# Patient Record
Sex: FEMALE | Race: WHITE | NOT HISPANIC OR LATINO | Employment: OTHER | URBAN - NONMETROPOLITAN AREA
[De-identification: names, ages, dates, MRNs, and addresses within clinical notes are randomized per-mention and may not be internally consistent; named-entity substitution may affect disease eponyms.]

---

## 2017-03-06 ENCOUNTER — OFFICE VISIT (OUTPATIENT)
Dept: CARDIOLOGY | Facility: CLINIC | Age: 77
End: 2017-03-06

## 2017-03-06 VITALS
HEIGHT: 66 IN | OXYGEN SATURATION: 96 % | HEART RATE: 82 BPM | WEIGHT: 154.8 LBS | SYSTOLIC BLOOD PRESSURE: 131 MMHG | BODY MASS INDEX: 24.88 KG/M2 | DIASTOLIC BLOOD PRESSURE: 83 MMHG

## 2017-03-06 DIAGNOSIS — R00.2 PALPITATIONS: Primary | ICD-10-CM

## 2017-03-06 DIAGNOSIS — R07.2 PRECORDIAL PAIN: ICD-10-CM

## 2017-03-06 DIAGNOSIS — I10 ESSENTIAL HYPERTENSION: ICD-10-CM

## 2017-03-06 PROCEDURE — 99213 OFFICE O/P EST LOW 20 MIN: CPT | Performed by: PHYSICIAN ASSISTANT

## 2017-03-06 RX ORDER — DULOXETIN HYDROCHLORIDE 30 MG/1
30 CAPSULE, DELAYED RELEASE ORAL DAILY
COMMUNITY
Start: 2017-02-28 | End: 2017-07-06 | Stop reason: DRUGHIGH

## 2017-03-06 NOTE — PROGRESS NOTES
"Problem list     Subjective   Nanci Pritchard is a 76 y.o. female     Chief Complaint   Patient presents with   • Follow-up     presents as a follow up   • Palpitations   • Chest Pain       HPI  The patient presents back today to review stress and echo findings.  She was scheduled for those studies because of chest pain.  Majority of the patient's problem started when she had been placed on Mobic.  Apparently after starting that medication she had numerous issues, some of which included chest pain.  She was stopped on that a lot of her difficulties subsided.  We did go ahead and schedule her for stress test and echo.  She did fine with stress test.  During echocardiogram however she started developing chest pain.  She reports that the pain was from the pressure of the ultrasound probe.  Her pain intensified and became significant over a period of a few days.  She eventually was seen in the ER and advised that her \"echo study had set off fibromyalgia\".  She was placed on Cymbalta eventually by her rheumatologist.  Symptoms improved.  She continues to have chest pain at the site of prior echo.  She continues to be fairly dyspneic.  She has significant fatigue.  She now has a very tachycardic and irregular heart rhythm which can occasionally worsen symptoms.  She has no dizziness or syncope.  She has no further complaints otherwise.    Current Outpatient Prescriptions   Medication Sig Dispense Refill   • acetaminophen (TYLENOL) 325 MG tablet Take 650 mg by mouth 3 (three) times a day. Take with tramadol     • amLODIPine (NORVASC) 5 MG tablet Take 1 tablet by mouth daily. 90 tablet 3   • aspirin 81 MG EC tablet Take 81 mg by mouth daily.     • cloNIDine (CATAPRES) 0.1 MG tablet Take 1 tablet by mouth 3 (three) times a day as needed for high blood pressure (To be taken for b/p >160/90 as needed). 90 tablet 3   • DULoxetine (CYMBALTA) 30 MG capsule Take 30 mg by mouth Daily.     • lisinopril (PRINIVIL,ZESTRIL) 20 MG " "tablet      • meloxicam (MOBIC) 15 MG tablet daily.     • metoprolol tartrate (LOPRESSOR) 50 MG tablet Take 1 tablet by mouth daily. 90 tablet 3   • omeprazole (PriLOSEC) 20 MG capsule Take 20 mg by mouth daily.     • ondansetron (ZOFRAN) 4 MG tablet as needed.     • traMADol (ULTRAM) 50 MG tablet 3 (three) times a day.       No current facility-administered medications for this visit.        Soma [carisoprodol]    Past Medical History   Diagnosis Date   • Fibromyalgia    • Histoplasmosis    • Hypertension    • Osteoarthritis        Social History     Social History   • Marital status:      Spouse name: N/A   • Number of children: N/A   • Years of education: N/A     Occupational History   • Not on file.     Social History Main Topics   • Smoking status: Never Smoker   • Smokeless tobacco: Never Used   • Alcohol use No   • Drug use: No   • Sexual activity: Not on file     Other Topics Concern   • Not on file     Social History Narrative       Family History   Problem Relation Age of Onset   • Heart disease Mother    • Heart disease Father        Review of Systems   Constitutional: Positive for fatigue.   HENT: Negative.    Eyes: Positive for visual disturbance.   Respiratory: Positive for chest tightness and shortness of breath.    Cardiovascular: Positive for chest pain (chest pressure; pain going into left shoulder and up into neck) and palpitations. Negative for leg swelling.   Gastrointestinal: Negative.    Endocrine: Negative.    Genitourinary: Negative.    Musculoskeletal: Positive for arthralgias and myalgias (fibromyalgia).   Skin: Negative.    Allergic/Immunologic: Negative.    Neurological: Negative.    Hematological: Negative.    Psychiatric/Behavioral: Negative.        Objective   Visit Vitals   • /83 (BP Location: Left arm, Patient Position: Sitting)   • Pulse 82   • Ht 66\" (167.6 cm)   • Wt 154 lb 12.8 oz (70.2 kg)   • SpO2 96%   • BMI 24.99 kg/m2     Lab Results (most recent)     None    "     Physical Exam   Constitutional: She is oriented to person, place, and time. She appears well-developed and well-nourished. No distress.   HENT:   Head: Normocephalic and atraumatic.   Eyes: Conjunctivae and EOM are normal. Pupils are equal, round, and reactive to light.   Neck: Normal range of motion. Neck supple. No JVD present. No tracheal deviation present.   Cardiovascular: Normal rate, regular rhythm, normal heart sounds and intact distal pulses.    Pulmonary/Chest: Effort normal and breath sounds normal.   Abdominal: Soft. Bowel sounds are normal. She exhibits no distension and no mass. There is no tenderness. There is no rebound and no guarding.   Musculoskeletal: Normal range of motion. She exhibits no edema, tenderness or deformity.   Neurological: She is alert and oriented to person, place, and time.   Skin: Skin is warm and dry. No rash noted. No erythema. No pallor.   Psychiatric: She has a normal mood and affect. Her behavior is normal. Judgment and thought content normal.   Nursing note and vitals reviewed.        Procedure   Procedures       Assessment/Plan      Diagnosis Plan   1. Palpitations  Cardiac Event Monitor   2. Precordial pain  Cardiac Event Monitor   3. Essential hypertension  Cardiac Event Monitor     The patient's recent stress test and echo were unremarkable.  She feels that a lot of her symptoms are related to fibromyalgia.  Still with symptoms of palpitations, which occasionally exacerbates symptoms otherwise as above, I will schedule for an event monitor to further evaluate for dysrhythmic substrates.  If her event monitor is unremarkable, given her recent low risk stress test and echocardiogram and atypical nature of symptoms, I feel nothing further is indicated unless clinical course should change.  I have asked her to go back and see her rheumatologist to further address fibromyalgia as the patient is very concerned that this is causing the majority of her issues.

## 2017-03-20 ENCOUNTER — OUTSIDE FACILITY SERVICE (OUTPATIENT)
Dept: CARDIOLOGY | Facility: CLINIC | Age: 77
End: 2017-03-20

## 2017-03-20 PROCEDURE — 93228 REMOTE 30 DAY ECG REV/REPORT: CPT | Performed by: INTERNAL MEDICINE

## 2017-03-22 ENCOUNTER — TELEPHONE (OUTPATIENT)
Dept: CARDIOLOGY | Facility: CLINIC | Age: 77
End: 2017-03-22

## 2017-03-22 NOTE — TELEPHONE ENCOUNTER
----- Message from Gutierrez Ya sent at 3/22/2017  1:58 PM EDT -----  Contact: LEXIS   SPOUSE  HAD A PACEMAKER PUT IN LAST WEEK AND WANTS TO KNOW WHAT PAIN MEDICINE SHE CAN TAKE    Patient advised to call Dr. Gutierrez's office re: pain meds.

## 2017-03-29 ENCOUNTER — OFFICE VISIT (OUTPATIENT)
Dept: CARDIOLOGY | Facility: CLINIC | Age: 77
End: 2017-03-29

## 2017-03-29 VITALS
DIASTOLIC BLOOD PRESSURE: 89 MMHG | OXYGEN SATURATION: 98 % | BODY MASS INDEX: 25.07 KG/M2 | HEIGHT: 66 IN | HEART RATE: 80 BPM | WEIGHT: 156 LBS | SYSTOLIC BLOOD PRESSURE: 152 MMHG

## 2017-03-29 DIAGNOSIS — R74.8 ELEVATED LIVER ENZYMES: ICD-10-CM

## 2017-03-29 DIAGNOSIS — R06.02 SHORTNESS OF BREATH: ICD-10-CM

## 2017-03-29 DIAGNOSIS — R07.2 PRECORDIAL PAIN: ICD-10-CM

## 2017-03-29 DIAGNOSIS — Z95.0 PACEMAKER: ICD-10-CM

## 2017-03-29 DIAGNOSIS — E78.2 MIXED HYPERLIPIDEMIA: ICD-10-CM

## 2017-03-29 DIAGNOSIS — R00.2 PALPITATIONS: Primary | ICD-10-CM

## 2017-03-29 PROBLEM — R07.9 CHEST PAIN: Status: ACTIVE | Noted: 2017-03-29

## 2017-03-29 PROBLEM — I10 ESSENTIAL HYPERTENSION: Status: ACTIVE | Noted: 2017-03-29

## 2017-03-29 PROCEDURE — 99214 OFFICE O/P EST MOD 30 MIN: CPT | Performed by: PHYSICIAN ASSISTANT

## 2017-03-29 RX ORDER — OMEPRAZOLE 40 MG/1
40 CAPSULE, DELAYED RELEASE ORAL DAILY
COMMUNITY
Start: 2017-03-11

## 2017-03-29 NOTE — PROGRESS NOTES
Problem list     Subjective   Nanci Pritchard is a 76 y.o. female     Chief Complaint   Patient presents with   • Follow-up     presents as a follow up from Hospital    • Medication Problem     patient has questions about Rapetha     Problem List:  1.  Sick sinus syndrome.  1.1.  Greater than 3 second pause noted per event monitor.  1.2.  The patient was subsequently admitted to [facility] with placement of dual-chamber pacemaker per Dr. Gutierrez.  1.3.  Apparent lead dislodgment with subsequent fixation of the leads the following day post pacemaker placement.  2.  Preserved systolic function  3.  Low risk stress test, 09/2016.  4.  Hypertension  5.  Fibromyalgia.    HPI    The patient presents back for follow-up post hospitalization.  She was hospitalized given symptomatic 3 second pauses or greater by event monitor.  She did have an evaluation by Dr. Gutierrez who proceeded on with pacemaker placement.  There were complications with lead placement with apparent dislodgment.  She was taken back for fixation the following day.  A follow-up interrogation prior to discharge indicated stable pacer parameters.  The patient did very well initially post discharge.  She has since started noticing increased palpitations, which was a significant symptom for her prior to pacemaker placement.  She also had a severe episode of chest pain just a couple of days after discharge.  She reports that her chest pain was severe.  By description, she had a more pleuritic type presentation of her chest pain.  She reports that this lasted for several minutes.  She reports that her chest pain was very sharp and located in the center and left parasternal segments.  She had severe dyspnea at that time.  She related no fever, chills, right ears, or hemoptysis at that time.  She remained very weak for a couple of days after that.  She had only mild chest pain in the couple of days following that.  She continued to have  mild palpitations.  Over the past day or so however the patient has felt markedly improved.  She has had no further palpitations or chest pain.  Her dyspnea even seems improved.  She wanted to have evaluation today to ensure ongoing stability, particularly in the setting of her lead complication post pacer placement.  Current Outpatient Prescriptions   Medication Sig Dispense Refill   • amLODIPine (NORVASC) 5 MG tablet Take 1 tablet by mouth daily. 90 tablet 3   • aspirin 81 MG EC tablet Take 81 mg by mouth daily.     • DULoxetine (CYMBALTA) 30 MG capsule Take 30 mg by mouth Daily.     • lisinopril (PRINIVIL,ZESTRIL) 20 MG tablet      • metoprolol tartrate (LOPRESSOR) 50 MG tablet Take 1 tablet by mouth daily. 90 tablet 3   • omeprazole (priLOSEC) 40 MG capsule Take 40 mg by mouth Daily.     • Evolocumab with Infusor 420 MG/3.5ML solution cartridge Inject 420 cartridges under the skin Every 30 (Thirty) Days. 1 cartridge 11     No current facility-administered medications for this visit.        Soma [carisoprodol]    Past Medical History:   Diagnosis Date   • Fibromyalgia    • Histoplasmosis    • Hypertension    • Osteoarthritis        Social History     Social History   • Marital status:      Spouse name: N/A   • Number of children: N/A   • Years of education: N/A     Occupational History   • Not on file.     Social History Main Topics   • Smoking status: Never Smoker   • Smokeless tobacco: Never Used   • Alcohol use No   • Drug use: No   • Sexual activity: Not on file     Other Topics Concern   • Not on file     Social History Narrative       Family History   Problem Relation Age of Onset   • Heart disease Mother    • Heart disease Father        Review of Systems   Constitutional: Positive for fatigue.   HENT: Negative.    Eyes: Positive for visual disturbance (glasses).   Respiratory: Positive for shortness of breath.    Cardiovascular: Positive for chest pain (had a bad spell on saturday. pain thru to back  "and goes up to shoulder blades).   Gastrointestinal: Positive for nausea.   Endocrine: Negative.    Genitourinary: Negative.    Musculoskeletal: Positive for back pain.   Skin: Negative.    Allergic/Immunologic: Negative.    Neurological: Negative.    Hematological: Negative.    Psychiatric/Behavioral: The patient is nervous/anxious.        Objective   /89 (BP Location: Left arm, Patient Position: Sitting)  Pulse 80  Ht 66\" (167.6 cm)  Wt 156 lb (70.8 kg)  SpO2 98%  BMI 25.18 kg/m2  Lab Results (most recent)     None        Physical Exam   Constitutional: She is oriented to person, place, and time. She appears well-developed and well-nourished. No distress.   HENT:   Head: Normocephalic and atraumatic.   Eyes: Conjunctivae and EOM are normal. Pupils are equal, round, and reactive to light.   Neck: Normal range of motion. Neck supple. No JVD present. No tracheal deviation present.   Cardiovascular: Normal rate, regular rhythm, normal heart sounds and intact distal pulses.    Pulmonary/Chest: Effort normal and breath sounds normal.   Abdominal: Soft. Bowel sounds are normal. She exhibits no distension and no mass. There is no tenderness. There is no rebound and no guarding.   Musculoskeletal: Normal range of motion. She exhibits no edema, tenderness or deformity.   Neurological: She is alert and oriented to person, place, and time.   Skin: Skin is warm and dry. No rash noted. No erythema. No pallor.   Psychiatric: She has a normal mood and affect. Her behavior is normal. Judgment and thought content normal.   Nursing note and vitals reviewed.        Procedure   Procedures       Assessment/Plan      Diagnosis Plan   1. Palpitations  XR Chest PA & Lateral    CBC & Differential    Comprehensive Metabolic Panel    XR Chest PA & Lateral    CBC & Differential    Comprehensive Metabolic Panel   2. Precordial pain  XR Chest PA & Lateral    CBC & Differential    Comprehensive Metabolic Panel    XR Chest PA & Lateral " "   CBC & Differential    Comprehensive Metabolic Panel   3. Shortness of breath  XR Chest PA & Lateral    CBC & Differential    Comprehensive Metabolic Panel    D-dimer, Quantitative    XR Chest PA & Lateral    CBC & Differential    Comprehensive Metabolic Panel    D-dimer, Quantitative   4. Elevated liver enzymes  Evolocumab with Infusor 420 MG/3.5ML solution cartridge   5. Mixed hyperlipidemia  XR Chest PA & Lateral    CBC & Differential    Comprehensive Metabolic Panel    Evolocumab with Infusor 420 MG/3.5ML solution cartridge    XR Chest PA & Lateral    CBC & Differential    Comprehensive Metabolic Panel   6. Pacemaker         I would like to schedule for a chest x-ray and labs, all as above, given her chest pain and shortness of air after pacemaker placement as above.  I would prefer to have an echocardiogram performed to ensure no effusion post pacer implant, particularly given her lead issues after placement, all as outlined above.  The patient is very adamant that she would not want an echocardiogram.  She reports that when having her last echocardiogram, the pressure from the echo probe \"set off her fibromyalgia throughout her chest wall\".  This was so severe that she actually had to have morphine to offset the pain.  She just basically refuses to have the echo performed.  Also, I would like to schedule for an interrogation of her pacemaker.  This will serve not only to ensure lead stability, but will allow to evaluate for potential dysrhythmias as the patient had fairly significant palpitations as of recent.    The patient also is interested in pursuing injectable cholesterol medications.  She has failed virtually all statins.  She has severe dyslipidemia, which has been a chronic recurrent issue for her.  She had labs recently which reflects the same.  I will attempt to restart this through her insurance company.  She reports that she was told several times throughout her recent hospitalization that she " would need consideration for the same.  Regardless, further pending all the above.

## 2017-04-13 ENCOUNTER — OFFICE VISIT (OUTPATIENT)
Dept: CARDIOLOGY | Facility: CLINIC | Age: 77
End: 2017-04-13

## 2017-04-13 VITALS
HEIGHT: 66 IN | HEART RATE: 71 BPM | DIASTOLIC BLOOD PRESSURE: 78 MMHG | SYSTOLIC BLOOD PRESSURE: 132 MMHG | OXYGEN SATURATION: 95 % | BODY MASS INDEX: 24.91 KG/M2 | WEIGHT: 155 LBS

## 2017-04-13 DIAGNOSIS — I10 ESSENTIAL HYPERTENSION: ICD-10-CM

## 2017-04-13 DIAGNOSIS — R07.9 CHEST PAIN, UNSPECIFIED TYPE: Primary | ICD-10-CM

## 2017-04-13 DIAGNOSIS — R00.2 PALPITATIONS: ICD-10-CM

## 2017-04-13 DIAGNOSIS — R00.2 HEART PALPITATIONS: ICD-10-CM

## 2017-04-13 PROCEDURE — 93000 ELECTROCARDIOGRAM COMPLETE: CPT | Performed by: PHYSICIAN ASSISTANT

## 2017-04-13 PROCEDURE — 99213 OFFICE O/P EST LOW 20 MIN: CPT | Performed by: PHYSICIAN ASSISTANT

## 2017-04-13 NOTE — PROGRESS NOTES
Problem list     Subjective   Nanci Pritchard is a 77 y.o. female     Chief Complaint   Patient presents with   • Follow-up     patient appears in office today with c/o chest pressure (x3 this week)   Problem List:  1. Sick sinus syndrome.  1.1. Greater than 3 second pause noted per event monitor.  1.2. The patient was subsequently admitted to [facility] with placement of dual-chamber pacemaker per Dr. Gutierrez.  1.3. Apparent lead dislodgment with subsequent fixation of the leads the following day post pacemaker placement.  2. Preserved systolic function  3. Low risk stress test, 09/2016.  4. Hypertension  5. Fibromyalgia.       HPI  The patient presents back today at her request.  She continues to have severe chest pain.  She reports that she has woke up the past 2 nights just after going to bed.  She had intense precordial chest discomfort which had referral through to the mid back.  There is also referral to the left upper extremity.  The pain was so severe that she reports that she would actually cry out.  Her chest pain lasted varied amounts of time but mostly no more than 5-10 minutes.  She has had this basically since pacer placement.  We did a chest x-ray which was unremarkable.  We had wanted to repeat an echocardiogram to ensure no effusion, however the patient reports that the last echocardiogram induced fibromyalgia throughout her chest wall which was so severe that she needed morphine from the ER.  She did not want to repeat that.  She did have a stress test just prior to pacer placement which indicated no evidence of ischemia.  She was out further evaluation given her chest pain.  She also does note that she is tachycardic when she walks around now.  She reports that her resting heart rate will be between 60-70 beats per minutes, but increased to 112-115 bpm with minor activity.  She had the same complaint previously which prompted us to do a pacer interrogation.  She had no  dysrhythmic symptoms by device check.    Current Outpatient Prescriptions   Medication Sig Dispense Refill   • amLODIPine (NORVASC) 5 MG tablet Take 1 tablet by mouth daily. 90 tablet 3   • aspirin 81 MG EC tablet Take 81 mg by mouth daily.     • DULoxetine (CYMBALTA) 30 MG capsule Take 30 mg by mouth Daily.     • lisinopril (PRINIVIL,ZESTRIL) 20 MG tablet      • metoprolol tartrate (LOPRESSOR) 50 MG tablet Take 1 tablet by mouth daily. 90 tablet 3   • omeprazole (priLOSEC) 40 MG capsule Take 40 mg by mouth Daily.       No current facility-administered medications for this visit.        Soma [carisoprodol]    Past Medical History:   Diagnosis Date   • Arrhythmia    • Fibromyalgia    • Histoplasmosis    • Hyperlipidemia    • Hypertension    • Osteoarthritis        Social History     Social History   • Marital status:      Spouse name: N/A   • Number of children: N/A   • Years of education: N/A     Occupational History   • Not on file.     Social History Main Topics   • Smoking status: Never Smoker   • Smokeless tobacco: Never Used   • Alcohol use No   • Drug use: No   • Sexual activity: Defer     Other Topics Concern   • Not on file     Social History Narrative       Family History   Problem Relation Age of Onset   • Heart disease Mother    • Heart disease Father        Review of Systems   Constitutional: Positive for fatigue.   HENT: Positive for hearing loss.    Eyes: Negative for visual disturbance.   Respiratory: Positive for chest tightness (when having chest pain ).    Cardiovascular: Positive for chest pain (3x this week; waking up in middle of night ), palpitations (occasional ) and leg swelling.   Gastrointestinal: Negative.    Endocrine: Negative.    Genitourinary: Negative.    Musculoskeletal: Positive for arthralgias.   Skin: Negative.    Allergic/Immunologic: Negative.  Negative for environmental allergies and food allergies.   Neurological: Negative.    Hematological: Bruises/bleeds easily  "(bleeds easily ).   Psychiatric/Behavioral: The patient is nervous/anxious (occasional ).        Objective   /78 (BP Location: Left arm, Patient Position: Sitting)  Pulse 71  Ht 66\" (167.6 cm)  Wt 155 lb (70.3 kg)  SpO2 95%  BMI 25.02 kg/m2  Lab Results (most recent)     None        Physical Exam   Constitutional: She is oriented to person, place, and time. She appears well-developed and well-nourished. No distress.   HENT:   Head: Normocephalic and atraumatic.   Eyes: Conjunctivae and EOM are normal. Pupils are equal, round, and reactive to light.   Neck: Normal range of motion. Neck supple. No JVD present. No tracheal deviation present.   Cardiovascular: Normal rate, regular rhythm, normal heart sounds and intact distal pulses.    Pulmonary/Chest: Effort normal and breath sounds normal.   Abdominal: Soft. Bowel sounds are normal. She exhibits no distension and no mass. There is no tenderness. There is no rebound and no guarding.   Musculoskeletal: Normal range of motion. She exhibits no edema, tenderness or deformity.   Neurological: She is alert and oriented to person, place, and time.   Skin: Skin is warm and dry. No rash noted. No erythema. No pallor.   Psychiatric: She has a normal mood and affect. Her behavior is normal. Judgment and thought content normal.   Nursing note and vitals reviewed.        Procedure     ECG 12 Lead  Date/Time: 4/13/2017 2:06 PM  Performed by: SHELLI HENDRICKSON  Authorized by: SHELLI HENDRICKSON   Comments: CP  SOB  PALPITATIONS      Sinus rhythm without acute changes noted by EKG today.               Assessment/Plan      Diagnosis Plan   1. Chest pain, unspecified type  ECG 12 Lead    CT Chest With Contrast   2. Essential hypertension  ECG 12 Lead    CT Chest With Contrast   3. Heart palpitations  ECG 12 Lead    CT Chest With Contrast     I will schedule for CT the chest.  The patient continues to have severe chest pain.  Her prior ischemia assessment was unremarkable.  " Chest x-ray after last appointment was unremarkable as well.  I have attempted to schedule for an echocardiogram to ensure no effusion post pacer implant (doubtful), but she cannot do the echo cavagram all as outlined above.  She has rather significant dyspnea as well.  She has ongoing tachycardia but had that prior to last pacer interrogation.  She had no dysrhythmic symptoms.  I do not feel further evaluation is warranted for that.  If CT of the chest is unremarkable, I feel nothing further is indicated at this time.  We'll see her back pending.  For further issues, she will call to us.  Also note, we are attempting to get injectable cholesterol medications covered for her as she has failed virtually all statins therapies.

## 2017-07-06 ENCOUNTER — OFFICE VISIT (OUTPATIENT)
Dept: CARDIOLOGY | Facility: CLINIC | Age: 77
End: 2017-07-06

## 2017-07-06 VITALS
BODY MASS INDEX: 25.75 KG/M2 | OXYGEN SATURATION: 96 % | WEIGHT: 160.2 LBS | DIASTOLIC BLOOD PRESSURE: 74 MMHG | HEART RATE: 78 BPM | SYSTOLIC BLOOD PRESSURE: 136 MMHG | HEIGHT: 66 IN

## 2017-07-06 DIAGNOSIS — Z95.0 PACEMAKER: ICD-10-CM

## 2017-07-06 DIAGNOSIS — R06.02 SHORTNESS OF BREATH: Primary | ICD-10-CM

## 2017-07-06 DIAGNOSIS — R53.83 OTHER FATIGUE: ICD-10-CM

## 2017-07-06 PROCEDURE — 99213 OFFICE O/P EST LOW 20 MIN: CPT | Performed by: PHYSICIAN ASSISTANT

## 2017-07-06 RX ORDER — DULOXETIN HYDROCHLORIDE 60 MG/1
60 CAPSULE, DELAYED RELEASE ORAL DAILY
COMMUNITY
Start: 2017-06-23

## 2017-07-06 NOTE — PROGRESS NOTES
"Problem list     Subjective   Nanci Pritchard is a 77 y.o. female     Chief Complaint   Patient presents with   • Follow-up     CT   Problem List:  1. Sick sinus syndrome.  1.1. Greater than 3 second pause noted per event monitor.  1.2. The patient was subsequently admitted to [facility] with placement of dual-chamber pacemaker per Dr. Gutierrez.  1.3. Apparent lead dislodgment with subsequent fixation of the leads the following day post pacemaker placement.  2. Preserved systolic function  3. Low risk stress test, 09/2016.  4. Hypertension  5. Fibromyalgia.       HPI  The patient presents back today for follow-up.  We'd seen her because of ongoing chest pain and shortness of air last visit.  We did schedule for CT of the chest.  That indicated largely stable findings but did note a nodule was felt chronic.  We have sent that to his primary care provider following of the same.  The patient reports that her chest pain is persistent.  Her dyspnea is moderate.  She has fatigue which is significant at times.  She feels that all the above is related to \"fibro\".  Her  believes that as well.  Cardiac testing at this point is been largely unremarkable otherwise.  Her CT did suggest calcification of coronary arteries.  Her stress test from September however indicated no evidence of ischemia.  The patient feels she is doing well enough at this time.  She has improved post pacemaker implant.  The patient has no further complaints otherwise.    Current Outpatient Prescriptions   Medication Sig Dispense Refill   • amLODIPine (NORVASC) 5 MG tablet Take 1 tablet by mouth daily. 90 tablet 3   • aspirin 81 MG EC tablet Take 81 mg by mouth daily.     • DULoxetine (CYMBALTA) 60 MG capsule Daily.     • Evolocumab with Infusor (REPATHA PUSHTRONEX SYSTEM) 420 MG/3.5ML solution cartridge Inject  under the skin Every 30 (Thirty) Days.     • lisinopril (PRINIVIL,ZESTRIL) 20 MG tablet Take 20 mg by mouth Daily.   " "  • metFORMIN (GLUCOPHAGE) 500 MG tablet 1,500 mg Daily.     • metoprolol tartrate (LOPRESSOR) 50 MG tablet Take 1 tablet by mouth daily. 90 tablet 3   • omeprazole (priLOSEC) 40 MG capsule Take 40 mg by mouth Daily.       No current facility-administered medications for this visit.        Soma [carisoprodol]    Past Medical History:   Diagnosis Date   • Arrhythmia    • Fibromyalgia    • Histoplasmosis    • Hyperlipidemia    • Hypertension    • Osteoarthritis        Social History     Social History   • Marital status:      Spouse name: N/A   • Number of children: N/A   • Years of education: N/A     Occupational History   • Not on file.     Social History Main Topics   • Smoking status: Never Smoker   • Smokeless tobacco: Never Used   • Alcohol use No   • Drug use: No   • Sexual activity: Defer     Other Topics Concern   • Not on file     Social History Narrative       Family History   Problem Relation Age of Onset   • Heart disease Mother    • Heart disease Father        Review of Systems   Constitutional: Positive for fatigue.   HENT: Positive for nosebleeds, sore throat and trouble swallowing.    Eyes: Positive for visual disturbance (wears glasses).   Respiratory: Negative.    Cardiovascular: Positive for palpitations. Negative for chest pain and leg swelling.   Gastrointestinal: Negative.    Endocrine: Negative.    Genitourinary: Negative.    Musculoskeletal: Positive for arthralgias and myalgias.   Skin: Negative.    Allergic/Immunologic: Negative.    Neurological: Negative.    Hematological: Negative.    Psychiatric/Behavioral: Negative.        Objective   /74 (BP Location: Left arm, Patient Position: Sitting)  Pulse 78  Ht 66\" (167.6 cm)  Wt 160 lb 3.2 oz (72.7 kg)  SpO2 96%  BMI 25.86 kg/m2  Lab Results (most recent)     None        Physical Exam   Constitutional: She is oriented to person, place, and time. She appears well-developed and well-nourished. No distress.   HENT:   Head: " Normocephalic and atraumatic.   Eyes: Conjunctivae and EOM are normal. Pupils are equal, round, and reactive to light.   Neck: Normal range of motion. Neck supple. No JVD present. No tracheal deviation present.   Cardiovascular: Normal rate, regular rhythm, normal heart sounds and intact distal pulses.    Pulmonary/Chest: Effort normal and breath sounds normal.   Abdominal: Soft. Bowel sounds are normal. She exhibits no distension and no mass. There is no tenderness. There is no rebound and no guarding.   Musculoskeletal: Normal range of motion. She exhibits no edema, tenderness or deformity.   Neurological: She is alert and oriented to person, place, and time.   Skin: Skin is warm and dry. No rash noted. No erythema. No pallor.   Psychiatric: She has a normal mood and affect. Her behavior is normal. Judgment and thought content normal.   Nursing note and vitals reviewed.        Procedure   Procedures       Assessment/Plan      Diagnosis Plan   1. Shortness of breath     2. Other fatigue     3. Pacemaker       The patient seems stable.  Recent workup is been unremarkable.  She had calcification of the coronaries by CT, however has no evidence of ischemia by stress test findings.  Symptoms otherwise seems stable for her.  I feel nothing further is indicated.  We'll see her back in 6 month, sooner if indicated by course.  We will continue routine pacemaker interrogations every 6 months as well.  For issues, she will call to us immediately.

## 2017-09-14 ENCOUNTER — TELEPHONE (OUTPATIENT)
Dept: CARDIOLOGY | Facility: CLINIC | Age: 77
End: 2017-09-14

## 2017-09-14 NOTE — TELEPHONE ENCOUNTER
Cardiac clearance req was received in office from [facility] .     Records were reviewed by Edilsno Souza PA-C and cardiac clearance was faxed to [facility] @ 3485 PM. -KAT; MACARIO

## 2017-11-13 ENCOUNTER — OFFICE VISIT (OUTPATIENT)
Dept: CARDIOLOGY | Facility: CLINIC | Age: 77
End: 2017-11-13

## 2017-11-13 ENCOUNTER — TELEPHONE (OUTPATIENT)
Dept: CARDIOLOGY | Facility: CLINIC | Age: 77
End: 2017-11-13

## 2017-11-13 DIAGNOSIS — I49.5 SICK SINUS SYNDROME (HCC): Primary | ICD-10-CM

## 2017-11-13 PROCEDURE — 93288 INTERROG EVL PM/LDLS PM IP: CPT | Performed by: INTERNAL MEDICINE

## 2017-11-13 NOTE — TELEPHONE ENCOUNTER
PATIENT WAS SCHEDULED TO HAVE HER DEVICE CHECKED ON Friday, BUT REQUESTED IT BE DONE SOONER. STATED DR. REYES'S OFFICE REQUESTING DEVICE TO BE CHECKED PRIOR TO KNEE REPLACEMMENT SURGERY ON Monday. DEVICE CHECKED TODAY PER [facility], RESULTS REVIEWED BY SHELLI HENDRICKOSN, NILESH,AND RESULTS FAXED TO ATTNCrow LERMA AT [facility] PRE-ADMISSION TESTING FAX # [phone]. RADHA,BUZZN

## 2017-11-14 ENCOUNTER — TELEPHONE (OUTPATIENT)
Dept: CARDIOLOGY | Facility: CLINIC | Age: 77
End: 2017-11-14

## 2017-11-14 NOTE — TELEPHONE ENCOUNTER
patient presents today to have [employer] forms filled out to help with co-pay assistance with her Repatha injection, form was filled out, faxed to the [facility] and given back to patient with a copy put into scan pile to be scanned into patients chart. patient is to call our office with questions or concerns.

## 2017-11-16 ENCOUNTER — TELEPHONE (OUTPATIENT)
Dept: CARDIOLOGY | Facility: CLINIC | Age: 77
End: 2017-11-16

## 2017-11-16 NOTE — TELEPHONE ENCOUNTER
Cardiac clearance req was received in office from Dr. Pedro TEJEDA. This was reviewed by Edilson Souza PA-C and sent back on 11/13/2017. -;MACARIO

## 2018-02-28 ENCOUNTER — DOCUMENTATION (OUTPATIENT)
Dept: CARDIOLOGY | Facility: CLINIC | Age: 78
End: 2018-02-28

## 2018-02-28 NOTE — PROGRESS NOTES
Cardiac clearance req was received from  office, this was reviewed by Edilson Souza PA-C and faxed back . -;[facility]

## 2018-04-19 ENCOUNTER — OFFICE VISIT (OUTPATIENT)
Dept: CARDIOLOGY | Facility: CLINIC | Age: 78
End: 2018-04-19

## 2018-04-19 VITALS
SYSTOLIC BLOOD PRESSURE: 140 MMHG | OXYGEN SATURATION: 96 % | HEART RATE: 78 BPM | DIASTOLIC BLOOD PRESSURE: 79 MMHG | HEIGHT: 66 IN | BODY MASS INDEX: 24.59 KG/M2 | WEIGHT: 153 LBS

## 2018-04-19 DIAGNOSIS — R06.02 SOB (SHORTNESS OF BREATH): Primary | ICD-10-CM

## 2018-04-19 DIAGNOSIS — Z95.0 PACEMAKER: ICD-10-CM

## 2018-04-19 DIAGNOSIS — I10 ESSENTIAL HYPERTENSION: ICD-10-CM

## 2018-04-19 PROCEDURE — 93000 ELECTROCARDIOGRAM COMPLETE: CPT | Performed by: PHYSICIAN ASSISTANT

## 2018-04-19 PROCEDURE — 99213 OFFICE O/P EST LOW 20 MIN: CPT | Performed by: PHYSICIAN ASSISTANT

## 2018-04-19 RX ORDER — GLIMEPIRIDE 4 MG/1
4 TABLET ORAL DAILY
COMMUNITY
Start: 2018-03-05 | End: 2022-02-08

## 2018-04-19 NOTE — PATIENT INSTRUCTIONS

## 2018-04-19 NOTE — PROGRESS NOTES
Problem list     Subjective   Nanci Pritchard is a 78 y.o. female     Chief Complaint   Patient presents with   • Follow-up     PATIENT APPEARS IN OFFICE TODAY FOR FOLLOW UP    • Shortness of Breath   Problem List:  1. Sick sinus syndrome.  1.1. Greater than 3 second pause noted per event monitor.  1.2. The patient was subsequently admitted to [facility] with placement of dual-chamber pacemaker per Dr. Gutierrez.  1.3. Apparent lead dislodgment with subsequent fixation of the leads the following day post pacemaker placement.  2. Preserved systolic function  3. Low risk stress test, 09/2016.  4. Hypertension  5. Fibromyalgia.       HPI  The patient presents back in for routine evaluation follow-up.  Interrogations to the clinic if indicated stable pacemaker function.  The patient tells me that she has had mild chest discomfort since her last evaluation, but she feels that this is related more to fibromyalgia.  She still has palpitations at times.  She reports brief tachycardic episodes.  She has no sustained dysrhythmic activity.  She has stable dyspnea.  She denies dizziness or syncope.  Blood pressures are largely controlled when checked at home.  She is tolerating current medications without complications.  Labs are followed through her primary care provider and felt largely normal by patient.    Current Outpatient Prescriptions   Medication Sig Dispense Refill   • amLODIPine (NORVASC) 5 MG tablet Take 1 tablet by mouth daily. 90 tablet 3   • aspirin 81 MG EC tablet Take 81 mg by mouth daily.     • DULoxetine (CYMBALTA) 60 MG capsule Take 60 mg by mouth Daily.     • Evolocumab with Infusor (REPATHA PUSHTRONEX SYSTEM) 420 MG/3.5ML solution cartridge Inject  under the skin Every 30 (Thirty) Days.     • glimepiride (AMARYL) 2 MG tablet Take 2 mg by mouth Daily.     • lisinopril (PRINIVIL,ZESTRIL) 20 MG tablet Take 20 mg by mouth Daily.     • metFORMIN (GLUCOPHAGE) 500 MG tablet Take 1,000 mg by  mouth 2 (Two) Times a Day With Meals.     • metoprolol tartrate (LOPRESSOR) 50 MG tablet Take 1 tablet by mouth daily. 90 tablet 3   • omeprazole (priLOSEC) 40 MG capsule Take 40 mg by mouth Daily.       No current facility-administered medications for this visit.        Soma [carisoprodol]    Past Medical History:   Diagnosis Date   • Arrhythmia    • Fibromyalgia    • Histoplasmosis    • Hyperlipidemia    • Hypertension    • Osteoarthritis        Social History     Social History   • Marital status:      Spouse name: N/A   • Number of children: N/A   • Years of education: N/A     Occupational History   • Not on file.     Social History Main Topics   • Smoking status: Never Smoker   • Smokeless tobacco: Never Used   • Alcohol use No   • Drug use: No   • Sexual activity: Defer     Other Topics Concern   • Not on file     Social History Narrative   • No narrative on file       Family History   Problem Relation Age of Onset   • Heart disease Mother    • Heart disease Father        Review of Systems   Constitutional: Negative.  Negative for fatigue.   HENT: Negative.  Negative for congestion, rhinorrhea, sneezing and sore throat.    Eyes: Positive for visual disturbance (WEARS GLASSES DAILY).   Respiratory: Positive for shortness of breath (WITH EXERTION ). Negative for apnea, cough, chest tightness and wheezing.    Cardiovascular: Positive for palpitations (OCCASIONAL PALPITATIONS ). Negative for chest pain (DENIES CP) and leg swelling.   Gastrointestinal: Negative.  Negative for abdominal distention, abdominal pain, nausea and vomiting.   Endocrine: Negative.  Negative for cold intolerance, heat intolerance, polyphagia and polyuria.   Genitourinary: Negative.  Negative for difficulty urinating, frequency and urgency.   Musculoskeletal: Positive for arthralgias (JOINTS). Negative for back pain, myalgias, neck pain and neck stiffness.   Skin: Negative.  Negative for rash and wound.   Allergic/Immunologic:  "Negative.  Negative for environmental allergies and food allergies.   Neurological: Negative.  Negative for dizziness, weakness, light-headedness and headaches.   Hematological: Negative.  Does not bruise/bleed easily.   Psychiatric/Behavioral: Negative for agitation, confusion and sleep disturbance (DENIES WAKING UP SMOTHERING/SOA). The patient is not nervous/anxious.        Objective   Vitals:    04/19/18 1309   BP: 140/79   BP Location: Left arm   Patient Position: Sitting   Pulse: 78   SpO2: 96%   Weight: 69.4 kg (153 lb)   Height: 167.6 cm (66\")      /79 (BP Location: Left arm, Patient Position: Sitting)   Pulse 78   Ht 167.6 cm (66\")   Wt 69.4 kg (153 lb)   SpO2 96%   BMI 24.69 kg/m²    Lab Results (most recent)     None        Physical Exam   Constitutional: She is oriented to person, place, and time. She appears well-developed and well-nourished. No distress.   HENT:   Head: Normocephalic and atraumatic.   Eyes: Conjunctivae and EOM are normal. Pupils are equal, round, and reactive to light.   Neck: Normal range of motion. Neck supple. No JVD present. No tracheal deviation present.   Cardiovascular: Normal rate, regular rhythm, normal heart sounds and intact distal pulses.    Pulmonary/Chest: Effort normal and breath sounds normal.   Abdominal: Soft. Bowel sounds are normal. She exhibits no distension and no mass. There is no tenderness. There is no rebound and no guarding.   Musculoskeletal: Normal range of motion. She exhibits no edema, tenderness or deformity.   Neurological: She is alert and oriented to person, place, and time.   Skin: Skin is warm and dry. No rash noted. No erythema. No pallor.   Psychiatric: She has a normal mood and affect. Her behavior is normal. Judgment and thought content normal.   Nursing note and vitals reviewed.        Procedure     ECG 12 Lead  Date/Time: 4/19/2018 1:12 PM  Performed by: SHELLI HENDRICKSON  Authorized by: SHELLI HENDRICKSON   Comments: SOB    Sinus " rhythm at 71, minor nonspecific ST and T-wave changes, no acute changes noted.               Assessment/Plan      Diagnosis Plan   1. SOB (shortness of breath)  ECG 12 Lead   2. Pacemaker     3. Essential hypertension         The patient is stable at this time.  We will continue routine pacer interrogations through the clinic.  I will make no changes in medications.  For recurrent symptoms as above, the patient will call.  She was no cardiac workup at this time as she feels she is doing too well.         Patient's Body mass index is 24.69 kg/m². BMI is above normal parameters. Follow-up plan includes:  educational material and referral to primary care.             Electronically signed by:

## 2018-05-21 ENCOUNTER — TELEPHONE (OUTPATIENT)
Dept: CARDIOLOGY | Facility: CLINIC | Age: 78
End: 2018-05-21

## 2018-05-21 NOTE — TELEPHONE ENCOUNTER
CALLED  ON INR RESULTS AND WIFE WENT ON TO SAY SHE HAD A LOT OF COMPANY OVER ON Saturday AND DID A LOT OF COOKING AND LATER THAT EVENING SHE BECAME CLAMMY, NAUSEATED, DIZZY, WEAK AND H/R WA FLUCTUATING BETWEEN 60'-70'S AND ONCE DOWN TO 59. STATED IT REALLY SCARED HER AND SHE DOESN'T EVER WANT TO FELL THAT AGAIN. SHELLI HENDRICKSON, PAC AWARE AND PROBABLY DUE TO BEING OVERLY TIRED AND DOING TO MUCH. PATIENT THOUGHT THIS MAY HAVE BEEN THE CASE ALSO. TO MONITOR AND CALL OFFICE BACK WITH ANY PROBLEMS. PH,LPN

## 2018-05-29 ENCOUNTER — TELEPHONE (OUTPATIENT)
Dept: CARDIOLOGY | Facility: CLINIC | Age: 78
End: 2018-05-29

## 2018-05-29 NOTE — TELEPHONE ENCOUNTER
"CALLED PATIENT WITH INR INSTRUCTIONS AND WIFE REQUESTED TO TALK WITH ME. STATES SHE IS TILL HAVING EPISODES OF NAUSEA AND WHAT SOUNDS LIKE PALPS. STATES \"I JUST DOEN'T KNOW WHAT ITS DOING IN THERE\".; APPT. SCHEDULED FOR JODI. PATIENT AWARE. RADHA,LPN  "

## 2018-05-30 ENCOUNTER — OFFICE VISIT (OUTPATIENT)
Dept: CARDIOLOGY | Facility: CLINIC | Age: 78
End: 2018-05-30

## 2018-05-30 VITALS
BODY MASS INDEX: 24.88 KG/M2 | HEIGHT: 66 IN | SYSTOLIC BLOOD PRESSURE: 138 MMHG | DIASTOLIC BLOOD PRESSURE: 77 MMHG | WEIGHT: 154.8 LBS | HEART RATE: 79 BPM | OXYGEN SATURATION: 96 %

## 2018-05-30 DIAGNOSIS — Z95.0 PACEMAKER: ICD-10-CM

## 2018-05-30 DIAGNOSIS — R07.2 PRECORDIAL PAIN: ICD-10-CM

## 2018-05-30 DIAGNOSIS — R00.2 PALPITATIONS: Primary | ICD-10-CM

## 2018-05-30 DIAGNOSIS — R06.02 SHORTNESS OF BREATH: ICD-10-CM

## 2018-05-30 PROCEDURE — 93000 ELECTROCARDIOGRAM COMPLETE: CPT | Performed by: PHYSICIAN ASSISTANT

## 2018-05-30 PROCEDURE — 99213 OFFICE O/P EST LOW 20 MIN: CPT | Performed by: PHYSICIAN ASSISTANT

## 2018-05-30 NOTE — PATIENT INSTRUCTIONS

## 2018-05-30 NOTE — PROGRESS NOTES
Problem list     Subjective   Nanci Pritchard is a 78 y.o. female     Chief Complaint   Patient presents with   • Palpitations     PATIENT APPEARS IN OFFICE TODAY STATING SHE HAS NOTICED INCREASED PALPITATIONS/FLUTTERS AT RANDOM OVER PAST 3 WEEKS   Problem List:  1. Sick sinus syndrome.  1.1. Greater than 3 second pause noted per event monitor.  1.2. The patient was subsequently admitted to [facility] with placement of dual-chamber pacemaker per Dr. Gutierrez.  1.3. Apparent lead dislodgment with subsequent fixation of the leads the following day post pacemaker placement.  2. Preserved systolic function  3. Low risk stress test, 09/2016.  4. Hypertension  5. Fibromyalgia.    HPI  The patient presents in today at her request.  She presents because of palpitations.  She describes irregularities and a tachycardic sensation.  This can last several minutes at a time.  Once experiencing palpitations, the patient will then noted significant shortness of air and chest discomfort.  She really has no shortness of air chest discomfort without the palpitations.  She describes episodes of dizziness with that as well.  She relates to no syncope.  She reports that her exercise capacity is adequate, as long was not expressing palpitations.  She denies PND or orthopnea.  Last interrogation was approximately November which suggested stable function but a short episode of SVT.  She has no further complaints otherwise.    Current Outpatient Prescriptions   Medication Sig Dispense Refill   • amLODIPine (NORVASC) 5 MG tablet Take 1 tablet by mouth daily. 90 tablet 3   • aspirin 81 MG EC tablet Take 81 mg by mouth daily.     • DULoxetine (CYMBALTA) 60 MG capsule Take 60 mg by mouth Daily.     • Evolocumab with Infusor (REPATHA PUSHTRONEX SYSTEM) 420 MG/3.5ML solution cartridge Inject  under the skin Every 30 (Thirty) Days.     • glimepiride (AMARYL) 2 MG tablet Take 4 mg by mouth Daily.     • lisinopril  (PRINIVIL,ZESTRIL) 20 MG tablet Take 20 mg by mouth Daily.     • metFORMIN (GLUCOPHAGE) 500 MG tablet Take 1,000 mg by mouth 2 (Two) Times a Day With Meals.     • metoprolol tartrate (LOPRESSOR) 50 MG tablet Take 1 tablet by mouth daily. 90 tablet 3   • omeprazole (priLOSEC) 40 MG capsule Take 40 mg by mouth Daily.       No current facility-administered medications for this visit.        Soma [carisoprodol]    Past Medical History:   Diagnosis Date   • Arrhythmia    • Fibromyalgia    • Histoplasmosis    • Hyperlipidemia    • Hypertension    • Osteoarthritis        Social History     Social History   • Marital status:      Spouse name: N/A   • Number of children: N/A   • Years of education: N/A     Occupational History   • Not on file.     Social History Main Topics   • Smoking status: Never Smoker   • Smokeless tobacco: Never Used   • Alcohol use No   • Drug use: No   • Sexual activity: Defer     Other Topics Concern   • Not on file     Social History Narrative   • No narrative on file       Family History   Problem Relation Age of Onset   • Heart disease Mother    • Heart disease Father        Review of Systems   Constitutional: Negative.  Negative for fatigue.   HENT: Negative.  Negative for congestion, rhinorrhea, sneezing and sore throat.    Eyes: Positive for visual disturbance (WEARS GLASSES DAILY).   Respiratory: Positive for chest tightness (CHEST PRESSURE/TIGHTNESS WITH EPISODES) and shortness of breath (EASILY SOA ; WORSE ON EXERTION ). Negative for apnea, cough and wheezing.    Cardiovascular: Positive for palpitations (INCREASE IN PALPITATIONS/FLUTTERS OVER LAST 3 WEEKS). Negative for chest pain (DENIES CP) and leg swelling.   Gastrointestinal: Positive for nausea (FREQUENT NAUSEA). Negative for abdominal distention, abdominal pain and vomiting.   Endocrine: Negative.  Negative for cold intolerance, heat intolerance, polyphagia and polyuria.   Genitourinary: Negative.  Negative for difficulty  "urinating and frequency.   Musculoskeletal: Positive for arthralgias (JOINTS). Negative for back pain, myalgias, neck pain and neck stiffness.   Skin: Negative.  Negative for rash and wound.   Allergic/Immunologic: Negative.  Negative for environmental allergies and food allergies.   Neurological: Positive for dizziness (DIZZINESS WITH CHANGE OF POSITION; STATES SHE HAS HAD THIS FOR YEARS). Negative for weakness, light-headedness and headaches.   Hematological: Negative.  Does not bruise/bleed easily.   Psychiatric/Behavioral: Negative.  Negative for agitation, confusion and sleep disturbance (DENIES WAKING UP SMOTHERING/SOA). The patient is not nervous/anxious.        Objective   Vitals:    05/30/18 1125   BP: 138/77   BP Location: Left arm   Patient Position: Sitting   Pulse: 79   SpO2: 96%   Weight: 70.2 kg (154 lb 12.8 oz)   Height: 167.6 cm (66\")      /77 (BP Location: Left arm, Patient Position: Sitting)   Pulse 79   Ht 167.6 cm (66\")   Wt 70.2 kg (154 lb 12.8 oz)   SpO2 96%   BMI 24.99 kg/m²    Lab Results (most recent)     None        Physical Exam   Constitutional: She is oriented to person, place, and time. She appears well-developed and well-nourished. No distress.   HENT:   Head: Normocephalic and atraumatic.   Eyes: Conjunctivae and EOM are normal. Pupils are equal, round, and reactive to light.   Neck: Normal range of motion. Neck supple. No JVD present. No tracheal deviation present.   Cardiovascular: Normal rate, regular rhythm, normal heart sounds and intact distal pulses.    Pulmonary/Chest: Effort normal and breath sounds normal.   Abdominal: Soft. Bowel sounds are normal. She exhibits no distension and no mass. There is no tenderness. There is no rebound and no guarding.   Musculoskeletal: Normal range of motion. She exhibits no edema, tenderness or deformity.   Neurological: She is alert and oriented to person, place, and time.   Skin: Skin is warm and dry. No rash noted. No erythema. " No pallor.   Psychiatric: She has a normal mood and affect. Her behavior is normal. Judgment and thought content normal.   Nursing note and vitals reviewed.        Procedure     ECG 12 Lead  Date/Time: 5/30/2018 11:26 AM  Performed by: SHELLI HENDRICKSON  Authorized by: SHELLI HENDRICKSON   Comments: PALPITATIONS      Sinus rhythm 75, normal axis, no acute changes noted.               Assessment/Plan      Diagnosis Plan   1. Palpitations  ECG 12 Lead   2. Precordial pain     3. Shortness of breath     4. Pacemaker       The patient reports significant palpitations with associated shortness of air and chest discomfort, all as above.  I will schedule her for an interrogation of her device in 2 days, which will be on Friday.  We can evaluate for dysrhythmic activity at that time.  We can adjust medications accordingly, if needed and if able, pending results of the same.  Last interrogation suggested episodes of SVT.  We will evaluate for dysrhythmic activity, nonetheless.  Further pending interrogation.  Previous workup otherwise has been unremarkable.  For any complications, the patient will call.  Further pending interrogation findings.             Patient's Body mass index is 24.99 kg/m². BMI is above normal parameters. Recommendations include: educational material and referral to primary care.             Electronically signed by:

## 2018-06-01 ENCOUNTER — OFFICE VISIT (OUTPATIENT)
Dept: CARDIOLOGY | Facility: CLINIC | Age: 78
End: 2018-06-01

## 2018-06-01 DIAGNOSIS — I49.5 SICK SINUS SYNDROME (HCC): Primary | ICD-10-CM

## 2018-06-01 PROCEDURE — 93288 INTERROG EVL PM/LDLS PM IP: CPT | Performed by: INTERNAL MEDICINE

## 2018-07-24 ENCOUNTER — TELEPHONE (OUTPATIENT)
Dept: CARDIOLOGY | Facility: CLINIC | Age: 78
End: 2018-07-24

## 2018-07-24 NOTE — TELEPHONE ENCOUNTER
Patient called stating that on July 15th she was sent her repatha injection but administered it wrong. She contacted the company and they were going to resend her another injection. Patient states she has yet to get the injection and is worried about being way past her due date. I informed her that she could stop by our office tomorrow to get the sample we have and go ahead and give her injection, patient verbalized understanding.

## 2018-07-25 ENCOUNTER — TELEPHONE (OUTPATIENT)
Dept: CARDIOLOGY | Facility: CLINIC | Age: 78
End: 2018-07-25

## 2018-07-25 NOTE — TELEPHONE ENCOUNTER
Patient presented today to  repatha pushtronex sample as she attempted to giver herself her repatha injection about 1 week ago and did not do it right as normally her sister does the injection for her. patient called the company and never sent another . patient asked if I could of sit down and walk he through  the steps of how to inject the repatha pushtronex. I thoroughly  went over instruction and instructed patient step by step on how to perform. Patient proceeded with injection and verbalized understanding,. Patient waited in our office 15 minutes post injection to make sure no reaction occured. Patient stated she felt fine and no welt, redness or rash noticeable. Patient is to call our office with questions or concerns. informed Ms. Pritchard that if she ever has questions or concerns to stop by our office and we could help assist with injection.      Lot# of sample of repatha 420/3.5 mg [id number] EXP: 04/2019

## 2018-08-09 ENCOUNTER — OFFICE VISIT (OUTPATIENT)
Dept: CARDIOLOGY | Facility: CLINIC | Age: 78
End: 2018-08-09

## 2018-08-09 VITALS
OXYGEN SATURATION: 97 % | SYSTOLIC BLOOD PRESSURE: 137 MMHG | HEIGHT: 66 IN | HEART RATE: 83 BPM | BODY MASS INDEX: 24.98 KG/M2 | WEIGHT: 155.4 LBS | DIASTOLIC BLOOD PRESSURE: 77 MMHG

## 2018-08-09 DIAGNOSIS — I10 ESSENTIAL HYPERTENSION: ICD-10-CM

## 2018-08-09 DIAGNOSIS — R06.02 SHORTNESS OF BREATH: Primary | ICD-10-CM

## 2018-08-09 DIAGNOSIS — R07.2 PRECORDIAL PAIN: ICD-10-CM

## 2018-08-09 PROCEDURE — 99213 OFFICE O/P EST LOW 20 MIN: CPT | Performed by: PHYSICIAN ASSISTANT

## 2018-08-09 NOTE — PROGRESS NOTES
Problem list     Subjective   Nanci Pritchard is a 78 y.o. female     Chief Complaint   Patient presents with   • Follow-up     patient appears in office today for 2 month follow up    • Palpitations   • Shortness of Breath   Problem List:  1. Sick sinus syndrome.  1.1. Greater than 3 second pause noted per event monitor.  1.2. The patient was subsequently admitted to [facility] with placement of dual-chamber pacemaker per Dr. Gutierrez.  1.3. Apparent lead dislodgment with subsequent fixation of the leads the following day post pacemaker placement.  2. Preserved systolic function  3. Low risk stress test, 09/2016.  4. Hypertension  5. Fibromyalgia.    HPI  The patient presents in today for routine evaluation follow-up.  Since last appointment, she has remained stable.  She still has episodes of tachycardia which are now very short-lived and nonproblematic.  Previous interrogations suggest its short episodes of SVT.  She feels that symptoms of palpitations are largely stable on metoprolol for the most part.  Her device interrogation suggested normal function otherwise.  The patient relates to chest discomfort which she feels could be related more to fibromyalgia.  She has stable dyspnea.  She has no PND or orthopnea.  Blood pressures of been very well-controlled.  The patient has no further complaints otherwise.    Current Outpatient Prescriptions   Medication Sig Dispense Refill   • amLODIPine (NORVASC) 5 MG tablet Take 1 tablet by mouth daily. 90 tablet 3   • aspirin 81 MG EC tablet Take 81 mg by mouth daily.     • DULoxetine (CYMBALTA) 60 MG capsule Take 60 mg by mouth Daily.     • Evolocumab with Infusor (REPATHA PUSHTRONEX SYSTEM) 420 MG/3.5ML solution cartridge Inject 1 Device under the skin into the appropriate area as directed Every 30 (Thirty) Days. 1 cartridge. 0   • glimepiride (AMARYL) 2 MG tablet Take 4 mg by mouth Daily.     • lisinopril (PRINIVIL,ZESTRIL) 20 MG tablet Take 20 mg  by mouth Daily.     • metFORMIN (GLUCOPHAGE) 500 MG tablet Take 1,000 mg by mouth 2 (Two) Times a Day With Meals.     • metoprolol tartrate (LOPRESSOR) 50 MG tablet Take 1 tablet by mouth daily. 90 tablet 3   • omeprazole (priLOSEC) 40 MG capsule Take 40 mg by mouth Daily.       No current facility-administered medications for this visit.        Soma [carisoprodol]    Past Medical History:   Diagnosis Date   • Arrhythmia    • Fibromyalgia    • Histoplasmosis    • Hyperlipidemia    • Hypertension    • Osteoarthritis        Social History     Social History   • Marital status:      Spouse name: N/A   • Number of children: N/A   • Years of education: N/A     Occupational History   • Not on file.     Social History Main Topics   • Smoking status: Never Smoker   • Smokeless tobacco: Never Used   • Alcohol use No   • Drug use: No   • Sexual activity: Defer     Other Topics Concern   • Not on file     Social History Narrative   • No narrative on file       Family History   Problem Relation Age of Onset   • Heart disease Mother    • Heart disease Father        Review of Systems   Constitutional: Negative.  Negative for fatigue.   HENT: Negative.  Negative for congestion, rhinorrhea, sneezing and sore throat.    Eyes: Positive for visual disturbance (wears glasses daily).   Respiratory: Positive for chest tightness (occaisonal chest tightness) and shortness of breath (easily SOA ; worse on exertion ). Negative for apnea, cough and wheezing.    Cardiovascular: Positive for palpitations (occasional palpitations). Negative for chest pain (denies CP) and leg swelling.   Gastrointestinal: Negative.  Negative for abdominal distention, abdominal pain, nausea and vomiting.   Endocrine: Negative.  Negative for cold intolerance, heat intolerance, polyphagia and polyuria.   Genitourinary: Negative.  Negative for difficulty urinating, frequency and urgency.   Musculoskeletal: Positive for arthralgias (joints). Negative for back  "pain, myalgias, neck pain and neck stiffness.   Skin: Negative.  Negative for rash and wound.   Allergic/Immunologic: Negative.  Negative for environmental allergies and food allergies.   Neurological: Negative.  Negative for dizziness, syncope, weakness, light-headedness and headaches.   Hematological: Negative.  Does not bruise/bleed easily.   Psychiatric/Behavioral: Negative.  Negative for agitation, confusion and sleep disturbance (denies waking up smothering/SOA). The patient is not nervous/anxious.        Objective   Vitals:    08/09/18 0925   BP: 137/77   BP Location: Left arm   Patient Position: Sitting   Pulse: 83   SpO2: 97%   Weight: 70.5 kg (155 lb 6.4 oz)   Height: 167.6 cm (66\")      /77 (BP Location: Left arm, Patient Position: Sitting)   Pulse 83   Ht 167.6 cm (66\")   Wt 70.5 kg (155 lb 6.4 oz)   SpO2 97%   BMI 25.08 kg/m²    Lab Results (most recent)     None        Physical Exam   Constitutional: She is oriented to person, place, and time. She appears well-developed and well-nourished. No distress.   HENT:   Head: Normocephalic and atraumatic.   Eyes: Pupils are equal, round, and reactive to light. Conjunctivae and EOM are normal.   Neck: Normal range of motion. Neck supple. No JVD present. No tracheal deviation present.   Cardiovascular: Normal rate, regular rhythm, normal heart sounds and intact distal pulses.    Pulmonary/Chest: Effort normal and breath sounds normal.   Abdominal: Soft. Bowel sounds are normal. She exhibits no distension and no mass. There is no tenderness. There is no rebound and no guarding.   Musculoskeletal: Normal range of motion. She exhibits no edema, tenderness or deformity.   Neurological: She is alert and oriented to person, place, and time.   Skin: Skin is warm and dry. No rash noted. No erythema. No pallor.   Psychiatric: She has a normal mood and affect. Her behavior is normal. Judgment and thought content normal.   Nursing note and vitals " reviewed.        Procedure   Procedures       Assessment/Plan      Diagnosis Plan   1. Shortness of breath     2. Precordial pain     3. Essential hypertension       The patient describes atypical symptoms.  Symptoms of palpitations are minimal at this time.  Pacemaker interrogation suggests table function.  I will make no changes.  We can see the patient back in 4 to 6 months, sooner if indicated by course.              Patient's Body mass index is 25.08 kg/m². BMI is within normal parameters. No follow-up required.             Electronically signed by:

## 2018-10-29 ENCOUNTER — DOCUMENTATION (OUTPATIENT)
Dept: CARDIOLOGY | Facility: CLINIC | Age: 78
End: 2018-10-29

## 2018-10-29 NOTE — PROGRESS NOTES
Patient states she was lifting a heavy garbage bag out of a tall garbage can last Thursday when it was raining. Next day and still day she is having discomfort to left chest area around PPM site and in leg arm, thinks its just a pulled muscle but  wanted us to know. Patient denied feeling anything at PPM site at the time and she verbalized she thinks it is fine. She denied any change to pacemaker site etc. Edilson Souza, PAC aware just watch at this time. PH,LPN

## 2018-11-12 ENCOUNTER — TELEPHONE (OUTPATIENT)
Dept: CARDIOLOGY | Facility: CLINIC | Age: 78
End: 2018-11-12

## 2018-11-12 NOTE — TELEPHONE ENCOUNTER
CALLED PATIENT BACK TO NOTIFY OF INR RESULTS AND WIFE ANSWERED PHONE. SHE STATES SHE RECEIVED A LETTER FROM SYLVAIN SNOWDENG SHE NEEDS TO FILL OUT AM APPLICATION TO RENEW HER REPATHA, THAT OUR OFFICE NEEDED TO PRESCRIBE IT, AND THAT SHE WOULD HAVE TO SEND IN PROOF OF INSURANCE WITH IT. SHE STATES WHOMEVER SHE TALKED TO AT [facility] ABOUT IT, TOLD HER TO START THE PROCESS NOW. STATES THE [facility] HELPED HER DO IT THE FIRST TIME, BUT THEY AREN'T ALLOWED TO ANYMORE. SHE DOESN'T KNOW WHERE TO GET THE APPLICATION AT. SHE USES [email or web address] NOW. TOLD HER I WOULD HAVE CJ SAUCEDA LOOM INTO THIS AND SHE WILL CALL HER BACK IN A DAY OR SO. PH,LPN

## 2018-11-13 NOTE — TELEPHONE ENCOUNTER
Contacted [facility] [employer] to download form. Patient notified forms were completed and ready for her signature. Patient to stop by office tomorrow to sign forms before being faxed. Estefania Pierson MA

## 2018-12-07 ENCOUNTER — OFFICE VISIT (OUTPATIENT)
Dept: CARDIOLOGY | Facility: CLINIC | Age: 78
End: 2018-12-07

## 2018-12-07 VITALS
HEART RATE: 85 BPM | OXYGEN SATURATION: 97 % | DIASTOLIC BLOOD PRESSURE: 78 MMHG | SYSTOLIC BLOOD PRESSURE: 137 MMHG | WEIGHT: 154 LBS | HEIGHT: 66 IN | BODY MASS INDEX: 24.75 KG/M2

## 2018-12-07 DIAGNOSIS — R07.2 PRECORDIAL PAIN: ICD-10-CM

## 2018-12-07 DIAGNOSIS — I48.0 PAROXYSMAL ATRIAL FIBRILLATION (HCC): ICD-10-CM

## 2018-12-07 DIAGNOSIS — R00.2 PALPITATIONS: Primary | ICD-10-CM

## 2018-12-07 DIAGNOSIS — R06.02 SHORTNESS OF BREATH: ICD-10-CM

## 2018-12-07 DIAGNOSIS — I49.5 SICK SINUS SYNDROME (HCC): Primary | ICD-10-CM

## 2018-12-07 PROCEDURE — 93280 PM DEVICE PROGR EVAL DUAL: CPT | Performed by: INTERNAL MEDICINE

## 2018-12-07 PROCEDURE — 99213 OFFICE O/P EST LOW 20 MIN: CPT | Performed by: PHYSICIAN ASSISTANT

## 2018-12-17 ENCOUNTER — PRIOR AUTHORIZATION (OUTPATIENT)
Dept: CARDIOLOGY | Facility: CLINIC | Age: 78
End: 2018-12-17

## 2018-12-17 NOTE — TELEPHONE ENCOUNTER
Patient approved to receive Repatha through the [facility] from 12/15/18 until 12/31/19. Estefania Pierson MA

## 2019-01-08 ENCOUNTER — TELEPHONE (OUTPATIENT)
Dept: CARDIOLOGY | Facility: CLINIC | Age: 79
End: 2019-01-08

## 2019-01-08 NOTE — TELEPHONE ENCOUNTER
"Dejah at [facility] stated Eliquis is $425.38 a month for patient. She stated using [facility] does not help much with copay. Medication does not require a PA. She is unable to tell what the patient's deductible is. Patient assistance form completed.    Notified patient I have assistance form completed and ready for her household information and signature. Patient stated she will stop by office tomorrow to complete her section.    Komal Pierson MA        ----- Message from Charleen Jorgensen LPN sent at 1/7/2019  5:53 PM EST -----  Contact: [phone]  STATES ELIQUIS COST'S 300.00 AND SHE CANT' AFFORD IT. KOMAL PLEASE CHECK WITH PHARMACY AND SEE IF  APA ISSUES OR WHAT?.   GLORIA, PATIENT STATES SAT. AFTER RETURNING FROM GETTING GROCERIES SHE LIFTED SOME HEAVY WATER AND FOR ABOUT AN HOUR SHE HAD \"BUTTERFLIES\" IN HER CHEST AND SEVERELY NAUSEATED. LASTED APPROX. AN HOUR. INITIALLY H/R  THEN DOWN , AND JUMPED AROUND TO 95,  ETC. STATES IT SCARES HER AND SHE NEVER KNOWS WHEN EPISODE IS GOING TO OCCUR. SHE IS WILLING TO TAKE ANT-IDYSRHY. MED IF CAN AFFORD IT. PLEASE DISCUSS WITH SHELLI. PLEASE LET HER KNOW JODI. THANKS JESSI MARTINEZ    "

## 2019-01-09 ENCOUNTER — TELEPHONE (OUTPATIENT)
Dept: CARDIOLOGY | Facility: CLINIC | Age: 79
End: 2019-01-09

## 2019-01-09 NOTE — TELEPHONE ENCOUNTER
"----- Message from Charleen Jorgensen LPN sent at 1/7/2019  5:53 PM EST -----  Contact: [phone]  STATES LONG COST'S 300.00 AND SHE CANT' AFFORD IT. KOMAL PLEASE CHECK WITH PHARMACY AND SEE IF  APA ISSUES OR WHAT?.   GLORIA, PATIENT STATES SAT. AFTER RETURNING FROM GETTING GROCERIES SHE LIFTED SOME HEAVY WATER AND FOR ABOUT AN HOUR SHE HAD \"BUTTERFLIES\" IN HER CHEST AND SEVERELY NAUSEATED. LASTED APPROX. AN HOUR. INITIALLY H/R  THEN DOWN , AND JUMPED AROUND TO 95,  ETC. STATES IT SCARES HER AND SHE NEVER KNOWS WHEN EPISODE IS GOING TO OCCUR. SHE IS WILLING TO TAKE ANT-IDYSRHY. MED IF CAN AFFORD IT. PLEASE DISCUSS WITH SHELLI. PLEASE LET HER KNOW JODI. THANKS JESSI MARTINEZ  "

## 2019-01-09 NOTE — TELEPHONE ENCOUNTER
Provider Edilson Souza PA-C reviewed patients message. Verbal orders given for patient to come into office for interrogation only. Patient was notified @ 0630 PM , notified to be here tomorrow @ 0330 PM for interrogation. RADHA Deshpande notified rep as well. -[facility]

## 2019-01-10 ENCOUNTER — CLINICAL SUPPORT (OUTPATIENT)
Dept: CARDIOLOGY | Facility: CLINIC | Age: 79
End: 2019-01-10

## 2019-01-10 DIAGNOSIS — I49.5 SICK SINUS SYNDROME (HCC): Primary | ICD-10-CM

## 2019-01-10 PROCEDURE — 93288 INTERROG EVL PM/LDLS PM IP: CPT | Performed by: INTERNAL MEDICINE

## 2019-01-16 ENCOUNTER — TELEPHONE (OUTPATIENT)
Dept: CARDIOLOGY | Facility: CLINIC | Age: 79
End: 2019-01-16

## 2019-01-16 RX ORDER — FUROSEMIDE 20 MG/1
TABLET ORAL
Qty: 30 TABLET | Refills: 0 | Status: SHIPPED | OUTPATIENT
Start: 2019-01-16 | End: 2019-10-03 | Stop reason: SDUPTHER

## 2019-01-16 RX ORDER — POTASSIUM CHLORIDE 750 MG/1
TABLET, FILM COATED, EXTENDED RELEASE ORAL
Qty: 30 TABLET | Refills: 0 | Status: SHIPPED | OUTPATIENT
Start: 2019-01-16 | End: 2020-06-17

## 2019-01-16 NOTE — TELEPHONE ENCOUNTER
Comments     01/14/2019: will discuss with provider Edilson Souza PA-C on 01/15/2019. -;[facility]    0115/2019: Lasix 20 mg for 3 days then PRN. Potassium 10 meq. -;[facility]    01/15/2019: called and left message at 610 PM. -; [facility]    01/16/2019: patient called back at 1203 PM. [facility]              Patient was notified @ 0511 PM of new verbal orders. -;[facility]

## 2019-01-16 NOTE — TELEPHONE ENCOUNTER
----- Message from Charleen Jorgensen LPN sent at 1/14/2019  6:36 PM EST -----  Contact: [phone]  3 DAYS AGO LEGS/FEET/ANKLES STARTED SWELLING REAL BAD AND IN THE EVENING THEY GET RED AND ANKLES ITCH HORRIBLE. ? ALLERGY TO ELIQUIS SHE ASKED.

## 2019-01-24 ENCOUNTER — LAB (OUTPATIENT)
Dept: LAB | Facility: HOSPITAL | Age: 79
End: 2019-01-24

## 2019-01-24 ENCOUNTER — TELEPHONE (OUTPATIENT)
Dept: CARDIOLOGY | Facility: CLINIC | Age: 79
End: 2019-01-24

## 2019-01-24 DIAGNOSIS — I48.0 PAROXYSMAL ATRIAL FIBRILLATION (HCC): Primary | ICD-10-CM

## 2019-01-24 DIAGNOSIS — I48.0 PAROXYSMAL ATRIAL FIBRILLATION (HCC): ICD-10-CM

## 2019-01-24 PROCEDURE — 36415 COLL VENOUS BLD VENIPUNCTURE: CPT

## 2019-01-24 PROCEDURE — 80048 BASIC METABOLIC PNL TOTAL CA: CPT | Performed by: PHYSICIAN ASSISTANT

## 2019-01-24 NOTE — TELEPHONE ENCOUNTER
Comments     01/23/2019: patient came by office , stated she was denied for patient assistance and medicine was going to cost $299.00 per month. Patient was notified that I would discuss it with Edilson Souza PA-C and let her know what he says. - ; [facility]    01/24/2019: spoke with provider Edilson Souza PA-C. Patient may be changed to Xarelto. Patient will need to have labs prior to starting to evalu renal function. Patient was notified , stated she would come by today for labs. Once labs are received she will be notified with further direction. -[facility]

## 2019-01-24 NOTE — TELEPHONE ENCOUNTER
----- Message from Estefania Pierson MA sent at 1/22/2019  6:27 PM EST -----  Contact: [phone]  I spoke to pharmacy documented in 1/8/19 note. Medication does not require a PA. Her copay is very high. She may be able to call [phone] Eliquis to see if she qualifies for any additional help however I faxed the patient assistance forms as documented in the note. Apparently she was denied assistance, nothing has been sent to our office. Edilson may want to change her medication or keep her in samples.     ----- Message -----  From: Yanira Mcwilliams MA  Sent: 1/22/2019   6:10 PM  To: Estefania Pierson MA    Spoke with provider today, what options do we have left? -[facility]  ----- Message -----  From: Charleen Jorgensen LPN  Sent: 1/21/2019   4:17 PM  To: Yanira Mcwilliams MA    Patient states received a letter from insurance stating Eliquis not approved. Did not see anything in the chart. Discuss with Estefania and see what she tells you to.

## 2019-01-24 NOTE — TELEPHONE ENCOUNTER
----- Message from Estefania Pierson MA sent at 1/22/2019  6:27 PM EST -----  Contact: [phone]  I spoke to pharmacy documented in 1/8/19 note. Medication does not require a PA. Her copay is very high. She may be able to call [phone] Eliquis to see if she qualifies for any additional help however I faxed the patient assistance forms as documented in the note. Apparently she was denied assistance, nothing has been sent to our office. Edilson may want to change her medication or keep her in samples.     ----- Message -----  From: Yanira Mcwilliams MA  Sent: 1/22/2019   6:10 PM  To: Estefania Pierson MA    Spoke with provider today, what options do we have left? -[facility]  ----- Message -----  From: Charleen Jorgensen LPN  Sent: 1/21/2019   4:17 PM  To: Yanira Mcwilliams MA    Patient states received a letter from insurance stating Eliquis not approved. Did not see anything in the chart. Discuss with Estefania and see what she tells you to.

## 2019-01-25 LAB
ANION GAP SERPL CALCULATED.3IONS-SCNC: 13.5 MMOL/L (ref 3.6–11.2)
BUN BLD-MCNC: 13 MG/DL (ref 7–21)
BUN/CREAT SERPL: 17.8 (ref 7–25)
CALCIUM SPEC-SCNC: 10.1 MG/DL (ref 7.7–10)
CHLORIDE SERPL-SCNC: 103 MMOL/L (ref 99–112)
CO2 SERPL-SCNC: 24.5 MMOL/L (ref 24.3–31.9)
CREAT BLD-MCNC: 0.73 MG/DL (ref 0.43–1.29)
GFR SERPL CREATININE-BSD FRML MDRD: 78 ML/MIN/1.73
GLUCOSE BLD-MCNC: 113 MG/DL (ref 70–110)
OSMOLALITY SERPL CALC.SUM OF ELEC: 282.2 MOSM/KG (ref 273–305)
POTASSIUM BLD-SCNC: 4.2 MMOL/L (ref 3.5–5.3)
SODIUM BLD-SCNC: 141 MMOL/L (ref 135–153)

## 2019-02-01 ENCOUNTER — TELEPHONE (OUTPATIENT)
Dept: CARDIOLOGY | Facility: CLINIC | Age: 79
End: 2019-02-01

## 2019-02-01 NOTE — TELEPHONE ENCOUNTER
Called patient and notified her , pt given 30 day trial card as well for Xarelto. Patient was instructed to take 20 mg once daily. Patient verbalized understanding and had no further questions at this time. Provider gave verbal for pt to wait 2 days once stopping Eliquis and starting Xarelto. Pt notified and verbalized understanding. -;[facility]    ---- Message from RAYA Louie sent at 1/31/2019 12:00 PM EST -----  May start Xarelto 20 mg daily based on creatinine clearance.  ----- Message -----  From: Yanira Mcwilliams MA  Sent: 1/29/2019  12:54 PM  To: RAYA Louie    Please review recent labs and let me know what dosing of Xarelto we need to put her on please. -;[facility]

## 2019-02-08 ENCOUNTER — OFFICE VISIT (OUTPATIENT)
Dept: CARDIOLOGY | Facility: CLINIC | Age: 79
End: 2019-02-08

## 2019-02-08 VITALS
DIASTOLIC BLOOD PRESSURE: 88 MMHG | WEIGHT: 156.6 LBS | OXYGEN SATURATION: 96 % | HEART RATE: 76 BPM | HEIGHT: 66 IN | SYSTOLIC BLOOD PRESSURE: 154 MMHG | BODY MASS INDEX: 25.17 KG/M2

## 2019-02-08 DIAGNOSIS — I10 ESSENTIAL HYPERTENSION: ICD-10-CM

## 2019-02-08 DIAGNOSIS — R00.2 PALPITATIONS: ICD-10-CM

## 2019-02-08 DIAGNOSIS — R06.02 SHORTNESS OF BREATH: ICD-10-CM

## 2019-02-08 DIAGNOSIS — I48.91 ATRIAL FIBRILLATION, UNSPECIFIED TYPE (HCC): Primary | ICD-10-CM

## 2019-02-08 PROCEDURE — 99213 OFFICE O/P EST LOW 20 MIN: CPT | Performed by: NURSE PRACTITIONER

## 2019-02-08 NOTE — PROGRESS NOTES
Subjective   Nanci Pritchard is a 78 y.o. female     Chief Complaint   Patient presents with   • Hypertension     Here for pacer f/u   • Hyperlipidemia       HPI    Problem List:  1. Sick sinus syndrome.  1.1. Greater than 3 second pause noted per event monitor.  1.2. The patient was subsequently admitted to [facility] with placement of dual-chamber pacemaker per Dr. Gutierrez.  1.3. Apparent lead dislodgment with subsequent fixation of the leads the following day post pacemaker placement.  2. Preserved systolic function  3. Low risk stress test, 09/2016.  4. Hypertension  5. Atrial Fibrillation  6. Fibromyalgia.      The patient presents today accompanied by her sister for follow up.  She was last seen by RAYA Harris on 12/7/2019.  She was noted to have new onset of atrial fibrillation during a pacemaker interrogation at that time.  The patient had also c/o chest pain, shortness of air.  A stress test and echocardiogram were recommended, however the patient declined stating that she felt like the pain was related to her fibromyalgia.  The patient was started on Eliquis.      Since her visit the patient was changed to Xarelto.  She denies any bleeding or recent falls.  She states that she is doing well.  She denies any recent chest pain.  She states that she still feels her heart fluttering at times, but it seems to get better with rest.  She continues to experience shortness of breath with mild or moderate activity.  The patient feels like she is doing well and would like to continue her current regimen and does not wish to proceed with non-invasive testing at this time.     Current Outpatient Medications   Medication Sig Dispense Refill   • amLODIPine (NORVASC) 5 MG tablet Take 1 tablet by mouth daily. 90 tablet 3   • aspirin 81 MG EC tablet Take 81 mg by mouth daily.     • DULoxetine (CYMBALTA) 60 MG capsule Take 60 mg by mouth Daily.     • Evolocumab with Infusor (REPATHA PUSHTRONEX  SYSTEM) 420 MG/3.5ML solution cartridge Inject 1 Device under the skin into the appropriate area as directed Every 30 (Thirty) Days. 1 cartridge. 0   • furosemide (LASIX) 20 MG tablet Take 1 tablet daily for 3 pound weight gain (Patient taking differently: Daily As Needed. Take 1 tablet daily for 3 pound weight gain) 30 tablet 0   • glimepiride (AMARYL) 2 MG tablet Take 4 mg by mouth Daily.     • lisinopril (PRINIVIL,ZESTRIL) 20 MG tablet Take 20 mg by mouth Daily.     • metFORMIN (GLUCOPHAGE) 500 MG tablet Take 1,000 mg by mouth 2 (Two) Times a Day With Meals.     • metoprolol tartrate (LOPRESSOR) 50 MG tablet Take 1 tablet by mouth daily. 90 tablet 3   • omeprazole (priLOSEC) 40 MG capsule Take 40 mg by mouth Daily.     • potassium chloride (K-DUR) 10 MEQ CR tablet Take with Lasix 30 tablet 0   • rivaroxaban (XARELTO) 20 MG tablet Take 1 tablet by mouth Daily. 30 tablet 3     No current facility-administered medications for this visit.        ALLERGIES    Soma [carisoprodol]    Past Medical History:   Diagnosis Date   • Arrhythmia    • Fibromyalgia    • Histoplasmosis    • Hyperlipidemia    • Hypertension    • Osteoarthritis    • SSS (sick sinus syndrome) (CMS/[facility])        Social History     Socioeconomic History   • Marital status:      Spouse name: Not on file   • Number of children: Not on file   • Years of education: Not on file   • Highest education level: Not on file   Social Needs   • Financial resource strain: Not on file   • Food insecurity - worry: Not on file   • Food insecurity - inability: Not on file   • Transportation needs - medical: Not on file   • Transportation needs - non-medical: Not on file   Occupational History   • Not on file   Tobacco Use   • Smoking status: Never Smoker   • Smokeless tobacco: Never Used   Substance and Sexual Activity   • Alcohol use: No   • Drug use: No   • Sexual activity: Defer   Other Topics Concern   • Not on file   Social History Narrative   • Not on file  "      Family History   Problem Relation Age of Onset   • Heart disease Mother    • Heart disease Father        Review of Systems   Constitutional: Positive for fatigue (improved).   HENT: Positive for hearing loss.    Eyes: Positive for visual disturbance (glasses prn).   Respiratory: Positive for shortness of breath (occas.).    Cardiovascular: Positive for chest pain (with over exertion), palpitations and leg swelling.   Gastrointestinal: Negative.    Endocrine: Negative.    Genitourinary: Negative.    Musculoskeletal: Positive for arthralgias and myalgias.   Skin: Negative.    Allergic/Immunologic: Negative.    Neurological: Negative.    Hematological: Bruises/bleeds easily.   Psychiatric/Behavioral: Negative.        Objective   /88 (BP Location: Left arm, Patient Position: Sitting)   Pulse 76   Ht 167.6 cm (65.98\")   Wt 71 kg (156 lb 9.6 oz)   SpO2 96%   BMI 25.29 kg/m²   Vitals:    02/08/19 0929   BP: 154/88   BP Location: Left arm   Patient Position: Sitting   Pulse: 76   SpO2: 96%   Weight: 71 kg (156 lb 9.6 oz)   Height: 167.6 cm (65.98\")      Lab Results (most recent)     None        Physical Exam  Physical Exam   Constitutional: She is oriented to person, place, and time. She appears well-developed and well-nourished. No distress.   HENT:   Head: Normocephalic and atraumatic.   Eyes: Conjunctivae and EOM are normal. Pupils are equal, round, and reactive to light.   Neck: Normal range of motion. Neck supple. No JVD present. No tracheal deviation present.   Cardiovascular: Normal rate, regular rhythm, normal heart sounds and intact distal pulses.   Pulmonary/Chest: Effort normal and breath sounds normal.   Abdominal: Soft. Bowel sounds are normal. She exhibits no distension and no mass. There is no tenderness. There is no rebound and no guarding.   Musculoskeletal: Normal range of motion. She exhibits no edema, tenderness or deformity.   Neurological: She is alert and oriented to person, place, " and time.   Skin: Skin is warm and dry. No rash noted. No erythema. No pallor.   Psychiatric: She has a normal mood and affect. Her behavior is normal. Judgment and thought content normal.   Nursing note and vitals reviewed.    Procedure   Procedures         Assessment/Plan           Diagnosis Plan   1. Atrial fibrillation, unspecified type (CMS/HCC)     2. Essential hypertension     3. Palpitations     4. Shortness of breath       Nanci seem to be doing well. She will continue taking Xarelto for anticaogulation.  She will report any signs or abnormal bleeding.  The patient will continue her current medication regimen and return in 3 months for her regular scheduled appointment with Edilson Souza for follow-up, she will also have her pacemaker interrogation performed at that time.   Return in about 3 months (around 4/25/2019) for Next scheduled follow up.               Patient's Body mass index is 25.29 kg/m². BMI is within normal parameters. No follow-up required..      Electronically signed by:

## 2019-02-21 NOTE — PATIENT INSTRUCTIONS
Rivaroxaban oral tablets  What is this medicine?  RIVAROXABAN (ri va IAN a ban) is an anticoagulant (blood thinner). It is used to treat blood clots in the lungs or in the veins. It is also used after knee or hip surgeries to prevent blood clots. It is also used to lower the chance of stroke in people with a medical condition called atrial fibrillation.  This medicine may be used for other purposes; ask your health care provider or pharmacist if you have questions.  COMMON BRAND NAME(S): Xarelto, Xarelto Starter Pack  What should I tell my health care provider before I take this medicine?  They need to know if you have any of these conditions:  -bleeding disorders  -bleeding in the brain  -blood in your stools (black or tarry stools) or if you have blood in your vomit  -history of stomach bleeding  -kidney disease  -liver disease  -low blood counts, like low white cell, platelet, or red cell counts  -recent or planned spinal or epidural procedure  -take medicines that treat or prevent blood clots  -an unusual or allergic reaction to rivaroxaban, other medicines, foods, dyes, or preservatives  -pregnant or trying to get pregnant  -breast-feeding  How should I use this medicine?  Take this medicine by mouth with a glass of water. Follow the directions on the prescription label. Take your medicine at regular intervals. Do not take it more often than directed. Do not stop taking except on your doctor's advice. Stopping this medicine may increase your risk of a blood clot. Be sure to refill your prescription before you run out of medicine.  If you are taking this medicine after hip or knee replacement surgery, take it with or without food. If you are taking this medicine for atrial fibrillation, take it with your evening meal. If you are taking this medicine to treat blood clots, take it with food at the same time each day. If you are unable to swallow your tablet, you may crush the tablet and mix it in applesauce. Then,  immediately eat the applesauce. You should eat more food right after you eat the applesauce containing the crushed tablet.  Talk to your pediatrician regarding the use of this medicine in children. Special care may be needed.  Overdosage: If you think you have taken too much of this medicine contact a poison control center or emergency room at once.  NOTE: This medicine is only for you. Do not share this medicine with others.  What if I miss a dose?  If you take your medicine once a day and miss a dose, take the missed dose as soon as you remember. If you take your medicine twice a day and miss a dose, take the missed dose immediately. In this instance, 2 tablets may be taken at the same time. The next day you should take 1 tablet twice a day as directed.  What may interact with this medicine?  Do not take this medicine with any of the following medications:  -defibrotide  This medicine may also interact with the following medications:  -aspirin and aspirin-like medicines  -certain antibiotics like erythromycin, azithromycin, and clarithromycin  -certain medicines for fungal infections like ketoconazole and itraconazole  -certain medicines for irregular heart beat like amiodarone, quinidine, dronedarone  -certain medicines for seizures like carbamazepine, phenytoin  -certain medicines that treat or prevent blood clots like warfarin, enoxaparin, and dalteparin  -conivaptan  -diltiazem  -felodipine  -indinavir  -lopinavir; ritonavir  -NSAIDS, medicines for pain and inflammation, like ibuprofen or naproxen  -ranolazine  -rifampin  -ritonavir  -SNRIs, medicines for depression, like desvenlafaxine, duloxetine, levomilnacipran, venlafaxine  -SSRIs, medicines for depression, like citalopram, escitalopram, fluoxetine, fluvoxamine, paroxetine, sertraline  -Weaver's wort  -verapamil  This list may not describe all possible interactions. Give your health care provider a list of all the medicines, herbs, non-prescription  drugs, or dietary supplements you use. Also tell them if you smoke, drink alcohol, or use illegal drugs. Some items may interact with your medicine.  What should I watch for while using this medicine?  Visit your doctor or health care professional for regular checks on your progress.  Notify your doctor or health care professional and seek emergency treatment if you develop breathing problems; changes in vision; chest pain; severe, sudden headache; pain, swelling, warmth in the leg; trouble speaking; sudden numbness or weakness of the face, arm or leg. These can be signs that your condition has gotten worse.  If you are going to have surgery or other procedure, tell your doctor that you are taking this medicine.  What side effects may I notice from receiving this medicine?  Side effects that you should report to your doctor or health care professional as soon as possible:  -allergic reactions like skin rash, itching or hives, swelling of the face, lips, or tongue  -back pain  -redness, blistering, peeling or loosening of the skin, including inside the mouth  -signs and symptoms of bleeding such as bloody or black, tarry stools; red or dark-brown urine; spitting up blood or brown material that looks like coffee grounds; red spots on the skin; unusual bruising or bleeding from the eye, gums, or nose  Side effects that usually do not require medical attention (report to your doctor or health care professional if they continue or are bothersome):  -dizziness  -muscle pain  This list may not describe all possible side effects. Call your doctor for medical advice about side effects. You may report side effects to FDA at 6-698-FDA-0830.  Where should I keep my medicine?  Keep out of the reach of children.  Store at room temperature between 15 and 30 degrees C (59 and 86 degrees F). Throw away any unused medicine after the expiration date.  NOTE: This sheet is a summary. It may not cover all possible information. If you  have questions about this medicine, talk to your doctor, pharmacist, or health care provider.  © 2018 [employer] Standard (2017-09-06 16:29:33)

## 2019-03-19 ENCOUNTER — TELEPHONE (OUTPATIENT)
Dept: CARDIOLOGY | Facility: CLINIC | Age: 79
End: 2019-03-19

## 2019-03-19 NOTE — TELEPHONE ENCOUNTER
----- Message from RAYA Louie sent at 3/19/2019  1:44 PM EDT -----  Would patient consider alternate antidysrhythmic therapy?  ----- Message -----  From: Pebbles Manuel  Sent: 3/19/2019  12:16 PM  To: RAYA Louie, Yanira Mcwilliams MA    Pt presented in clinic w/ sister-in-law stating that pt needs samples of Xarelto, but that she's also had consistent frontal lobe HA since starting Xarelto. States that the headaches are debilitating, denies any hx of HA.   I told them that I wasn't going to check for samples, if rx needs to be changed. I informed them that we would contact them this afternoon, as provider was at lunch.   Pt would like a call back as soon as possible, sister-in-law can be reached at [phone], okay to leave a detailed message.

## 2019-03-19 NOTE — TELEPHONE ENCOUNTER
Pt presented in clinic when I was busy w/ a different patient. Leona spoke w/ pt, routing to her for documentation.

## 2019-03-26 NOTE — TELEPHONE ENCOUNTER
Pt presented to office, post stopping xarelto with H/A beginning to subside.  Pt states she d/c xarelto due to frontal migraine type h/a , pt states she has had since starting xarelto.  Edilson Souza PA-C reviewed, per verbal order begin Eliquis 5 mg BID, pt applied for patient assistance.  Faxed application 3-22-19.  LUIS,CMA

## 2019-04-16 ENCOUNTER — TELEPHONE (OUTPATIENT)
Dept: CARDIOLOGY | Facility: CLINIC | Age: 79
End: 2019-04-16

## 2019-04-16 NOTE — TELEPHONE ENCOUNTER
"CALLED  TO DISCUSS INR RESULTS AND WIFE STATES HAD A BAD FALL DOWN SOME STEPS ABOUT 2 DAYS AGO, FELL ON HER HIP AND LEFT SIDE, BUT DENIES HITTING ANYWHERE CLOSE TO PACEMAKER SITE. STATES FALL \"STACEY\" HER B BETTIE. STATES HAS BRUISES TO LEFT SIDE, RIBS, BACK, UNDERNEATH LEFT ARM, CHEST SORENESS. THEN WENT ON TO SAY SHE THINKS SHE IS FINE. THEN WENT ON TO SAY THAT SHE HAD A FIB EPISODE RECENTLY WITH RATE UP  THAT LASTED FOR ABOUT AN HOUR. TOLD HER I WOULD MENTION IT TO SHELLI AND IF HE WANTED ANYTHING DONE I WOULD CALL HER BACK. STATED OK. SHELLI HENDRICKSON, PAC AWARE OF ABOVE, NO NEW ORDERS. PH,LPN  "

## 2019-04-25 ENCOUNTER — OFFICE VISIT (OUTPATIENT)
Dept: CARDIOLOGY | Facility: CLINIC | Age: 79
End: 2019-04-25

## 2019-04-25 VITALS
HEIGHT: 66 IN | HEART RATE: 79 BPM | DIASTOLIC BLOOD PRESSURE: 70 MMHG | SYSTOLIC BLOOD PRESSURE: 118 MMHG | BODY MASS INDEX: 24.59 KG/M2 | WEIGHT: 153 LBS | OXYGEN SATURATION: 98 %

## 2019-04-25 DIAGNOSIS — R00.2 PALPITATIONS: ICD-10-CM

## 2019-04-25 DIAGNOSIS — R51.9 NONINTRACTABLE HEADACHE, UNSPECIFIED CHRONICITY PATTERN, UNSPECIFIED HEADACHE TYPE: ICD-10-CM

## 2019-04-25 DIAGNOSIS — I10 ESSENTIAL HYPERTENSION: ICD-10-CM

## 2019-04-25 DIAGNOSIS — R06.02 SHORTNESS OF BREATH: ICD-10-CM

## 2019-04-25 DIAGNOSIS — Z95.0 PACEMAKER: ICD-10-CM

## 2019-04-25 DIAGNOSIS — I48.91 ATRIAL FIBRILLATION, UNSPECIFIED TYPE (HCC): ICD-10-CM

## 2019-04-25 DIAGNOSIS — R06.02 SOB (SHORTNESS OF BREATH): Primary | ICD-10-CM

## 2019-04-25 PROCEDURE — 93000 ELECTROCARDIOGRAM COMPLETE: CPT | Performed by: PHYSICIAN ASSISTANT

## 2019-04-25 PROCEDURE — 99214 OFFICE O/P EST MOD 30 MIN: CPT | Performed by: PHYSICIAN ASSISTANT

## 2019-04-25 NOTE — PATIENT INSTRUCTIONS

## 2019-04-25 NOTE — PROGRESS NOTES
Problem list     Subjective   Nanci Pritchard is a 79 y.o. female     Chief Complaint   Patient presents with   • Hyperlipidemia   • Hypertension   • Palpitations   Problem List:  1. Sick sinus syndrome.  1.1. Greater than 3 second pause noted per event monitor.  1.2. The patient was subsequently admitted to [facility] with placement of dual-chamber pacemaker per Dr. Gutierrez.  1.3. Apparent lead dislodgment with subsequent fixation of the leads the following day post pacemaker placement.  2. Preserved systolic function  3. Low risk stress test, 09/2016.  4. Hypertension  5. Atrial Fibrillation  6. Fibromyalgia.    HPI  The patient presents in today for routine follow-up.  She has not done well as of recent.  She says started having more episodes of palpitations and tachycardia.  She is also started noticing chest tightness over the last several days.  She describes precordial tightness which occurs when stressed.  She has significant stress caring for her chronically ill .  The patient has stable dyspnea.  She has no PND orthopnea.  Her main concern today however seems to be with recurrent episodes of headaches.  She would like to see neurology.  Blood pressures are normal for the most part.  Labs are followed historically through her primary care provider.  She has not had that however in over 6 months by report.  She has no further complaints.    Current Outpatient Medications   Medication Sig Dispense Refill   • amLODIPine (NORVASC) 5 MG tablet Take 1 tablet by mouth daily. 90 tablet 3   • apixaban (ELIQUIS) 5 MG tablet tablet Take 1 tablet by mouth 2 (Two) Times a Day. 90 tablet 3   • aspirin 81 MG EC tablet Take 81 mg by mouth daily.     • DULoxetine (CYMBALTA) 60 MG capsule Take 60 mg by mouth Daily.     • Evolocumab with Infusor (REPATHA PUSHTRONEX SYSTEM) 420 MG/3.5ML solution cartridge Inject 1 Device under the skin into the appropriate area as directed Every 30 (Thirty) Days.  1 cartridge. 0   • furosemide (LASIX) 20 MG tablet Take 1 tablet daily for 3 pound weight gain (Patient taking differently: Daily As Needed. Take 1 tablet daily for 3 pound weight gain) 30 tablet 0   • glimepiride (AMARYL) 2 MG tablet Take 4 mg by mouth Daily.     • lisinopril (PRINIVIL,ZESTRIL) 20 MG tablet Take 20 mg by mouth Daily.     • metFORMIN (GLUCOPHAGE) 500 MG tablet Take 1,000 mg by mouth 2 (Two) Times a Day With Meals.     • metoprolol tartrate (LOPRESSOR) 50 MG tablet Take 1 tablet by mouth daily. 90 tablet 3   • omeprazole (priLOSEC) 40 MG capsule Take 40 mg by mouth Daily.     • potassium chloride (K-DUR) 10 MEQ CR tablet Take with Lasix 30 tablet 0     No current facility-administered medications for this visit.        Soma [carisoprodol]    Past Medical History:   Diagnosis Date   • Arrhythmia    • Fibromyalgia    • Histoplasmosis    • Hyperlipidemia    • Hypertension    • Osteoarthritis    • SSS (sick sinus syndrome) (CMS/[facility])        Social History     Socioeconomic History   • Marital status:      Spouse name: Not on file   • Number of children: Not on file   • Years of education: Not on file   • Highest education level: Not on file   Tobacco Use   • Smoking status: Never Smoker   • Smokeless tobacco: Never Used   Substance and Sexual Activity   • Alcohol use: No   • Drug use: No   • Sexual activity: Defer       Family History   Problem Relation Age of Onset   • Heart disease Mother    • Heart disease Father        Review of Systems   Constitutional: Positive for fatigue (fatigue every day).   HENT: Negative for congestion, sneezing and sore throat.    Eyes: Positive for visual disturbance (wears glasses daily).   Respiratory: Positive for shortness of breath (SOA when in a-fib). Negative for apnea, chest tightness and wheezing.    Cardiovascular: Positive for chest pain (CP recently at hs) and palpitations (palpitations daily).   Gastrointestinal: Negative.  Negative for abdominal pain,  "diarrhea, nausea and vomiting.   Endocrine: Negative.  Negative for cold intolerance and heat intolerance.   Genitourinary: Negative.  Negative for difficulty urinating, frequency and urgency.   Musculoskeletal: Negative.  Negative for arthralgias, back pain and neck pain.   Skin: Negative.  Negative for rash and wound.   Allergic/Immunologic: Negative.  Negative for environmental allergies and food allergies.   Neurological: Positive for dizziness (several episodes in 3 months) and headaches (daily h/s). Negative for syncope, weakness and light-headedness.   Hematological: Bruises/bleeds easily (bruises/blds easily).   Psychiatric/Behavioral: Negative for agitation, confusion and sleep disturbance (denies waking up smothering/soa). The patient is nervous/anxious.        Objective   Vitals:    04/25/19 1331   BP: 118/70   Pulse: 79   SpO2: 98%   Weight: 69.4 kg (153 lb)   Height: 167.6 cm (65.98\")      /70   Pulse 79   Ht 167.6 cm (65.98\")   Wt 69.4 kg (153 lb)   SpO2 98%   BMI 24.71 kg/m²    Lab Results (most recent)     None        Physical Exam   Constitutional: She is oriented to person, place, and time. She appears well-developed and well-nourished. No distress.   HENT:   Head: Normocephalic and atraumatic.   Eyes: Conjunctivae and EOM are normal. Pupils are equal, round, and reactive to light.   Neck: Normal range of motion. Neck supple. No JVD present. No tracheal deviation present.   Cardiovascular: Normal rate, regular rhythm, normal heart sounds and intact distal pulses.   Pulmonary/Chest: Effort normal and breath sounds normal.   Abdominal: Soft. Bowel sounds are normal. She exhibits no distension and no mass. There is no tenderness. There is no rebound and no guarding.   Musculoskeletal: Normal range of motion. She exhibits no edema, tenderness or deformity.   Neurological: She is alert and oriented to person, place, and time.   Skin: Skin is warm and dry. No rash noted. No erythema. No " "pallor.   Psychiatric: She has a normal mood and affect. Her behavior is normal. Judgment and thought content normal.   Nursing note and vitals reviewed.        Procedure     ECG 12 Lead  Date/Time: 4/25/2019 1:55 PM  Performed by: Edilson Souza PA  Authorized by: Edilson Souza PA   Comments: Sinus rhythm 72, normal axis, no acute changes noted.               Assessment/Plan      Diagnosis Plan   1. SOB (shortness of breath)  ECG 12 Lead    Stress Test With Myocardial Perfusion One Day    Basic Metabolic Panel    CBC & Differential    Lipid Panel    TSH   2. Nonintractable headache, unspecified chronicity pattern, unspecified headache type  Ambulatory Referral to Neurology    Stress Test With Myocardial Perfusion One Day    Basic Metabolic Panel    CBC & Differential    Lipid Panel    TSH   3. Atrial fibrillation, unspecified type (CMS/HCC)     4. Essential hypertension  Stress Test With Myocardial Perfusion One Day    Basic Metabolic Panel    CBC & Differential    Lipid Panel    TSH   5. Shortness of breath  Stress Test With Myocardial Perfusion One Day    Basic Metabolic Panel    CBC & Differential    Lipid Panel    TSH   6. Palpitations  Stress Test With Myocardial Perfusion One Day    Basic Metabolic Panel    CBC & Differential    Lipid Panel    TSH   7. Pacemaker  Stress Test With Myocardial Perfusion One Day    Basic Metabolic Panel    CBC & Differential    Lipid Panel    TSH       The patient has had increasing chest discomfort and shortness of air as of recent.  I would like to schedule for nuclear stress test for ischemia assessment.  She has refused consideration for an echo as with that study previously, with application of the probe to the chest wall, she reports that the procedure \"set off her fibro-\" through her chest    Also with increasing palpitations and history of A. fib, will schedule for pacemaker interrogation.  We can evaluate for any rhythm disturbance issues by reviewing histograms " at that time.  For now, I will continue medications for a fib with no changes indicated.    Also she needs repeat evaluation via laboratories.  I will schedule for routine lab evaluation.    For now, she will continue medications with no changes indicated.  She is very concerned that Eliquis has caused her recurrent headaches and issues otherwise.  She wants referral to neurology for evaluation of that.  We will refer to neurology if okay with primary care provider.  For now, she will continue Eliquis and medications otherwise.    We will see her back after all the above and recommend further at that time.  She will call for issues prior to follow-up                  Patient's Body mass index is 24.71 kg/m². BMI is within normal parameters. No follow-up required..             Electronically signed by:

## 2019-05-03 ENCOUNTER — OFFICE VISIT (OUTPATIENT)
Dept: CARDIOLOGY | Facility: CLINIC | Age: 79
End: 2019-05-03

## 2019-05-03 DIAGNOSIS — I49.5 SICK SINUS SYNDROME (HCC): Primary | ICD-10-CM

## 2019-05-03 PROCEDURE — 93288 INTERROG EVL PM/LDLS PM IP: CPT | Performed by: INTERNAL MEDICINE

## 2019-05-15 ENCOUNTER — OFFICE VISIT (OUTPATIENT)
Dept: CARDIOLOGY | Facility: CLINIC | Age: 79
End: 2019-05-15

## 2019-05-15 VITALS
HEIGHT: 66 IN | OXYGEN SATURATION: 97 % | WEIGHT: 154 LBS | HEART RATE: 74 BPM | BODY MASS INDEX: 24.75 KG/M2 | DIASTOLIC BLOOD PRESSURE: 79 MMHG | RESPIRATION RATE: 16 BRPM | SYSTOLIC BLOOD PRESSURE: 148 MMHG

## 2019-05-15 DIAGNOSIS — R06.02 SHORTNESS OF BREATH: ICD-10-CM

## 2019-05-15 DIAGNOSIS — Z95.0 PACEMAKER: ICD-10-CM

## 2019-05-15 DIAGNOSIS — I48.91 ATRIAL FIBRILLATION, UNSPECIFIED TYPE (HCC): Primary | ICD-10-CM

## 2019-05-15 PROCEDURE — 99213 OFFICE O/P EST LOW 20 MIN: CPT | Performed by: PHYSICIAN ASSISTANT

## 2019-05-15 RX ORDER — WARFARIN SODIUM 5 MG/1
5 TABLET ORAL
Qty: 60 TABLET | Refills: 11 | Status: SHIPPED | OUTPATIENT
Start: 2019-05-15 | End: 2019-09-16 | Stop reason: SDUPTHER

## 2019-05-15 NOTE — PATIENT INSTRUCTIONS
BMI for Adults  Body mass index (BMI) is a number that is calculated from a person's weight and height. In most adults, the number is used to find how much of an adult's weight is made up of fat. BMI is not as accurate as a direct measure of body fat.  How is BMI calculated?  BMI is calculated by dividing weight in kilograms by height in meters squared. It can also be calculated by dividing weight in pounds by height in inches squared, then multiplying the resulting number by 703. Charts are available to help you find your BMI quickly and easily without doing this calculation.  How is BMI interpreted?  Health care professionals use BMI charts to identify whether an adult is underweight, at a normal weight, or overweight based on the following guidelines:  · Underweight: BMI less than 18.5.  · Normal weight: BMI between 18.5 and 24.9.  · Overweight: BMI between 25 and 29.9.  · Obese: BMI of 30 and above.    BMI is usually interpreted the same for males and females.  Weight includes both fat and muscle, so someone with a muscular build, such as an athlete, may have a BMI that is higher than 24.9. In cases like these, BMI may not accurately depict body fat. To determine if excess body fat is the cause of a BMI of 25 or higher, further assessments may need to be done by a health care provider.  Why is BMI a useful tool?  BMI is used to identify a possible weight problem that may be related to a medical problem or may increase the risk for medical problems. BMI can also be used to promote changes to reach a healthy weight.  This information is not intended to replace advice given to you by your health care provider. Make sure you discuss any questions you have with your health care provider.  Document Released: 08/29/2005 Document Revised: 04/27/2017 Document Reviewed: 05/15/2015  [employer] Patient Education © 2018 [employer]  Fat and Cholesterol Restricted Eating Plan  Getting too much fat and cholesterol  "in your diet may cause health problems. Choosing the right foods helps keep your fat and cholesterol at normal levels. This can keep you from getting certain diseases.  Your doctor may recommend an eating plan that includes:  · Total fat: ______% or less of total calories a day.  · Saturated fat: ______% or less of total calories a day.  · Cholesterol: less than _________mg a day.  · Fiber: ______g a day.    What are tips for following this plan?  General tips  · Work with your doctor to lose weight if you need to.  · Avoid:  ? Foods with added sugar.  ? Fried foods.  ? Foods with partially hydrogenated oils.  · Limit alcohol intake to no more than 1 drink a day for nonpregnant women and 2 drinks a day for men. One drink equals 12 oz of beer, 5 oz of wine, or 1½ oz of hard liquor.  Reading food labels  · Check food labels for:  ? Trans fats.  ? Partially hydrogenated oils.  ? Saturated fat (g) in each serving.  ? Cholesterol (mg) in each serving.  ? Fiber (g) in each serving.  · Choose foods with healthy fats, such as:  ? Monounsaturated fats.  ? Polyunsaturated fats.  ? Omega-3 fats.  · Choose grain products that have whole grains. Look for the word \"whole\" as the first word in the ingredient list.  Cooking  · Cook foods using low-fat methods. These include baking, boiling, grilling, and broiling.  · Eat more home-cooked foods. Eat at restaurants and buffets less often.  · Avoid cooking using saturated fats, such as butter, cream, palm oil, palm kernel oil, and coconut oil.  Meal planning  · At meals, divide your plate into four equal parts:  ? Fill one-half of your plate with vegetables and green salads.  ? Fill one-fourth of your plate with whole grains.  ? Fill one-fourth of your plate with low-fat (lean) protein foods.  · Eat fish that is high in omega-3 fats at least two times a week. This includes mackerel, tuna, sardines, and salmon.  · Eat foods that are high in fiber, such as whole grains, beans, apples, " broccoli, carrots, peas, and barley.  Recommended foods  Grains  · Whole grains, such as whole wheat or whole grain breads, crackers, cereals, and pasta. Unsweetened oatmeal, bulgur, barley, quinoa, or brown rice. Corn or whole wheat flour tortillas.  Vegetables  · Fresh or frozen vegetables (raw, steamed, roasted, or grilled). Green salads.  Fruits  · All fresh, canned (in natural juice), or frozen fruits.  Meats and other protein foods  · Ground beef (85% or leaner), grass-fed beef, or beef trimmed of fat. Skinless chicken or turkey. Ground chicken or turkey. Pork trimmed of fat. All fish and seafood. Egg whites. Dried beans, peas, or lentils. Unsalted nuts or seeds. Unsalted canned beans. Nut butters without added sugar or oil.  Dairy  · Low-fat or nonfat dairy products, such as skim or 1% milk, 2% or reduced-fat cheeses, low-fat and fat-free ricotta or cottage cheese, or plain low-fat and nonfat yogurt.  Fats and oils  · Tub margarine without trans fats. Light or reduced-fat mayonnaise and salad dressings. Avocado. Olive, canola, sesame, or safflower oils.  The items listed above may not be a complete list of recommended foods or beverages. Contact your dietitian for more options.  Foods to avoid  Grains  · White bread. White pasta. White rice. Cornbread. Bagels, pastries, and croissants. Crackers and snack foods that contain trans fat and hydrogenated oils.  Vegetables  · Vegetables cooked in cheese, cream, or butter sauce. Fried vegetables.  Fruits  · Canned fruit in heavy syrup. Fruit in cream or butter sauce. Fried fruit.  Meats and other protein foods  · Fatty cuts of meat. Ribs, chicken wings, vasquez, sausage, bologna, salami, chitterlings, fatback, hot dogs, bratwurst, and packaged lunch meats. Liver and organ meats. Whole eggs and egg yolks. Chicken and turkey with skin. Fried meat.  Dairy  · Whole or 2% milk, cream, half-and-half, and cream cheese. Whole milk cheeses. Whole-fat or sweetened yogurt.  Full-fat cheeses. Nondairy creamers and whipped toppings. Processed cheese, cheese spreads, and cheese curds.  Beverages  · Alcohol. Sugar-sweetened drinks such as sodas, lemonade, and fruit drinks.  Fats and oils  · Butter, stick margarine, lard, shortening, ghee, or vasquez fat. Coconut, palm kernel, and palm oils.  Sweets and desserts  · Corn syrup, sugars, honey, and molasses. Candy. Jam and jelly. Syrup. Sweetened cereals. Cookies, pies, cakes, donuts, muffins, and ice cream.  The items listed above may not be a complete list of foods and beverages to avoid. Contact your dietitian for more information.  Summary  · Choosing the right foods helps keep your fat and cholesterol at normal levels. This can keep you from getting certain diseases.  · At meals, fill one-half of your plate with vegetables and green salads.  · Eat high-fiber foods, like whole grains, beans, apples, carrots, peas, and barley.  · Limit added sugar, saturated fats, alcohol, and fried foods.  This information is not intended to replace advice given to you by your health care provider. Make sure you discuss any questions you have with your health care provider.  Document Released: 06/18/2013 Document Revised: 09/04/2018 Document Reviewed: 09/04/2018  [employer] Interactive Patient Education © 2019 [employer]    Warfarin Coagulopathy  Warfarin coagulopathy refers to bleeding that may occur as a complication of the medicine warfarin. Warfarin is a blood thinner (anticoagulant). Anticoagulants prevent dangerous blood clots. Bleeding is the most common and most serious complication of warfarin.  While taking warfarin, you will need to have blood tests (prothrombin tests, or PT tests) regularly to measure your blood clotting time. The PT test results will be reported as the International Normalized Ratio (INR). The INR tells your health care provider whether your dosage of warfarin needs to be changed. The longer it takes your blood to clot, the  higher the INR. Your risk of warfarin coagulopathy increases as your INR increases.  What are the causes?  This condition may be caused by:  · Taking too much warfarin (overdose).  · Underlying medical conditions.  · Changes to your diet.  · Interactions with medicines, supplements, or alcohol.    What are the signs or symptoms?  Warfarin coagulopathy may cause bleeding from any tissue or organ. Symptoms may include:  · Bleeding from the gums.  · A nosebleed that is not easily stopped.  · Blood in stool. This may look like bright red, dark, or black, tarry stools.  · Blood in urine. This may look like pink, red, or brown urine.  · Unusual bruising or bruising easily.  · A cut that does not stop bleeding within 10 minutes.  · Coughing up blood.  · Vomiting blood.  · Feeling nauseous for longer than 1 day.  · Broken blood vessels in the eye (subconjunctival hemorrhage). This may look like a bright red or dark red patch on the white part of the eye.  · Abdominal or back pain with or without bruising.  · Sudden, severe headache.  · Sudden weakness or numbness of the face, arm, or leg, especially on one side of the body.  · Sudden confusion.  · Difficulty speaking (aphasia) or understanding speech.  · Sudden trouble seeing out of one or both eyes.  · Unexpected difficulty walking.  · Dizziness.  · Loss of balance or coordination.  · Unusual vaginal bleeding.  · Swelling or pain at an injection site.  · Skin scarring due to tissue death (necrosis) of fatty tissue. This may cause pain in the waist, thighs, or buttocks. This is more common among women.    How is this diagnosed?  This condition is diagnosed after your health care provider places you on warfarin and then finds out how it affects your blood's ability to clot. Prothrombin time (PT) clotting tests are used to monitor your clotting factor. These tests also help your health care provider to find the warfarin dose that is best for you.  How is this treated?  This  condition is treated with vitamin K. Vitamin K helps the blood to clot. You may receive vitamin K every 12 hours, or as needed. You may also receive donated plasma (transfusions of fresh frozen plasma). Plasma is the liquid part of blood, and contains substances that help the blood clot.  Follow these instructions at home:  Medicines  · Take warfarin exactly as told by your health care provider. This ensures that you avoid bleeding or clots that could result in serious injury, pain, or disability.  · Take your medicine at the same time every day. If you forget to take your dose of warfarin, take it as soon as you remember on that day. If you do not remember to take it on that day, do not take an extra dose the next day.  · Contact your health care provider if you miss a dose or take an extra dose. Do not change your dosage on your own to make up for missed or extra doses.  · Talk with your health care provider or your pharmacist before starting or stopping any new medicines. Many prescription and over-the-counter medicines can interfere with warfarin. This includes over-the-counter vitamins, dietary supplements, herbal medicines, and pain medicines. Your warfarin dosage may need to be adjusted.  Eating and drinking  · It is important to maintain a normal, balanced diet while taking warfarin. Avoid major changes in your diet. If you are planning to change your diet, talk with your health care provider before making changes.  · Your health care provider may recommend that you work with a diet and nutrition specialist (dietitian).  · Vitamin K makes warfarin less effective. It is found in many foods. Eat a consistent amount of foods that contain vitamin K. For example, you may decide to eat 2 vitamin K-containing foods each day. Your warfarin dose is set according to the amount of vitamin K in your blood.  · Eat a consistent amount of foods that contain vitamin K. Vitamin K makes warfarin less effective, so eating the  same amount each day enables your health care provider to set the correct dose of warfarin. You may decide to eat 2 vitamin K-containing foods each day.  Tests  · Make sure to have PT tests at least once every 4-6 weeks for the entire time you are taking warfarin.  · Ask your health care provider what your target INR range is. Make sure you always know your target range. If your INR is not in your target range, your health care provider may adjust your dosage.  Preventing bleeding and injury  · Some common over-the-counter medicines and supplements may increase the risk of bleeding while taking warfarin. They include:  ? Acetaminophen.  ? Aspirin.  ? NSAIDs, such as ibuprofen or naproxen.  ? Vitamin E.  · Avoid situations that cause bleeding. You may bleed more easily while taking warfarin. To limit bleeding, take the following actions:  ? Use a softer toothbrush.  ? Floss with waxed floss, not unwaxed floss.  ? Shave with an electric razor, not with a blade.  ? Limit your use of sharp objects.  ? Avoid potentially harmful activities, such as contact sports.  General instructions  · Wear or carry identification that says that you are taking warfarin.  · Make sure that all health care providers, including your dentist, know that you are taking warfarin.  · If you need surgery, tell your health care provider that you are taking warfarin. You may have to stop taking warfarin before your surgery.  · If you plan to breastfeed or become pregnant while taking warfarin, talk with your health care provider.  · Avoid alcohol, tobacco, and drugs.  ? If your health care provider approves, limit alcohol intake to no more than 1 drink a day for non-pregnant women and 2 drinks a day for men. One drink equals 12 oz. of beer, 5 oz. of wine, or 1½ oz. of hard liquor.  ? If you change the amount of nicotine, tobacco, or alcohol you use, tell your health care provider.  · Keep all follow-up visits and lab visits as told by your health  care provider. This is very important because warfarin is a medicine that needs to be closely monitored.  Contact a health care provider if:  · You miss a dose.  · You take an extra dose.  · You plan to have any kind of surgery or procedure.  · You are unable to take your medicine due to nausea, vomiting, or diarrhea.  · You have any major changes in your diet, or you plan to make major changes in your diet.  · You start or stop any over-the-counter medicine, prescription medicine, or dietary supplement.  · You become pregnant, plan to become pregnant, or think you may be pregnant.  · You have menstrual periods that are heavier than usual, or unusual vaginal bleeding.  · You have unusual bruising.  · You lose your appetite.  · You have a fever.  · You have diarrhea that lasts for more than 24 hours.  Get help right away if:  · You develop symptoms of an allergic reaction, such as:  ? Swelling of the lips, face, tongue, mouth, or throat.  ? Rash.  ? Itching.  ? Itchy, red, swollen areas of skin (hives).  ? Trouble breathing.  ? Chest tightness.  · You have any symptoms of stroke. BEFAST is an easy way to remember the main warning signs of stroke:  ? B - Balance. Signs are dizziness, sudden trouble walking, or loss of balance.  ? E - Eye. Signs are trouble seeing or a sudden change in vision.  ? F - Face. Signs are sudden weakness or numbness of the face, or the face or eyelid drooping on one side.  ? A - Arm. Signs are weakness or numbness in an arm. This happens suddenly and usually on one side of the body.  ? S - Speech. Signs are trouble speaking, slurred speech, or trouble understanding speech.  ? T - Time. Time to call emergency services. Write down the time your symptoms started.  · You have other signs of stroke, such as:  ? A sudden, severe headache with no known cause.  ? Nausea or vomiting.  ? Seizure.  · You have signs or symptoms of a blood clot, such as:  ? Pain or swelling in your leg or arm.  ? Skin  that is red or warm to the touch on your arm or leg.  ? Shortness of breath or difficulty breathing.  ? Chest pain.  ? Unexplained fever.  · You have:  ? A fall or have an accident, especially if you hit your head.  ? Blood in your urine. Your urine may look reddish, pinkish, or tea-colored.  ? Blood in your stool. Your stool may be black or bright red.  ? Bleeding that does not stop after applying pressure to the area for 30 minutes.  ? Severe pain in your joints or back.  ? Purple or blue toes.  ? Skin ulcers that do not go away.  · You vomit blood or cough up blood. The blood may be bright red, or it may look like coffee grounds.  These symptoms may represent a serious problem that is an emergency. Do not wait to see if the symptoms will go away. Get medical help right away. Call your local emergency services (911 in the [place] Do not drive yourself to the hospital.  Summary  · Warfarin needs to be closely monitored with blood tests. It is very important to keep all lab visits and follow-up visits with your health care provider. Make sure you know your target INR range and your warfarin dosage.  · Monitor how much vitamin K you eat every day. Try to eat the same amount every day.  · Wear or carry identification that says that you are taking warfarin.  · Take warfarin at the same time every day. Call your health care provider if you miss a dose or if you take an extra dose. Do not change the dosage of warfarin on your own.  · Know the signs and symptoms of blood clots, bleeding, and stroke. Know when to get emergency medical help.  This information is not intended to replace advice given to you by your health care provider. Make sure you discuss any questions you have with your health care provider.  Document Released: 11/26/2007 Document Revised: 03/07/2018 Document Reviewed: 03/07/2018  [employer] Interactive Patient Education © 2019 [employer]  Prothrombin Time, International Normalized Ratio Test  Why am I  having this test?  A prothrombin time (Pro-Time, PT) test measures how many seconds it takes your blood to clot. The international normalized ratio (INR) is a calculation of blood clotting time based on your PT result. Most labs report both PT and INR values when reporting blood clotting times. Your health care provider may want you to have this test done if:  · You have certain medical conditions that cause abnormal bleeding or blood clotting. These can include:  ? Liver disease.  ? Systemic infection (sepsis).  ? Inherited (genetic) bleeding disorders.  · You are taking a medicine to prevent excessive blood clotting (anticoagulant), such as warfarin.  ? If you are taking warfarin, you will likely be asked to have this test done at regular intervals. The results of this test will help your health care provider determine what dose of warfarin you need based on how quickly or slowly your blood clots. It is very important to have this test done as often as your health care provider recommends.    What kind of sample is taken?  A blood sample is required for this test. It is usually collected by inserting a needle into a vein or by sticking a finger with a small needle.  How do I prepare for this test?  There is no preparation required for this test.  What are the reference intervals?  Reference intervals are considered healthy intervals established after testing a large group of healthy people. Reference intervals may vary among different people, labs, and hospitals. It is your responsibility to obtain your test results. Ask the lab or department performing the test when and how you will get your results.  Reference intervals for this test are as follows:  · Without anticoagulant treatment (control value): 11.0-12.5 seconds; %.  · With full anticoagulant treatment: greater than 1.5-2 times the control value; 20-30%.  · INR: 0.8-1.1.    What do the results mean?  There are several factors that can alter your PT  and INR test results. It is important for you to know that:  · PT and INR results can be affected by certain foods you eat, especially foods that contain moderate or high amounts of vitamin K. It is important to eat a consistent amount of vitamin K-rich food. Let your health care provider know if you have recently changed your diet.  · PT and INR results can be affected by some medicines. Do not stop, add, or change any medicines without letting your health care provider know.    Talk with your health care provider to discuss your results, treatment options, and if necessary, the need for more tests. Talk with your health care provider if you have any questions about your results.  Talk with your health care provider to discuss your results, treatment options, and if necessary, the need for more tests. Talk with your health care provider if you have any questions about your results.  This information is not intended to replace advice given to you by your health care provider. Make sure you discuss any questions you have with your health care provider.  Document Released: 01/20/2006 Document Revised: 08/23/2017 Document Reviewed: 05/13/2015  [employer] Interactive Patient Education © 2019 [employer]

## 2019-05-15 NOTE — PROGRESS NOTES
Problem list     Subjective   Nanci Pritchard is a 79 y.o. female     Chief Complaint   Patient presents with   • Migraine     relates to Eliquis, states she quit taking Eliquis and headaches resolved   • Hyperlipidemia   • Hypertension   Problem List:  1. Sick sinus syndrome.  1.1. Greater than 3 second pause noted per event monitor.  1.2. The patient was subsequently admitted to [facility] with placement of dual-chamber pacemaker per Dr. Gutierrez.  1.3. Apparent lead dislodgment with subsequent fixation of the leads the following day post pacemaker placement.  2. Preserved systolic function  3. Low risk stress test, 09/2016.  4. Hypertension  5. Atrial Fibrillation  6. Fibromyalgia.    HPI  The patient presents in today for evaluation follow-up.  She cannot take Eliquis at this time.  She has had numerous side effects because of that.  She reports severe headaches when taking that therapy.  She has numerous other issues.  She wants to stop that and consider alternate therapy.  She did well on Xarelto but cannot afford that.  Pradaxa cannot be afforded either apparently per pharmacy.  She wants to discuss Coumadin therapy.  Otherwise, the patient has no symptoms of A. fib noted.  She has no chest pain.  She has stable dyspnea.  She has no PND orthopnea.  She has no further complaints otherwise.    Current Outpatient Medications   Medication Sig Dispense Refill   • amLODIPine (NORVASC) 5 MG tablet Take 1 tablet by mouth daily. 90 tablet 3   • aspirin 81 MG EC tablet Take 81 mg by mouth daily.     • Calcium Carbonate (CALTRATE 600) 1500 (600 Ca) MG tablet Take 600 mg by mouth Daily.     • DULoxetine (CYMBALTA) 60 MG capsule Take 60 mg by mouth Daily.     • Evolocumab with Infusor (REPATHA PUSHTRONEX SYSTEM) 420 MG/3.5ML solution cartridge Inject 1 Device under the skin into the appropriate area as directed Every 30 (Thirty) Days. 1 cartridge. 0   • furosemide (LASIX) 20 MG tablet Take 1 tablet  daily for 3 pound weight gain (Patient taking differently: Daily As Needed. Take 1 tablet daily for 3 pound weight gain) 30 tablet 0   • glimepiride (AMARYL) 2 MG tablet Take 4 mg by mouth Daily.     • lisinopril (PRINIVIL,ZESTRIL) 20 MG tablet Take 20 mg by mouth Daily.     • metFORMIN (GLUCOPHAGE) 500 MG tablet Take 1,000 mg by mouth 2 (Two) Times a Day With Meals.     • metoprolol tartrate (LOPRESSOR) 50 MG tablet Take 1 tablet by mouth daily. 90 tablet 3   • Multiple Vitamins-Minerals (EYE VITAMINS) capsule Take  by mouth.     • omeprazole (priLOSEC) 40 MG capsule Take 40 mg by mouth Daily.     • potassium chloride (K-DUR) 10 MEQ CR tablet Take with Lasix 30 tablet 0   • warfarin (COUMADIN) 5 MG tablet Take 1 tablet by mouth Daily. 60 tablet 11     No current facility-administered medications for this visit.        Eliquis [apixaban] and Soma [carisoprodol]    Past Medical History:   Diagnosis Date   • Arrhythmia    • Fibromyalgia    • Histoplasmosis    • Hyperlipidemia    • Hypertension    • Osteoarthritis    • SSS (sick sinus syndrome) (CMS/[facility])        Social History     Socioeconomic History   • Marital status:      Spouse name: Not on file   • Number of children: Not on file   • Years of education: Not on file   • Highest education level: Not on file   Tobacco Use   • Smoking status: Never Smoker   • Smokeless tobacco: Never Used   Substance and Sexual Activity   • Alcohol use: No   • Drug use: No   • Sexual activity: Defer       Family History   Problem Relation Age of Onset   • Heart disease Mother    • Heart disease Father        Review of Systems   Constitutional: Negative for activity change, appetite change and fatigue.   HENT: Positive for tinnitus. Negative for facial swelling and trouble swallowing.    Eyes: Negative for visual disturbance.   Respiratory: Negative for chest tightness and shortness of breath.    Cardiovascular: Positive for palpitations. Negative for chest pain.  "  Gastrointestinal: Negative for abdominal pain, blood in stool, constipation and diarrhea.   Endocrine: Negative for cold intolerance, heat intolerance and polyuria.   Genitourinary: Negative for dyspareunia, frequency and hematuria.   Musculoskeletal: Positive for arthralgias, joint swelling and myalgias.   Skin: Negative for color change and rash.   Allergic/Immunologic: Negative for environmental allergies and food allergies.   Neurological: Positive for light-headedness and headaches. Negative for dizziness and syncope.   Psychiatric/Behavioral: Negative for agitation, confusion, sleep disturbance and suicidal ideas. The patient is not nervous/anxious.        Objective   Vitals:    05/15/19 1601   BP: 148/79   BP Location: Right arm   Patient Position: Sitting   Pulse: 74   Resp: 16   SpO2: 97%   Weight: 69.9 kg (154 lb)   Height: 167.6 cm (65.98\")      /79 (BP Location: Right arm, Patient Position: Sitting)   Pulse 74   Resp 16   Ht 167.6 cm (65.98\")   Wt 69.9 kg (154 lb)   SpO2 97%   BMI 24.87 kg/m²    Lab Results (most recent)     None        Physical Exam   Constitutional: She is oriented to person, place, and time. She appears well-developed and well-nourished. No distress.   HENT:   Head: Normocephalic and atraumatic.   Eyes: Conjunctivae and EOM are normal. Pupils are equal, round, and reactive to light.   Neck: Normal range of motion. Neck supple. No JVD present. No tracheal deviation present.   Cardiovascular: Normal rate, regular rhythm, normal heart sounds and intact distal pulses.   Pulmonary/Chest: Effort normal and breath sounds normal.   Abdominal: Soft. Bowel sounds are normal. She exhibits no distension and no mass. There is no tenderness. There is no rebound and no guarding.   Musculoskeletal: Normal range of motion. She exhibits no edema, tenderness or deformity.   Neurological: She is alert and oriented to person, place, and time.   Skin: Skin is warm and dry. No rash noted. No " erythema. No pallor.   Psychiatric: She has a normal mood and affect. Her behavior is normal. Judgment and thought content normal.   Nursing note and vitals reviewed.        Procedure   Procedures       Assessment/Plan      Diagnosis Plan   1. Atrial fibrillation, unspecified type (CMS/HCC)     2. Shortness of breath  Protime-INR   3. Pacemaker         1.  The patient stable with regards to A. fib.  She reports no dysrhythmic symptoms.  Her issue at this time she cannot tolerate Eliquis because of numerous side effects.  She has discontinued that on her own.  I reminded her of slight stroke rebound risk.  She acknowledges that.  She would like to try Coumadin as she could not afford Xarelto or Pradaxa previously.  She will start Coumadin at 5 mg 1 tab daily.    2.  As she is starting Coumadin, we have given her information regarding Coumadin diet, Coumadin restrictions, etc.  She will need a standing order for PT/INRs.  We will follow that closely.  She will start her first PT/INR in 3 to 5 days after institution of Coumadin, which will start tonight.    3.  Otherwise, she appears stable.  She will continue routine interrogations as scheduled.  No further work-up otherwise.  We will continue her medical regimen outside of changes as above.  She will call to us immediately for any issues.  She would like Charleen in nursing to work closely with her with her PT/INRs.  I will try to arrange that.         Patient's Body mass index is 24.87 kg/m². BMI is within normal parameters. No follow-up required..             Electronically signed by:

## 2019-05-23 ENCOUNTER — APPOINTMENT (OUTPATIENT)
Dept: CARDIOLOGY | Facility: HOSPITAL | Age: 79
End: 2019-05-23

## 2019-05-23 ENCOUNTER — LAB (OUTPATIENT)
Dept: LAB | Facility: HOSPITAL | Age: 79
End: 2019-05-23

## 2019-05-23 ENCOUNTER — TELEPHONE (OUTPATIENT)
Dept: CARDIOLOGY | Facility: CLINIC | Age: 79
End: 2019-05-23

## 2019-05-23 ENCOUNTER — ANTICOAGULATION VISIT (OUTPATIENT)
Dept: CARDIOLOGY | Facility: CLINIC | Age: 79
End: 2019-05-23

## 2019-05-23 DIAGNOSIS — R06.02 SHORTNESS OF BREATH: ICD-10-CM

## 2019-05-23 LAB
INR PPP: 2.07 (ref 0.9–1.1)
INR PPP: 2.07 (ref 2–3)
PROTHROMBIN TIME: 24.3 SECONDS (ref 11–15.4)

## 2019-05-23 PROCEDURE — 85610 PROTHROMBIN TIME: CPT | Performed by: PHYSICIAN ASSISTANT

## 2019-05-23 PROCEDURE — 36415 COLL VENOUS BLD VENIPUNCTURE: CPT

## 2019-05-23 NOTE — TELEPHONE ENCOUNTER
PATIENT CALLED TO LET OFFICE KNOW SHE HAD HER INR TESTED THIS MORNING, SHE REQUESTED INFORMATION ABOUT FOODS TO AVOID AND FOODS SHE COULD EAT.  I MADE PATIENT AWARE OF COUMADIN DIET.  SHE WOULD LIKE A CALL FROM [facility] TO SEE IF SHE IS ON TRACK.  MILADYS SMITH

## 2019-05-23 NOTE — TELEPHONE ENCOUNTER
INR 2.07. NO DOSAGE CHANGE AND RECHECK LEVEL IN 1 WEEK PER SHELLI HENDRICKSON, PAC. VERBAL ORDERS READ BACK AND VERIFIED BY SHELLI HENDRICKSON PAC. PATIENT AWARE VERBALIZED OK. PH,LPN

## 2019-05-28 ENCOUNTER — HOSPITAL ENCOUNTER (OUTPATIENT)
Dept: CARDIOLOGY | Facility: HOSPITAL | Age: 79
Discharge: HOME | End: 2019-05-28

## 2019-05-28 DIAGNOSIS — Z95.0 PACEMAKER: ICD-10-CM

## 2019-05-28 DIAGNOSIS — R51.9 NONINTRACTABLE HEADACHE, UNSPECIFIED CHRONICITY PATTERN, UNSPECIFIED HEADACHE TYPE: ICD-10-CM

## 2019-05-28 DIAGNOSIS — R06.02 SOB (SHORTNESS OF BREATH): ICD-10-CM

## 2019-05-28 DIAGNOSIS — I10 ESSENTIAL HYPERTENSION: ICD-10-CM

## 2019-05-28 DIAGNOSIS — R00.2 PALPITATIONS: ICD-10-CM

## 2019-05-28 DIAGNOSIS — R06.02 SHORTNESS OF BREATH: ICD-10-CM

## 2019-05-28 LAB
BH CV STRESS COMMENTS STAGE 1: NORMAL
BH CV STRESS DOSE REGADENOSON STAGE 1: 0.4
BH CV STRESS DURATION MIN STAGE 1: 0
BH CV STRESS DURATION SEC STAGE 1: 10
BH CV STRESS PROTOCOL 1: NORMAL
BH CV STRESS RECOVERY BP: NORMAL MMHG
BH CV STRESS RECOVERY HR: 76 BPM
BH CV STRESS STAGE 1: 1
MAXIMAL PREDICTED HEART RATE: 143 BPM
PERCENT MAX PREDICTED HR: 53.85 %
STRESS BASELINE BP: NORMAL MMHG
STRESS BASELINE HR: 65 BPM
STRESS PERCENT HR: 63 %
STRESS POST PEAK BP: NORMAL MMHG
STRESS POST PEAK HR: 77 BPM
STRESS TARGET HR: 122 BPM

## 2019-05-28 PROCEDURE — 93017 CV STRESS TEST TRACING ONLY: CPT

## 2019-05-28 PROCEDURE — 25010000002 REGADENOSON 0.4 MG/5ML SOLUTION: Performed by: INTERNAL MEDICINE

## 2019-05-28 PROCEDURE — 0 TECHNETIUM SESTAMIBI: Performed by: INTERNAL MEDICINE

## 2019-05-28 PROCEDURE — A9500 TC99M SESTAMIBI: HCPCS | Performed by: INTERNAL MEDICINE

## 2019-05-28 PROCEDURE — 78452 HT MUSCLE IMAGE SPECT MULT: CPT

## 2019-05-28 PROCEDURE — 78452 HT MUSCLE IMAGE SPECT MULT: CPT | Performed by: INTERNAL MEDICINE

## 2019-05-28 PROCEDURE — 93018 CV STRESS TEST I&R ONLY: CPT | Performed by: INTERNAL MEDICINE

## 2019-05-28 RX ADMIN — REGADENOSON 0.4 MG: 0.08 INJECTION, SOLUTION INTRAVENOUS at 09:30

## 2019-05-28 RX ADMIN — TECHNETIUM TC 99M SESTAMIBI 1 DOSE: 1 INJECTION INTRAVENOUS at 09:30

## 2019-05-28 RX ADMIN — TECHNETIUM TC 99M SESTAMIBI 1 DOSE: 1 INJECTION INTRAVENOUS at 08:35

## 2019-05-30 ENCOUNTER — TELEPHONE (OUTPATIENT)
Dept: CARDIOLOGY | Facility: CLINIC | Age: 79
End: 2019-05-30

## 2019-05-30 ENCOUNTER — ANTICOAGULATION VISIT (OUTPATIENT)
Dept: CARDIOLOGY | Facility: CLINIC | Age: 79
End: 2019-05-30

## 2019-05-30 ENCOUNTER — LAB (OUTPATIENT)
Dept: LAB | Facility: HOSPITAL | Age: 79
End: 2019-05-30

## 2019-05-30 DIAGNOSIS — R06.02 SHORTNESS OF BREATH: ICD-10-CM

## 2019-05-30 LAB
INR PPP: 2.62 (ref 0.9–1.1)
INR PPP: 2.62 (ref 2–3)
PROTHROMBIN TIME: 29.2 SECONDS (ref 11–15.4)

## 2019-05-30 PROCEDURE — 36415 COLL VENOUS BLD VENIPUNCTURE: CPT

## 2019-05-30 PROCEDURE — 85610 PROTHROMBIN TIME: CPT | Performed by: PHYSICIAN ASSISTANT

## 2019-05-30 NOTE — PROGRESS NOTES
NO DOSAGE CHANGE AND RECHECK LEVEL IN 1 WEEK PER SHELLI HENDRICKSON, PAC. VERBAL ORDERS READ BACK AND VERIFIED BY SHELLI HENDRICKSON, PAC. PATIENT AWARE VERBALIZED OK. PH,LPN

## 2019-06-06 ENCOUNTER — LAB (OUTPATIENT)
Dept: LAB | Facility: HOSPITAL | Age: 79
End: 2019-06-06

## 2019-06-06 DIAGNOSIS — R06.02 SHORTNESS OF BREATH: ICD-10-CM

## 2019-06-06 LAB
INR PPP: 2.65 (ref 0.9–1.1)
PROTHROMBIN TIME: 29.5 SECONDS (ref 11–15.4)

## 2019-06-06 PROCEDURE — 36415 COLL VENOUS BLD VENIPUNCTURE: CPT

## 2019-06-06 PROCEDURE — 85610 PROTHROMBIN TIME: CPT | Performed by: PHYSICIAN ASSISTANT

## 2019-06-07 ENCOUNTER — TELEPHONE (OUTPATIENT)
Dept: CARDIOLOGY | Facility: CLINIC | Age: 79
End: 2019-06-07

## 2019-06-07 ENCOUNTER — ANTICOAGULATION VISIT (OUTPATIENT)
Dept: CARDIOLOGY | Facility: CLINIC | Age: 79
End: 2019-06-07

## 2019-06-07 LAB — INR PPP: 2.65 (ref 2–3)

## 2019-06-07 NOTE — TELEPHONE ENCOUNTER
NO DOSAGE CHANGE AND RECHECK LEVEL IN 2 WEEKS PER SHELLI HENDRICKSON, PAC. VERBAL ORDERS READ BACK AND VERIFIED BY SHELLI HENDRICKSON, PAC. PATIENT AWARE VERBALIZED OK. PH,LPN

## 2019-06-24 ENCOUNTER — LAB (OUTPATIENT)
Dept: LAB | Facility: HOSPITAL | Age: 79
End: 2019-06-24

## 2019-06-24 DIAGNOSIS — R06.02 SHORTNESS OF BREATH: ICD-10-CM

## 2019-06-24 LAB
INR PPP: 1.85 (ref 0.9–1.1)
PROTHROMBIN TIME: 22.2 SECONDS (ref 11–15.4)

## 2019-06-24 PROCEDURE — 36415 COLL VENOUS BLD VENIPUNCTURE: CPT

## 2019-06-24 PROCEDURE — 85610 PROTHROMBIN TIME: CPT | Performed by: PHYSICIAN ASSISTANT

## 2019-06-25 ENCOUNTER — TELEPHONE (OUTPATIENT)
Dept: CARDIOLOGY | Facility: CLINIC | Age: 79
End: 2019-06-25

## 2019-06-25 ENCOUNTER — ANTICOAGULATION VISIT (OUTPATIENT)
Dept: CARDIOLOGY | Facility: CLINIC | Age: 79
End: 2019-06-25

## 2019-06-25 LAB — INR PPP: 1.85 (ref 2–3)

## 2019-07-02 ENCOUNTER — LAB (OUTPATIENT)
Dept: LAB | Facility: HOSPITAL | Age: 79
End: 2019-07-02

## 2019-07-02 DIAGNOSIS — R06.02 SHORTNESS OF BREATH: ICD-10-CM

## 2019-07-02 LAB
INR PPP: 2.1 (ref 0.9–1.1)
PROTHROMBIN TIME: 24.6 SECONDS (ref 11–15.4)

## 2019-07-02 PROCEDURE — 85610 PROTHROMBIN TIME: CPT | Performed by: PHYSICIAN ASSISTANT

## 2019-07-02 PROCEDURE — 36415 COLL VENOUS BLD VENIPUNCTURE: CPT

## 2019-07-03 ENCOUNTER — TELEPHONE (OUTPATIENT)
Dept: CARDIOLOGY | Facility: CLINIC | Age: 79
End: 2019-07-03

## 2019-07-03 ENCOUNTER — ANTICOAGULATION VISIT (OUTPATIENT)
Dept: CARDIOLOGY | Facility: CLINIC | Age: 79
End: 2019-07-03

## 2019-07-03 LAB — INR PPP: 2.1 (ref 2–3)

## 2019-07-03 NOTE — TELEPHONE ENCOUNTER
NO DOSAGE CHANGE AND RECHECK LEVEL IN 2-3 WEEKS PER SHELLI HENDRICKSON, PAC. VERBAL ORDERS READ BACK AND VERIFIED BY SHELLI HENDRICKSON PAC. PATIENT AWARE VERBALIZED OK. PH,LPN

## 2019-07-22 ENCOUNTER — LAB (OUTPATIENT)
Dept: LAB | Facility: HOSPITAL | Age: 79
End: 2019-07-22

## 2019-07-22 DIAGNOSIS — R06.02 SHORTNESS OF BREATH: ICD-10-CM

## 2019-07-22 LAB
INR PPP: 2.44 (ref 0.9–1.1)
PROTHROMBIN TIME: 27.7 SECONDS (ref 11–15.4)

## 2019-07-22 PROCEDURE — 36415 COLL VENOUS BLD VENIPUNCTURE: CPT

## 2019-07-22 PROCEDURE — 85610 PROTHROMBIN TIME: CPT | Performed by: PHYSICIAN ASSISTANT

## 2019-07-23 ENCOUNTER — ANTICOAGULATION VISIT (OUTPATIENT)
Dept: CARDIOLOGY | Facility: CLINIC | Age: 79
End: 2019-07-23

## 2019-07-23 ENCOUNTER — TELEPHONE (OUTPATIENT)
Dept: CARDIOLOGY | Facility: CLINIC | Age: 79
End: 2019-07-23

## 2019-07-23 LAB — INR PPP: 2.44 (ref 2–3)

## 2019-07-23 NOTE — TELEPHONE ENCOUNTER
NO DOSAGE CHANGE AND RECHECK LEVEL IN 1 MO. PER SHELLI HENDRICKSON, PAC. VERBAL ORDERS READ BACK AND VERIFIED BY SHELLI HENDRICKSON, PAC. PATIENT AWARE VERBALIZED OK. PH,LPN

## 2019-07-25 ENCOUNTER — OFFICE VISIT (OUTPATIENT)
Dept: CARDIOLOGY | Facility: CLINIC | Age: 79
End: 2019-07-25

## 2019-07-25 VITALS
HEART RATE: 71 BPM | WEIGHT: 151.2 LBS | DIASTOLIC BLOOD PRESSURE: 74 MMHG | BODY MASS INDEX: 24.3 KG/M2 | SYSTOLIC BLOOD PRESSURE: 134 MMHG | HEIGHT: 66 IN | OXYGEN SATURATION: 95 %

## 2019-07-25 DIAGNOSIS — R94.39 ABNORMAL NUCLEAR STRESS TEST: ICD-10-CM

## 2019-07-25 DIAGNOSIS — R06.02 SHORTNESS OF BREATH: ICD-10-CM

## 2019-07-25 DIAGNOSIS — R07.2 PRECORDIAL PAIN: Primary | ICD-10-CM

## 2019-07-25 DIAGNOSIS — I10 ESSENTIAL HYPERTENSION: ICD-10-CM

## 2019-07-25 DIAGNOSIS — I48.0 PAROXYSMAL ATRIAL FIBRILLATION (HCC): ICD-10-CM

## 2019-07-25 PROCEDURE — 99214 OFFICE O/P EST MOD 30 MIN: CPT | Performed by: PHYSICIAN ASSISTANT

## 2019-07-25 NOTE — PROGRESS NOTES
Problem list     Subjective   Nanci Pritchard is a 79 y.o. female     Chief Complaint   Patient presents with   • Atrial Fibrillation   • Shortness of Breath   Problem List:  1. Sick sinus syndrome.  1.1. Greater than 3 second pause noted per event monitor.  1.2. The patient was subsequently admitted to [facility] with placement of dual-chamber pacemaker per Dr. Gutierrez.  1.3. Apparent lead dislodgment with subsequent fixation of the leads the following day post pacemaker placement.  2. Preserved systolic function  3. Low risk stress test, 09/2016.  4. Hypertension  5. Atrial Fibrillation  6. Fibromyalgia.    HPI  The patient presents in today for evaluation follow-up.  We wanted to bring her back to discuss stress test results.  She was scheduled for that because of significant shortness of air, chest tightness, and marked fatigue.  Stress test question anterolateral wall ischemia.  Post stress EF was preserved.  Currently, the patient is doing very poorly.  She still has severe fatigue.  She has limiting dyspnea.  She developed chest tightness with activity as well.  She tells me that with activities as minimal as just walking through the house, she must rest because of her limitation with fatigue, shortness of air, and chest discomfort.  She has no neck, arm, or jaw discomfort at this time outside of typical complaints with fibromyalgia.  She has no failure dysrhythmic symptoms of any significance.  She has no further complaints otherwise.    Current Outpatient Medications   Medication Sig Dispense Refill   • amLODIPine (NORVASC) 5 MG tablet Take 1 tablet by mouth daily. 90 tablet 3   • aspirin 81 MG EC tablet Take 81 mg by mouth daily.     • Calcium Carbonate (CALTRATE 600) 1500 (600 Ca) MG tablet Take 600 mg by mouth Daily.     • DULoxetine (CYMBALTA) 60 MG capsule Take 60 mg by mouth Daily.     • Evolocumab with Infusor (REPATHA PUSHTRONEX SYSTEM) 420 MG/3.5ML solution cartridge Inject  1 Device under the skin into the appropriate area as directed Every 30 (Thirty) Days. 1 cartridge. 0   • furosemide (LASIX) 20 MG tablet Take 1 tablet daily for 3 pound weight gain (Patient taking differently: Daily As Needed. Take 1 tablet daily for 3 pound weight gain) 30 tablet 0   • glimepiride (AMARYL) 2 MG tablet Take 4 mg by mouth Daily.     • lisinopril (PRINIVIL,ZESTRIL) 20 MG tablet Take 20 mg by mouth Daily.     • metFORMIN (GLUCOPHAGE) 500 MG tablet Take 1,000 mg by mouth 2 (Two) Times a Day With Meals.     • metoprolol tartrate (LOPRESSOR) 50 MG tablet Take 1 tablet by mouth daily. 90 tablet 3   • Multiple Vitamins-Minerals (EYE VITAMINS) capsule Take  by mouth.     • omeprazole (priLOSEC) 40 MG capsule Take 40 mg by mouth Daily.     • potassium chloride (K-DUR) 10 MEQ CR tablet Take with Lasix 30 tablet 0   • warfarin (COUMADIN) 5 MG tablet Take 1 tablet by mouth Daily. 60 tablet 11     No current facility-administered medications for this visit.        Eliquis [apixaban] and Soma [carisoprodol]    Past Medical History:   Diagnosis Date   • Arrhythmia    • Atrial fibrillation (CMS/HCC)    • Fibromyalgia    • Histoplasmosis    • Hyperlipidemia    • Hypertension    • Osteoarthritis    • SSS (sick sinus syndrome) (CMS/HCC)        Social History     Socioeconomic History   • Marital status:      Spouse name: Not on file   • Number of children: Not on file   • Years of education: Not on file   • Highest education level: Not on file   Tobacco Use   • Smoking status: Never Smoker   • Smokeless tobacco: Never Used   Substance and Sexual Activity   • Alcohol use: No   • Drug use: No   • Sexual activity: Defer       Family History   Problem Relation Age of Onset   • Heart disease Mother    • Heart disease Father        Review of Systems   Constitutional: Positive for fatigue (easily fatigued).   HENT: Negative.  Negative for congestion, rhinorrhea and sneezing.    Eyes: Positive for visual disturbance  "(wears glasses PRN).   Respiratory: Positive for shortness of breath. Negative for cough, chest tightness and wheezing.    Cardiovascular: Positive for palpitations (occasional palpitations). Negative for chest pain and leg swelling.   Gastrointestinal: Negative.  Negative for abdominal pain, nausea and vomiting.   Endocrine: Negative.  Negative for cold intolerance and heat intolerance.   Genitourinary: Negative.  Negative for difficulty urinating, frequency and urgency.   Musculoskeletal: Positive for arthralgias (joints). Negative for back pain, neck pain and neck stiffness.   Skin: Negative.  Negative for rash and wound.   Allergic/Immunologic: Negative.  Negative for environmental allergies and food allergies.   Neurological: Negative.  Negative for dizziness, syncope, weakness, light-headedness and headaches.   Hematological: Bruises/bleeds easily (bruises and bleeds easily).   Psychiatric/Behavioral: Negative for agitation, confusion and sleep disturbance (denies waking up smothering/SOA). The patient is nervous/anxious (easily nervous/axnious).        Objective   Vitals:    07/25/19 1123   BP: 134/74   BP Location: Left arm   Patient Position: Sitting   Pulse: 71   SpO2: 95%   Weight: 68.6 kg (151 lb 3.2 oz)   Height: 167.6 cm (66\")      /74 (BP Location: Left arm, Patient Position: Sitting)   Pulse 71   Ht 167.6 cm (66\")   Wt 68.6 kg (151 lb 3.2 oz)   SpO2 95%   BMI 24.40 kg/m²    Lab Results (most recent)     None        Physical Exam   Constitutional: She is oriented to person, place, and time. She appears well-developed and well-nourished. No distress.   HENT:   Head: Normocephalic and atraumatic.   Eyes: Conjunctivae and EOM are normal. Pupils are equal, round, and reactive to light.   Neck: Normal range of motion. Neck supple. No JVD present. No tracheal deviation present.   Cardiovascular: Normal rate, regular rhythm, normal heart sounds and intact distal pulses.   Pulmonary/Chest: Effort " normal and breath sounds normal.   Abdominal: Soft. Bowel sounds are normal. She exhibits no distension and no mass. There is no tenderness. There is no rebound and no guarding.   Musculoskeletal: Normal range of motion. She exhibits no edema, tenderness or deformity.   Neurological: She is alert and oriented to person, place, and time.   Skin: Skin is warm and dry. No rash noted. No erythema. No pallor.   Psychiatric: She has a normal mood and affect. Her behavior is normal. Judgment and thought content normal.   Nursing note and vitals reviewed.        Procedure   Procedures       Assessment/Plan      Diagnosis Plan   1. Precordial pain  [facility]    Basic Metabolic Panel    CBC & Differential    TSH    Comprehensive Metabolic Panel   2. Shortness of breath  [facility]    Basic Metabolic Panel    CBC & Differential    TSH    Comprehensive Metabolic Panel   3. Essential hypertension  [facility]    Basic Metabolic Panel    CBC & Differential    TSH    Comprehensive Metabolic Panel   4. Paroxysmal atrial fibrillation (CMS/HCC)  [facility]    Basic Metabolic Panel    CBC & Differential    TSH    Comprehensive Metabolic Panel   5. Abnormal nuclear stress test  [facility]    Basic Metabolic Panel    CBC & Differential    TSH    Comprehensive Metabolic Panel     1.  The patient has an abnormal stress test with a small segment of anterolateral wall ischemia.  Symptoms of chest discomfort, shortness of air, and fatigue have become significant for her.  I am concerned that these represent anginal equivalent symptoms.    2.  In that setting, we will schedule her for catheterization to further define coronary anatomy.    3.  I would continue medications for now.  We will continue aspirin therapy.  She will hold warfarin for 4 to 5 days before the procedure but again we will continue the aspirin therapy.  She will continue her injectable cholesterol medications without  change.    4.  I would like to schedule for routine labs in the precath setting.    5.  Once we know results of catheterization we can recommend her further at that time.  She will call to us for any issues prior to follow-up her prior to catheterization otherwise.            Patient's Body mass index is 24.4 kg/m². BMI is within normal parameters. No follow-up required..             Electronically signed by:

## 2019-07-25 NOTE — PATIENT INSTRUCTIONS
Coronary Angiogram With Stent  Coronary angiogram with stent placement is a procedure to widen or open a narrow blood vessel of the heart (coronary artery). Arteries may become blocked by cholesterol buildup (plaques) in the lining of the wall. When a coronary artery becomes partially blocked, blood flow to that area decreases. This may lead to chest pain or a heart attack (myocardial infarction).  A stent is a small piece of metal that looks like mesh or a spring. Stent placement may be done as treatment for a heart attack or right after a coronary angiogram in which a blocked artery is found.  Let your health care provider know about:  · Any allergies you have.  · All medicines you are taking, including vitamins, herbs, eye drops, creams, and over-the-counter medicines.  · Any problems you or family members have had with anesthetic medicines.  · Any blood disorders you have.  · Any surgeries you have had.  · Any medical conditions you have.  · Whether you are pregnant or may be pregnant.  What are the risks?  Generally, this is a safe procedure. However, problems may occur, including:  · Damage to the heart or its blood vessels.  · A return of blockage.  · Bleeding, infection, or bruising at the insertion site.  · A collection of blood under the skin (hematoma) at the insertion site.  · A blood clot in another part of the body.  · Kidney injury.  · Allergic reaction to the dye or contrast that is used.  · Bleeding into the abdomen (retroperitoneal bleeding).    What happens before the procedure?  Staying hydrated  Follow instructions from your health care provider about hydration, which may include:  · Up to 2 hours before the procedure - you may continue to drink clear liquids, such as water, clear fruit juice, black coffee, and plain tea.    Eating and drinking restrictions  Follow instructions from your health care provider about eating and drinking, which may include:  · 8 hours before the procedure - stop  eating heavy meals or foods such as meat, fried foods, or fatty foods.  · 6 hours before the procedure - stop eating light meals or foods, such as toast or cereal.  · 2 hours before the procedure - stop drinking clear liquids.    Ask your health care provider about:  · Changing or stopping your regular medicines. This is especially important if you are taking diabetes medicines or blood thinners.  · Taking medicines such as ibuprofen. These medicines can thin your blood. Do not take these medicines before your procedure if your health care provider instructs you not to. Generally, aspirin is recommended before a procedure of passing a small, thin tube (catheter) through a blood vessel and into the heart (cardiac catheterization).    What happens during the procedure?  · An IV tube will be inserted into one of your veins.  · You will be given one or more of the following:  ? A medicine to help you relax (sedative).  ? A medicine to numb the area where the catheter will be inserted into an artery (local anesthetic).  · To reduce your risk of infection:  ? Your health care team will wash or sanitize their hands.  ? Your skin will be washed with soap.  ? Hair may be removed from the area where the catheter will be inserted.  · Using a guide wire, the catheter will be inserted into an artery. The location may be in your groin, in your wrist, or in the fold of your arm (near your elbow).  · A type of X-ray (fluoroscopy) will be used to help guide the catheter to the opening of the arteries in the heart.  · A dye will be injected into the catheter, and X-rays will be taken. The dye will help to show where any narrowing or blockages are located in the arteries.  · A tiny wire will be guided to the blocked spot, and a balloon will be inflated to make the artery wider.  · The stent will be expanded and will crush the plaques into the wall of the vessel. The stent will hold the area open and improve the blood flow. Most stents  have a drug coating to reduce the risk of the stent narrowing over time.  · The artery may be made wider using a drill, laser, or other tools to remove plaques.  · When the blood flow is better, the catheter will be removed. The lining of the artery will grow over the stent, which stays where it was placed.  This procedure may vary among health care providers and hospitals.  What happens after the procedure?  · If the procedure is done through the leg, you will be kept in bed lying flat for about 6 hours. You will be instructed to not bend and not cross your legs.  · The insertion site will be checked frequently.  · The pulse in your foot or wrist will be checked frequently.  · You may have additional blood tests, X-rays, and a test that records the electrical activity of your heart (electrocardiogram, or ECG).  This information is not intended to replace advice given to you by your health care provider. Make sure you discuss any questions you have with your health care provider.  Document Released: 06/23/2004 Document Revised: 08/17/2017 Document Reviewed: 07/23/2017  [employer] Interactive Patient Education © 2019 [employer]

## 2019-07-30 ENCOUNTER — TELEPHONE (OUTPATIENT)
Dept: CARDIOLOGY | Facility: CLINIC | Age: 79
End: 2019-07-30

## 2019-07-30 NOTE — TELEPHONE ENCOUNTER
Called patient and notified her of new [facility] date and time for 08/28/2019 @ 1300 PM. Patient notified to be there at 1100 AM, same directions as before. Patient verbalized understanding and had no further questions at this time. -KAT;MACARIO

## 2019-08-05 ENCOUNTER — TELEPHONE (OUTPATIENT)
Dept: CARDIOLOGY | Facility: CLINIC | Age: 79
End: 2019-08-05

## 2019-08-05 NOTE — TELEPHONE ENCOUNTER
"CALLED  REGARDING INR AND WIFE STATES SINCE Friday WHEN SHE IS UP AND WALKING  HER BODY \"WILL LAUNCH TO THE LEFT\". STATES LOSING HER BALANCE, ABOUT TO FALL. QUESTIONED HER SPECIFICALLY ABOUT ANY STROKE LIKE SX'S AND SHE DENIED HAVING ANY AT ALL. SHE STATES HEART RATE ALL OVER THE PLACE, ONE TIME UP  THEN INTO 60'S, BUT B/P RUNNING GOOD.  TOLD HER I WOULD DISCUSS WITH NILESH PAINTING AND CALL HER BACK IF HE RECOMMENDED ANYTHING OR ANY NEW ORDERS. VERBALIZED OK. DISCUSSED ABOVE WITH SHELLI AND NO FURTHER EVAL. NEEDED. PH,LPN  "

## 2019-08-12 ENCOUNTER — TELEPHONE (OUTPATIENT)
Dept: CARDIOLOGY | Facility: CLINIC | Age: 79
End: 2019-08-12

## 2019-08-12 NOTE — TELEPHONE ENCOUNTER
Returned patients call , patient got her instructions that were given to her while in office. Verbally went over instructions with patient again in phone. Patient stated she new she was told Metformin and Coumadin to HOLD , however she was needing to know about Plavix. Patient was notified to not worry about Plavix as she was not on this medication and that she may take ASA day of heart cath. Patient verbalized understanding and had no further questions at this time. Patient was notified to call our office if she has any questions. -;[facility]    ----- Message from Charleen Jorgensen LPN sent at 8/12/2019  3:17 PM EDT -----  Contact: [phone]  Nanci states on her pre-cath instructions that her coumadin is not addressed. States is does not say anything about holding it. Please advise.

## 2019-08-13 ENCOUNTER — LAB (OUTPATIENT)
Dept: LAB | Facility: HOSPITAL | Age: 79
End: 2019-08-13

## 2019-08-13 DIAGNOSIS — I10 ESSENTIAL HYPERTENSION: ICD-10-CM

## 2019-08-13 DIAGNOSIS — R07.2 PRECORDIAL PAIN: ICD-10-CM

## 2019-08-13 DIAGNOSIS — R06.02 SHORTNESS OF BREATH: ICD-10-CM

## 2019-08-13 DIAGNOSIS — R94.39 ABNORMAL NUCLEAR STRESS TEST: ICD-10-CM

## 2019-08-13 DIAGNOSIS — I48.0 PAROXYSMAL ATRIAL FIBRILLATION (HCC): ICD-10-CM

## 2019-08-13 LAB
ALBUMIN SERPL-MCNC: 4.02 G/DL (ref 3.5–5.2)
ALBUMIN/GLOB SERPL: 1.4 G/DL
ALP SERPL-CCNC: 103 U/L (ref 39–117)
ALT SERPL W P-5'-P-CCNC: 55 U/L (ref 1–33)
ANION GAP SERPL CALCULATED.3IONS-SCNC: 18.6 MMOL/L (ref 5–15)
AST SERPL-CCNC: 41 U/L (ref 1–32)
BASOPHILS # BLD AUTO: 0.03 10*3/MM3 (ref 0–0.2)
BASOPHILS NFR BLD AUTO: 0.6 % (ref 0–1.5)
BILIRUB SERPL-MCNC: <0.2 MG/DL (ref 0.2–1.2)
BUN BLD-MCNC: 11 MG/DL (ref 8–23)
BUN/CREAT SERPL: 17.7 (ref 7–25)
CALCIUM SPEC-SCNC: 9.2 MG/DL (ref 8.6–10.5)
CHLORIDE SERPL-SCNC: 102 MMOL/L (ref 98–107)
CO2 SERPL-SCNC: 21.4 MMOL/L (ref 22–29)
CREAT BLD-MCNC: 0.62 MG/DL (ref 0.57–1)
DEPRECATED RDW RBC AUTO: 52.8 FL (ref 37–54)
EOSINOPHIL # BLD AUTO: 0.18 10*3/MM3 (ref 0–0.4)
EOSINOPHIL NFR BLD AUTO: 3.9 % (ref 0.3–6.2)
ERYTHROCYTE [DISTWIDTH] IN BLOOD BY AUTOMATED COUNT: 15.4 % (ref 12.3–15.4)
GFR SERPL CREATININE-BSD FRML MDRD: 93 ML/MIN/1.73
GLOBULIN UR ELPH-MCNC: 2.8 GM/DL
GLUCOSE BLD-MCNC: 149 MG/DL (ref 65–99)
HCT VFR BLD AUTO: 38.1 % (ref 34–46.6)
HGB BLD-MCNC: 11.8 G/DL (ref 12–15.9)
IMM GRANULOCYTES # BLD AUTO: 0.03 10*3/MM3 (ref 0–0.05)
IMM GRANULOCYTES NFR BLD AUTO: 0.6 % (ref 0–0.5)
LYMPHOCYTES # BLD AUTO: 1.94 10*3/MM3 (ref 0.7–3.1)
LYMPHOCYTES NFR BLD AUTO: 41.7 % (ref 19.6–45.3)
MCH RBC QN AUTO: 30.6 PG (ref 26.6–33)
MCHC RBC AUTO-ENTMCNC: 31 G/DL (ref 31.5–35.7)
MCV RBC AUTO: 98.7 FL (ref 79–97)
MONOCYTES # BLD AUTO: 0.61 10*3/MM3 (ref 0.1–0.9)
MONOCYTES NFR BLD AUTO: 13.1 % (ref 5–12)
NEUTROPHILS # BLD AUTO: 1.86 10*3/MM3 (ref 1.7–7)
NEUTROPHILS NFR BLD AUTO: 40.1 % (ref 42.7–76)
PLATELET # BLD AUTO: 284 10*3/MM3 (ref 140–450)
PMV BLD AUTO: 10.7 FL (ref 6–12)
POTASSIUM BLD-SCNC: 3.9 MMOL/L (ref 3.5–5.2)
PROT SERPL-MCNC: 6.8 G/DL (ref 6–8.5)
RBC # BLD AUTO: 3.86 10*6/MM3 (ref 3.77–5.28)
SODIUM BLD-SCNC: 142 MMOL/L (ref 136–145)
TSH SERPL DL<=0.05 MIU/L-ACNC: 3.42 MIU/ML (ref 0.27–4.2)
WBC NRBC COR # BLD: 4.65 10*3/MM3 (ref 3.4–10.8)

## 2019-08-13 PROCEDURE — 84443 ASSAY THYROID STIM HORMONE: CPT | Performed by: PHYSICIAN ASSISTANT

## 2019-08-13 PROCEDURE — 80053 COMPREHEN METABOLIC PANEL: CPT | Performed by: PHYSICIAN ASSISTANT

## 2019-08-13 PROCEDURE — 36415 COLL VENOUS BLD VENIPUNCTURE: CPT

## 2019-08-13 PROCEDURE — 85025 COMPLETE CBC W/AUTO DIFF WBC: CPT | Performed by: PHYSICIAN ASSISTANT

## 2019-08-21 ENCOUNTER — TELEPHONE (OUTPATIENT)
Dept: CARDIOLOGY | Facility: CLINIC | Age: 79
End: 2019-08-21

## 2019-08-22 ENCOUNTER — LAB (OUTPATIENT)
Dept: LAB | Facility: HOSPITAL | Age: 79
End: 2019-08-22

## 2019-08-22 DIAGNOSIS — R06.02 SHORTNESS OF BREATH: ICD-10-CM

## 2019-08-22 LAB
INR PPP: 2.56 (ref 0.9–1.1)
PROTHROMBIN TIME: 28.8 SECONDS (ref 11–15.4)

## 2019-08-22 PROCEDURE — 36415 COLL VENOUS BLD VENIPUNCTURE: CPT

## 2019-08-22 PROCEDURE — 85610 PROTHROMBIN TIME: CPT | Performed by: PHYSICIAN ASSISTANT

## 2019-08-23 ENCOUNTER — TELEPHONE (OUTPATIENT)
Dept: CARDIOLOGY | Facility: CLINIC | Age: 79
End: 2019-08-23

## 2019-08-23 ENCOUNTER — ANTICOAGULATION VISIT (OUTPATIENT)
Dept: CARDIOLOGY | Facility: CLINIC | Age: 79
End: 2019-08-23

## 2019-08-23 LAB — INR PPP: 2.56 (ref 2–3)

## 2019-08-28 ENCOUNTER — TELEPHONE (OUTPATIENT)
Dept: CARDIOLOGY | Facility: CLINIC | Age: 79
End: 2019-08-28

## 2019-08-28 ENCOUNTER — OUTSIDE FACILITY SERVICE (OUTPATIENT)
Dept: CARDIOLOGY | Facility: CLINIC | Age: 79
End: 2019-08-28

## 2019-08-28 PROCEDURE — 93458 L HRT ARTERY/VENTRICLE ANGIO: CPT | Performed by: INTERNAL MEDICINE

## 2019-08-28 PROCEDURE — 92979 ENDOLUMINL IVUS OCT C EA: CPT | Performed by: INTERNAL MEDICINE

## 2019-08-28 PROCEDURE — 92928 PRQ TCAT PLMT NTRAC ST 1 LES: CPT | Performed by: INTERNAL MEDICINE

## 2019-08-28 PROCEDURE — 92978 ENDOLUMINL IVUS OCT C 1ST: CPT | Performed by: INTERNAL MEDICINE

## 2019-08-28 RX ORDER — CLOPIDOGREL BISULFATE 75 MG/1
75 TABLET ORAL DAILY
Qty: 30 TABLET | Refills: 11 | Status: SHIPPED | OUTPATIENT
Start: 2019-08-28 | End: 2019-09-16 | Stop reason: SDUPTHER

## 2019-08-28 NOTE — PROGRESS NOTES
Verbal order per Dr Lopez s/p [facility] with stents for Plavix 75 mg PO daily. RX sent to [facility] in [place] with 11 refills as requested. Estefania Pierson MA

## 2019-08-28 NOTE — TELEPHONE ENCOUNTER
Patient scheduled for [facility] today and held metformin 24 hours prior she is concerned her glucose is 213 this morning.  Per Edilson Souza PA-C made patient aware if she needs insulin during pre-op at [facility] they will administer before cath.  MILADYS SMITH

## 2019-09-16 RX ORDER — WARFARIN SODIUM 5 MG/1
5 TABLET ORAL
Qty: 90 TABLET | Refills: 0 | Status: SHIPPED | OUTPATIENT
Start: 2019-09-16 | End: 2019-12-11 | Stop reason: SDUPTHER

## 2019-09-16 RX ORDER — CLOPIDOGREL BISULFATE 75 MG/1
75 TABLET ORAL DAILY
Qty: 90 TABLET | Refills: 3 | Status: SHIPPED | OUTPATIENT
Start: 2019-09-16 | End: 2020-06-30 | Stop reason: SDUPTHER

## 2019-09-16 NOTE — TELEPHONE ENCOUNTER
Fax from [facility] requesting 90 day supply of Plavix and Coumadin.  Will do Plavix with refills, but just #90 on Coumadin to ensure patient is compliant with INR's.  , KOBIA

## 2019-09-20 ENCOUNTER — OFFICE VISIT (OUTPATIENT)
Dept: CARDIOLOGY | Facility: CLINIC | Age: 79
End: 2019-09-20

## 2019-09-20 VITALS
BODY MASS INDEX: 25.81 KG/M2 | WEIGHT: 160.6 LBS | HEIGHT: 66 IN | DIASTOLIC BLOOD PRESSURE: 80 MMHG | SYSTOLIC BLOOD PRESSURE: 136 MMHG | HEART RATE: 72 BPM | OXYGEN SATURATION: 96 %

## 2019-09-20 DIAGNOSIS — I25.84 CORONARY ARTERY DISEASE DUE TO CALCIFIED CORONARY LESION: ICD-10-CM

## 2019-09-20 DIAGNOSIS — Z98.890 STATUS POST LEFT HEART CATHETERIZATION: Primary | ICD-10-CM

## 2019-09-20 DIAGNOSIS — I25.10 CORONARY ARTERY DISEASE DUE TO CALCIFIED CORONARY LESION: ICD-10-CM

## 2019-09-20 DIAGNOSIS — E78.2 MIXED HYPERLIPIDEMIA: ICD-10-CM

## 2019-09-20 DIAGNOSIS — I48.91 ATRIAL FIBRILLATION, UNSPECIFIED TYPE (HCC): ICD-10-CM
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
## 2019-09-20 DIAGNOSIS — E78.2 MIXED HYPERLIPIDEMIA: Primary | ICD-10-CM

## 2019-09-20 PROCEDURE — 99213 OFFICE O/P EST LOW 20 MIN: CPT | Performed by: NURSE PRACTITIONER

## 2019-09-20 RX ORDER — DIAPER,BRIEF,INFANT-TODD,DISP
EACH MISCELLANEOUS EVERY 6 HOURS SCHEDULED
Status: SHIPPED | OUTPATIENT
Start: 2019-09-20 | End: 2019-09-22

## 2019-09-20 NOTE — PROGRESS NOTES
Subjective   Nanci Pritchard is a 79 y.o. female     Chief Complaint   Patient presents with   • Hyperlipidemia     Here for [facility] f/u   • Palpitations   • Coronary Artery Disease   • Edema       HPI  Problem List:  1. Sick sinus syndrome.  1.1. Greater than 3 second pause noted per event monitor.  1.2. The patient was subsequently admitted to [facility] with placement of dual-chamber pacemaker per Dr. Gutierrez.  1.3. Apparent lead dislodgment with subsequent fixation of the leads the following day post pacemaker placement.  2. Preserved systolic function  3. Chest Discomfort  3.1 Low risk stress test, 09/2016.  3.2 Stress 7/2019 possible anterolateral wall ischemia  3.3 [facility] 8/2019 stenting of LAD and RCA  4. Hypertension  5. Atrial Fibrillation  6. Fibromyalgia    The patient is a 79-year-old female who presents today with her family at her side for follow-up status post left heart cath with Dr. Ross Lopez on August 28, 2019 at [facility].  The right radial artery was accessed for the procedure and the patient underwent successful stenting of both the left anterior descending artery and the right coronary artery.  Dual antiplatelet therapy and high-dose statin were recommended and the patient was scheduled to follow-up with both her primary care physician and in our office for medication management and wound assessment.    Right radial cath site clean dry intact without evidence of bruising, hematoma, redness, or drainage.  The patient denies any chest pain or pressure.  The patient states that she does get short of breath with activity.  She states that she has noticed some swelling in her bilateral upper and bilateral lower extremities to which she used Lasix yesterday.  Patient states that she does have occasional palpitations or fluttering that she feels more often when she is moving around.  Patient denies any PND or orthopnea.  Patient denies any dizziness,  presyncope, or syncope.  Patient's only complaint is a red raised area in her right antecubital.  She states that this site was not used for her IV during the procedure.  Patient tells me that she has had blood clots in the past and this is how they presented.  She states that the area itches but is not painful.      Current Outpatient Medications   Medication Sig Dispense Refill   • amLODIPine (NORVASC) 5 MG tablet Take 1 tablet by mouth daily. 90 tablet 3   • Calcium Carbonate (CALTRATE 600) 1500 (600 Ca) MG tablet Take 600 mg by mouth Daily.     • clopidogrel (PLAVIX) 75 MG tablet Take 1 tablet by mouth Daily. 90 tablet 3   • DULoxetine (CYMBALTA) 60 MG capsule Take 60 mg by mouth Daily.     • furosemide (LASIX) 20 MG tablet Take 1 tablet daily for 3 pound weight gain (Patient taking differently: Daily As Needed. Take 1 tablet daily for 3 pound weight gain) 30 tablet 0   • glimepiride (AMARYL) 2 MG tablet Take 4 mg by mouth Daily.     • lisinopril (PRINIVIL,ZESTRIL) 20 MG tablet Take 20 mg by mouth Daily.     • metFORMIN (GLUCOPHAGE) 500 MG tablet Take 1,000 mg by mouth 2 (Two) Times a Day With Meals.     • metoprolol tartrate (LOPRESSOR) 50 MG tablet Take 1 tablet by mouth daily. 90 tablet 3   • Multiple Vitamins-Minerals (EYE VITAMINS) capsule Take  by mouth.     • omeprazole (priLOSEC) 40 MG capsule Take 40 mg by mouth Daily.     • potassium chloride (K-DUR) 10 MEQ CR tablet Take with Lasix 30 tablet 0   • warfarin (COUMADIN) 5 MG tablet Take 1 tablet by mouth Daily. 90 tablet 0   • Evolocumab with Infusor (REPATHA PUSHTRONEX SYSTEM) 420 MG/3.5ML solution cartridge Inject 1 Device under the skin into the appropriate area as directed Every 30 (Thirty) Days. 3 cartridge. 3     No current facility-administered medications for this visit.        ALLERGIES    Eliquis [apixaban] and Soma [carisoprodol]    Past Medical History:   Diagnosis Date   • Arrhythmia    • Atrial fibrillation (CMS/HCC)    • Fibromyalgia    •  Histoplasmosis    • Hyperlipidemia    • Hypertension    • Osteoarthritis    • SSS (sick sinus syndrome) (CMS/[facility])        Social History     Socioeconomic History   • Marital status:      Spouse name: Not on file   • Number of children: Not on file   • Years of education: Not on file   • Highest education level: Not on file   Tobacco Use   • Smoking status: Never Smoker   • Smokeless tobacco: Never Used   Substance and Sexual Activity   • Alcohol use: No   • Drug use: No   • Sexual activity: Defer       Family History   Problem Relation Age of Onset   • Heart disease Mother    • Heart disease Father        Review of Systems   Constitutional: Positive for diaphoresis (night sweats) and fatigue (chronic fatigue). Negative for chills and fever.   HENT: Positive for hearing loss. Negative for congestion, nosebleeds, postnasal drip, rhinorrhea, sinus pressure, sinus pain, sneezing, sore throat and tinnitus.    Eyes: Positive for visual disturbance (wears glasses).   Respiratory: Positive for cough (occas dry cough in the last 2 months), shortness of breath (with daily activity ) and wheezing. Negative for apnea and chest tightness.    Cardiovascular: Positive for palpitations (occas racing) and leg swelling (occas at night). Negative for chest pain.   Gastrointestinal: Positive for diarrhea (chronic). Negative for abdominal distention, abdominal pain, blood in stool, constipation, nausea and vomiting.        Scheduled to Dr Ivey in November   Endocrine: Negative.    Genitourinary: Negative.  Negative for hematuria.   Musculoskeletal: Positive for arthralgias, back pain, myalgias and neck pain.   Skin: Negative.    Allergic/Immunologic: Negative.  Negative for environmental allergies and food allergies.   Neurological: Positive for weakness and light-headedness (at times with quick movement). Negative for dizziness, syncope, numbness and headaches.   Hematological: Bruises/bleeds easily (bruise due to Coumadin).  "  Psychiatric/Behavioral: Negative for agitation and sleep disturbance. The patient is not nervous/anxious.        Objective   /80 (BP Location: Left arm, Patient Position: Sitting)   Pulse 72   Ht 167.6 cm (65.98\")   Wt 72.8 kg (160 lb 9.6 oz)   SpO2 96%   BMI 25.93 kg/m²   Vitals:    09/20/19 1031   BP: 136/80   BP Location: Left arm   Patient Position: Sitting   Pulse: 72   SpO2: 96%   Weight: 72.8 kg (160 lb 9.6 oz)   Height: 167.6 cm (65.98\")      Lab Results (most recent)     None        Physical Exam   Constitutional: She is oriented to person, place, and time. She appears well-developed and well-nourished. No distress.   HENT:   Head: Normocephalic and atraumatic.   Eyes: Pupils are equal, round, and reactive to light.   Neck: Normal range of motion. No hepatojugular reflux and no JVD present. Carotid bruit is not present. No tracheal deviation present.   Cardiovascular: Normal rate, regular rhythm, normal heart sounds and intact distal pulses.   Pulses:       Radial pulses are 2+ on the right side, and 2+ on the left side.   Pulmonary/Chest: Effort normal and breath sounds normal. She has no wheezes. She has no rhonchi. She has no rales.   Abdominal: Soft. Bowel sounds are normal. There is no tenderness.   Musculoskeletal: Normal range of motion. She exhibits no edema.   Neurological: She is alert and oriented to person, place, and time. She has normal strength.   Skin: Skin is warm, dry and intact. Capillary refill takes 2 to 3 seconds. Rash noted. Rash is vesicular.   Small red raised area with fascicular pattern noted to right antecubital space and also to right upper bicep.     Psychiatric: She has a normal mood and affect. Her speech is normal and behavior is normal. Judgment and thought content normal. Cognition and memory are normal.   Nursing note and vitals reviewed.      Procedure   Procedures         Assessment/Plan      Diagnosis Plan   1. Status post left heart catheterization     2. " Bug bite, initial encounter  hydrocortisone 1 % cream   3. Atrial fibrillation, unspecified type (CMS/HCC)     4. Mixed hyperlipidemia         Return in about 6 months (around 3/20/2020).  Patient status post left heart cath with stenting to the right coronary artery and left anterior descending artery.  Patient will be continued on Plavix.  Nanci reports some palpitations and fluttering on occasion with exertion given her history of atrial fibrillation and possible clots in the past patient will also be continued on Coumadin 5 mg.  Patient encouraged to monitor for bleeding and report any blood loss to her primary care physician or our office.  With her significant CAD and history of hyperlipidemia we will continue the patient on Repatha.    Several small red raised areas to right upper extremity, likely related to an insect bite.  Patient states that she is using an antibiotic cream on the areas but I would like to prescribe her hydrocortisone 1% cream to use 3 times daily.  She will call her primary care physician if the area were to get worse.      Patient has adequate refills at this time she will call the office if she were to need any refills prior to her next follow-up appointment.  She will also call the office with any new onset of symptoms or worsening problems.                 Patient's Body mass index is 25.93 kg/m². BMI is within normal parameters. No follow-up required..      Electronically signed by:

## 2019-09-25 ENCOUNTER — LAB (OUTPATIENT)
Dept: LAB | Facility: HOSPITAL | Age: 79
End: 2019-09-25

## 2019-09-25 DIAGNOSIS — R06.02 SHORTNESS OF BREATH: ICD-10-CM

## 2019-09-25 LAB
INR PPP: 2.68 (ref 0.9–1.1)
PROTHROMBIN TIME: 29.8 SECONDS (ref 11–15.4)

## 2019-09-25 PROCEDURE — 85610 PROTHROMBIN TIME: CPT | Performed by: PHYSICIAN ASSISTANT

## 2019-09-25 PROCEDURE — 36415 COLL VENOUS BLD VENIPUNCTURE: CPT

## 2019-09-26 ENCOUNTER — ANTICOAGULATION VISIT (OUTPATIENT)
Dept: CARDIOLOGY | Facility: CLINIC | Age: 79
End: 2019-09-26

## 2019-09-26 ENCOUNTER — TELEPHONE (OUTPATIENT)
Dept: CARDIOLOGY | Facility: CLINIC | Age: 79
End: 2019-09-26

## 2019-09-26 LAB — INR PPP: 2.68 (ref 2–3)

## 2019-10-03 RX ORDER — FUROSEMIDE 20 MG/1
20 TABLET ORAL DAILY PRN
Qty: 30 TABLET | Refills: 1 | Status: SHIPPED | OUTPATIENT
Start: 2019-10-03 | End: 2019-10-08 | Stop reason: SDUPTHER

## 2019-10-03 NOTE — TELEPHONE ENCOUNTER
Faxed request from [facility] for a refill on Lasix.  Rx sent.  SHAHAB EDMOND

## 2019-10-08 ENCOUNTER — TELEPHONE (OUTPATIENT)
Dept: CARDIOLOGY | Facility: CLINIC | Age: 79
End: 2019-10-08

## 2019-10-08 RX ORDER — FUROSEMIDE 20 MG/1
20 TABLET ORAL DAILY PRN
Qty: 30 TABLET | Refills: 11 | Status: SHIPPED | OUTPATIENT
Start: 2019-10-08 | End: 2019-10-14 | Stop reason: SDUPTHER

## 2019-10-08 NOTE — TELEPHONE ENCOUNTER
----- Message from Tegan Gannon sent at 10/8/2019  3:58 PM EDT -----  MEDICATION(S):  FUROSEMIDE 20MG        PHARMACY:  [facility]

## 2019-10-14 RX ORDER — FUROSEMIDE 20 MG/1
20 TABLET ORAL DAILY PRN
Qty: 60 TABLET | Refills: 3 | Status: SHIPPED | OUTPATIENT
Start: 2019-10-14 | End: 2022-02-08

## 2019-10-24 ENCOUNTER — LAB (OUTPATIENT)
Dept: LAB | Facility: HOSPITAL | Age: 79
End: 2019-10-24

## 2019-10-24 DIAGNOSIS — R06.02 SHORTNESS OF BREATH: ICD-10-CM

## 2019-10-24 LAB
INR PPP: 2.49 (ref 0.9–1.1)
PROTHROMBIN TIME: 28.1 SECONDS (ref 11–15.4)

## 2019-10-24 PROCEDURE — 85610 PROTHROMBIN TIME: CPT | Performed by: PHYSICIAN ASSISTANT

## 2019-10-24 PROCEDURE — 36415 COLL VENOUS BLD VENIPUNCTURE: CPT

## 2019-10-25 ENCOUNTER — TELEPHONE (OUTPATIENT)
Dept: CARDIOLOGY | Facility: CLINIC | Age: 79
End: 2019-10-25

## 2019-10-25 ENCOUNTER — ANTICOAGULATION VISIT (OUTPATIENT)
Dept: CARDIOLOGY | Facility: CLINIC | Age: 79
End: 2019-10-25

## 2019-10-25 LAB — INR PPP: 2.49 (ref 2–3)

## 2019-11-15 ENCOUNTER — OFFICE VISIT (OUTPATIENT)
Dept: CARDIOLOGY | Facility: CLINIC | Age: 79
End: 2019-11-15

## 2019-11-15 DIAGNOSIS — I49.5 SICK SINUS SYNDROME (HCC): Primary | ICD-10-CM

## 2019-11-15 PROCEDURE — 93288 INTERROG EVL PM/LDLS PM IP: CPT | Performed by: INTERNAL MEDICINE

## 2019-11-25 ENCOUNTER — LAB (OUTPATIENT)
Dept: LAB | Facility: HOSPITAL | Age: 79
End: 2019-11-25

## 2019-11-25 DIAGNOSIS — R06.02 SHORTNESS OF BREATH: ICD-10-CM

## 2019-11-25 LAB
INR PPP: 2.6 (ref 0.9–1.1)
PROTHROMBIN TIME: 29 SECONDS (ref 11–15.4)

## 2019-11-25 PROCEDURE — 85610 PROTHROMBIN TIME: CPT | Performed by: PHYSICIAN ASSISTANT

## 2019-11-25 PROCEDURE — 36415 COLL VENOUS BLD VENIPUNCTURE: CPT

## 2019-11-26 ENCOUNTER — ANTICOAGULATION VISIT (OUTPATIENT)
Dept: CARDIOLOGY | Facility: CLINIC | Age: 79
End: 2019-11-26

## 2019-11-26 ENCOUNTER — TELEPHONE (OUTPATIENT)
Dept: CARDIOLOGY | Facility: CLINIC | Age: 79
End: 2019-11-26

## 2019-11-26 LAB — INR PPP: 2.6 (ref 2–3)

## 2019-12-02 DIAGNOSIS — I25.84 CORONARY ARTERY DISEASE DUE TO CALCIFIED CORONARY LESION: ICD-10-CM

## 2019-12-02 DIAGNOSIS — E78.2 MIXED HYPERLIPIDEMIA: ICD-10-CM

## 2019-12-02 DIAGNOSIS — I25.10 CORONARY ARTERY DISEASE DUE TO CALCIFIED CORONARY LESION: ICD-10-CM

## 2019-12-02 NOTE — TELEPHONE ENCOUNTER
DAMIAN FROM [employer] L/M NEEDING PA FOR REPATHA AND R/Q IT BE FAXED [phone]. CJ SAUCEDA HAS NOT RECEIVED A PA REQUEST FOR REPATHA, SO REPATHA REFILLS ELECTRO' DIN TO [facility] SO KOMAL CAN DO PA THEN FAX TO [employer]. CJ HELTON HAD FAXED O=IN [employer] ENROLLMENT FORMS ALREADY. PH,LPN

## 2019-12-04 DIAGNOSIS — E78.2 MIXED HYPERLIPIDEMIA: ICD-10-CM

## 2019-12-04 DIAGNOSIS — I25.10 CORONARY ARTERY DISEASE DUE TO CALCIFIED CORONARY LESION: ICD-10-CM

## 2019-12-04 DIAGNOSIS — I25.84 CORONARY ARTERY DISEASE DUE TO CALCIFIED CORONARY LESION: ICD-10-CM

## 2019-12-04 NOTE — TELEPHONE ENCOUNTER
[employer] denied Repatha 90 day supply as speciality drugs are limited to a 30 day supply. Refill submitted as requested for 30 day supply. Estefania Pierson MA

## 2019-12-05 ENCOUNTER — PRIOR AUTHORIZATION (OUTPATIENT)
Dept: CARDIOLOGY | Facility: CLINIC | Age: 79
End: 2019-12-05

## 2019-12-05 NOTE — TELEPHONE ENCOUNTER
[employer] has approved a 30 day supply of REPATHA 420 MG but denied request for a 90 day supply. Approval expires 12/31/2020 and faxed to [facility] [employer]. Estefania Pierson MA

## 2019-12-11 DIAGNOSIS — I48.91 ATRIAL FIBRILLATION, UNSPECIFIED TYPE (HCC): Primary | ICD-10-CM

## 2019-12-11 RX ORDER — WARFARIN SODIUM 5 MG/1
TABLET ORAL
Qty: 90 TABLET | Refills: 3 | Status: SHIPPED | OUTPATIENT
Start: 2019-12-11 | End: 2020-12-07 | Stop reason: SDUPTHER

## 2019-12-20 ENCOUNTER — TELEPHONE (OUTPATIENT)
Dept: CARDIOLOGY | Facility: CLINIC | Age: 79
End: 2019-12-20

## 2019-12-20 NOTE — TELEPHONE ENCOUNTER
"Patient was stepping up onto sidewalk, and states she tripped. Patient's sister present with her. Gentleman nearby saw her starting to fall, tried to grab her, but patient fell reported face forward and hit left side of face. No visible ecchymosis to left/cheek, possible mild puffiness to upper cheek/cheekbone area. No visible injury to back of head, no bleeding noted from that area. Patient had blood dripping from left inner hand/palm area with visible approx. 3-4 \" laceration to inner palm area and approx. 2 cm skin tare to inner palm directly below little finger. Mild scattered area of purple ecchymosis noted to underside of left palm. There was round areas of fresh blood on front of patient's short. Scant amount of blood noted inside of her mouth. left inner palm wiped with 4x4s, non-adherent non stick pad and then 4x4 applied and wrapped with kerlex. NILESH Harris arrived at site right off and then Suma Granados NP also arrived V/O was received by NILESH Harris to activate EMS. RADHA Smiley here at office called EMS and was told patient's sister had already called and they were in route.  Patient demographics, last device interrogation, and last office note given to EMS personnel. NILESH Malone, provider here at the office, working at [facility] ER today. Called and relayed all of above to him. Chikis, , also notified of above. PH,LPN   "

## 2020-01-08 ENCOUNTER — LAB (OUTPATIENT)
Dept: LAB | Facility: HOSPITAL | Age: 80
End: 2020-01-08

## 2020-01-08 ENCOUNTER — OFFICE VISIT (OUTPATIENT)
Dept: CARDIOLOGY | Facility: CLINIC | Age: 80
End: 2020-01-08

## 2020-01-08 VITALS
HEIGHT: 66 IN | OXYGEN SATURATION: 98 % | WEIGHT: 156.8 LBS | BODY MASS INDEX: 25.2 KG/M2 | DIASTOLIC BLOOD PRESSURE: 100 MMHG | SYSTOLIC BLOOD PRESSURE: 162 MMHG | HEART RATE: 70 BPM

## 2020-01-08 DIAGNOSIS — I48.91 ATRIAL FIBRILLATION, UNSPECIFIED TYPE (HCC): Primary | ICD-10-CM

## 2020-01-08 DIAGNOSIS — R06.02 SHORTNESS OF BREATH: ICD-10-CM

## 2020-01-08 DIAGNOSIS — R06.02 SOB (SHORTNESS OF BREATH): ICD-10-CM

## 2020-01-08 DIAGNOSIS — I10 ESSENTIAL HYPERTENSION: ICD-10-CM

## 2020-01-08 LAB
INR PPP: 1.17 (ref 0.9–1.1)
PROTHROMBIN TIME: 15.5 SECONDS (ref 11–15.4)

## 2020-01-08 PROCEDURE — 93000 ELECTROCARDIOGRAM COMPLETE: CPT | Performed by: PHYSICIAN ASSISTANT

## 2020-01-08 PROCEDURE — 36415 COLL VENOUS BLD VENIPUNCTURE: CPT

## 2020-01-08 PROCEDURE — 99213 OFFICE O/P EST LOW 20 MIN: CPT | Performed by: PHYSICIAN ASSISTANT

## 2020-01-08 PROCEDURE — 85610 PROTHROMBIN TIME: CPT | Performed by: PHYSICIAN ASSISTANT

## 2020-01-08 RX ORDER — ERGOCALCIFEROL 1.25 MG/1
1 CAPSULE ORAL DAILY
COMMUNITY
Start: 2019-11-06 | End: 2021-08-06

## 2020-01-08 RX ORDER — LANOLIN ALCOHOL/MO/W.PET/CERES
1000 CREAM (GRAM) TOPICAL DAILY
COMMUNITY
End: 2021-08-06

## 2020-01-08 NOTE — PATIENT INSTRUCTIONS

## 2020-01-08 NOTE — PROGRESS NOTES
Problem list     Subjective   Nanci Pritchard is a 79 y.o. female     Chief Complaint   Patient presents with   • Chest Pain     here for follow up   • Atrial Fibrillation   • Shortness of Breath   Problem List:  1. Sick sinus syndrome.  1.1. Greater than 3 second pause noted per event monitor.  1.2. The patient was subsequently admitted to [facility] with placement of dual-chamber pacemaker per Dr. Gutierrez.  1.3. Apparent lead dislodgment with subsequent fixation of the leads the following day post pacemaker placement.  2. Preserved systolic function  3. Chest Discomfort  3.1 Low risk stress test, 09/2016.  3.2 Stress 7/2019 possible anterolateral wall ischemia  3.3 C 8/2019 stenting of LAD and RCA  4. Hypertension  5. Atrial Fibrillation  6. Fibromyalgia    HPI    The patient presents back in today for evaluation of follow-up.  Unfortunately, she had a recent fall with subsequent head trauma and significant intracranial hemorrhage.  She was flown to the [facility] from the local hospital.  She was monitored there and almost had to have corrective surgery for her intracranial hemorrhage.  Apparently she stabilized and was followed closely by neurosurgery.  She eventually was able to be discharged without any type of mechanical intervention warranted.  Throughout that, she stayed stable from cardiovascular standpoint.  Her antiplatelet and anticoagulation therapy was held, obviously, with her intracranial hemorrhage.  She tells me that she was apparently contacted through an office in [place] and advised to restart that.  She had a PT/INR performed this morning.  I advised her that I was somewhat concerned with reinstitution of that and have encouraged her to call her neurology/neurosurgical office to ensure that she may resume that.    From general cardiovascular standpoint, the patient currently has no chest pain.  She has stable dyspnea.  She has no palpitations or  dysrhythmic symptoms of any significance otherwise.  Blood pressures are elevated today.  She feels that that could be related to her severe head and facial pain, persistent post fall with head trauma.  She will be monitoring that closely however and will call to us for persistent hypertension.  We have also discussed consideration for updating cardiac testing.  I would rather ensure stability from standpoint of neurosurgery/neurology before we consider any testing at this time.  Current Outpatient Medications on File Prior to Visit   Medication Sig Dispense Refill   • amLODIPine (NORVASC) 5 MG tablet Take 1 tablet by mouth daily. 90 tablet 3   • Calcium Carbonate (CALTRATE 600) 1500 (600 Ca) MG tablet Take 600 mg by mouth Daily.     • clopidogrel (PLAVIX) 75 MG tablet Take 1 tablet by mouth Daily. 90 tablet 3   • DULoxetine (CYMBALTA) 60 MG capsule Take 60 mg by mouth Daily.     • Evolocumab with Infusor (REPATHA PUSHTRONEX SYSTEM) 420 MG/3.5ML solution cartridge Inject 1 Device under the skin into the appropriate area as directed Every 30 (Thirty) Days. 1 cartridge. 11   • furosemide (LASIX) 20 MG tablet Take 1 tablet by mouth Daily As Needed (For edema). Take 1 tablet daily for 3 pound weight gain 60 tablet 3   • glimepiride (AMARYL) 2 MG tablet Take 4 mg by mouth Daily.     • lisinopril (PRINIVIL,ZESTRIL) 20 MG tablet Take 20 mg by mouth Daily.     • metFORMIN (GLUCOPHAGE) 500 MG tablet Take 1,000 mg by mouth 2 (Two) Times a Day With Meals.     • metoprolol tartrate (LOPRESSOR) 50 MG tablet Take 1 tablet by mouth daily. 90 tablet 3   • Multiple Vitamins-Minerals (EYE VITAMINS) capsule Take  by mouth.     • omeprazole (priLOSEC) 40 MG capsule Take 40 mg by mouth Daily.     • potassium chloride (K-DUR) 10 MEQ CR tablet Take with Lasix 30 tablet 0   • vitamin B-12 (CYANOCOBALAMIN) 1000 MCG tablet Take 1,000 mcg by mouth Daily.     • vitamin D (ERGOCALCIFEROL) 1.25 MG (46587 UT) capsule capsule Take 1 capsule by  mouth Daily.     • warfarin (COUMADIN) 5 MG tablet TAKE 1 TABLET EVERY DAY 90 tablet 3     No current facility-administered medications on file prior to visit.        Eliquis [apixaban] and Soma [carisoprodol]    Past Medical History:   Diagnosis Date   • Arrhythmia    • Atrial fibrillation (CMS/[facility])    • Fibromyalgia    • Fracture of face bones due to fall, closed, initial encounter (CMS/[facility])    • Histoplasmosis    • Hyperlipidemia    • Hypertension    • Osteoarthritis    • SSS (sick sinus syndrome) (CMS/[facility])        Social History     Socioeconomic History   • Marital status:      Spouse name: Not on file   • Number of children: Not on file   • Years of education: Not on file   • Highest education level: Not on file   Tobacco Use   • Smoking status: Never Smoker   • Smokeless tobacco: Never Used   Substance and Sexual Activity   • Alcohol use: No   • Drug use: No   • Sexual activity: Defer       Family History   Problem Relation Age of Onset   • Heart disease Mother    • Heart disease Father        Review of Systems   Constitutional: Positive for fatigue (stays fatigued).   HENT: Positive for nosebleeds (when blows nose, pours blood). Negative for congestion, rhinorrhea and sore throat.    Eyes: Positive for visual disturbance (wears glasses daily).   Respiratory: Negative.  Negative for chest tightness, shortness of breath and wheezing.    Cardiovascular: Positive for palpitations (occasional flutters). Negative for chest pain and leg swelling.   Gastrointestinal: Negative.  Negative for abdominal pain, nausea and vomiting.   Endocrine: Negative.  Negative for cold intolerance and heat intolerance.   Genitourinary: Negative.  Negative for difficulty urinating, frequency and urgency.   Musculoskeletal: Positive for back pain (back surgery) and neck pain (from fall on 12/20/2019). Negative for arthralgias.   Skin: Negative.  Negative for rash and wound.   Allergic/Immunologic: Negative.  Negative for  "environmental allergies and food allergies.   Neurological: Positive for syncope (felt like she was going to pass out this morning). Negative for dizziness and light-headedness.   Hematological: Bruises/bleeds easily (bruises/blds easily).   Psychiatric/Behavioral: Positive for agitation (easily agitated) and confusion (easily confused, waking up not knowing where she is at). Negative for sleep disturbance (denies waking up smothering/SOA). The patient is nervous/anxious (easily nervous/anxious).        Objective   Vitals:    01/08/20 1113   BP: 162/100   BP Location: Left arm   Patient Position: Sitting   Pulse: 70   SpO2: 98%   Weight: 71.1 kg (156 lb 12.8 oz)   Height: 167.6 cm (66\")      /100 (BP Location: Left arm, Patient Position: Sitting)   Pulse 70   Ht 167.6 cm (66\")   Wt 71.1 kg (156 lb 12.8 oz)   SpO2 98%   BMI 25.31 kg/m²    Lab Results (most recent)     None        Physical Exam   Constitutional: She is oriented to person, place, and time. She appears well-developed and well-nourished. No distress.   HENT:   Head: Normocephalic and atraumatic.   Eyes: Pupils are equal, round, and reactive to light. Conjunctivae and EOM are normal.   Neck: Normal range of motion. Neck supple. No JVD present. No tracheal deviation present.   Cardiovascular: Normal rate, regular rhythm, normal heart sounds and intact distal pulses.   Pulmonary/Chest: Effort normal and breath sounds normal.   Abdominal: Soft. Bowel sounds are normal. She exhibits no distension and no mass. There is no tenderness. There is no rebound and no guarding.   Musculoskeletal: Normal range of motion. She exhibits no edema, tenderness or deformity.   Neurological: She is alert and oriented to person, place, and time.   Skin: Skin is warm and dry. No rash noted. No erythema. No pallor.   Psychiatric: She has a normal mood and affect. Her behavior is normal. Judgment and thought content normal.   Nursing note and vitals " reviewed.        Procedure     ECG 12 Lead  Date/Time: 1/8/2020 11:42 AM  Performed by: Edilson Souza PA  Authorized by: Edilson Souza PA   Comparison: compared with previous ECG from 5/28/2019  Comparison to previous ECG: Sinus rhythm at 73, normal axis, no acute changes noted.                 Assessment/Plan      Diagnosis Plan   1. Atrial fibrillation, unspecified type (CMS/HCC)  ECG 12 Lead   2. SOB (shortness of breath)  ECG 12 Lead   3. Essential hypertension  Protime-INR     1.  At this time, the patient appears to be fairly stable from cardiovascular standpoint.  I would continue rate control medications.  I have again asked that she call to her neurology/neurosurgical clinic prior to reinstitution of antiplatelet or anticoagulation therapy given her recent extensive intracranial hemorrhage.  I will defer this decision for reinstitution of that to that service.  I have reviewed that with her today.    2.  Symptomatically, the patient appears to be doing reasonably well from cardiovascular standpoint.  She has stable dyspnea.  She really has no real dysrhythmic or anginal type symptoms or complaints at this time.  I have discussed with her consideration for eventually updating cardiac testing.  She just is not stable enough at this time.  I would want to ensure ongoing stability from neurosurgical standpoint before we consider any of those tests/options.  We will see her back and review her clinical course very closely through the clinic.    3.  Blood pressures appear to fluctuate widely at home.  She feels that this is related to pain from recent facial and head trauma.  She will be seeing her primary care for pain management.  If blood pressures do not improve, she will call and I will consider intensification of antihypertensive therapy at that time.    4.  I will see her very closely through the clinic.  We will be seeing her routinely.  For any issues prior to follow-up she will call immediately.   Further pending clinical course or complications otherwise.  Again we will see her routinely.                   Electronically signed by:

## 2020-01-09 ENCOUNTER — ANTICOAGULATION VISIT (OUTPATIENT)
Dept: CARDIOLOGY | Facility: CLINIC | Age: 80
End: 2020-01-09

## 2020-01-09 ENCOUNTER — TELEPHONE (OUTPATIENT)
Dept: CARDIOLOGY | Facility: CLINIC | Age: 80
End: 2020-01-09

## 2020-01-09 LAB — INR PPP: 1.17 (ref 2–3)

## 2020-01-09 NOTE — TELEPHONE ENCOUNTER
STATES DIDN'T TAKE DOSE LAST NIGHT DUE TO STILL HAVING SOME BLEEDING ISSUES. NO DOSAGE CHANGE AND RECHECK LEVEL IN 1 WEEK PER SHELLI HENDRICKSON, PAC. VERBAL ORDERS READ BACK AND VERIFIED BY SHELLI HENDRICKSON PAC. PATIENT AWARE VERBALIZED OK. PH,LPN

## 2020-01-10 ENCOUNTER — TELEPHONE (OUTPATIENT)
Dept: CARDIOLOGY | Facility: CLINIC | Age: 80
End: 2020-01-10

## 2020-01-10 NOTE — TELEPHONE ENCOUNTER
"PATIENT STATES NEURO. TOLD HER INITIALLY TO GO BACK ON HER COUMADIN AND PLAVIX, BUT WHEN SHE TALKED WITH THEM LAST THEY TOLD HER NO THAT THE DOCTOR HAD WANTED HER OFF OF ALL BLOOD THINNERS DUE TO TEST HE WANTED DONE ON HER Tuesday WHEN SHE COMES BACK UP THERE FOR F/U. SHE STATES SHE TOLD THEIR OFFICE THIS MOST RECENT TELEPHONE CONVERSATION THAT THEY ASKED HER, \"WELL WHO TOLD YOU FROM OUR OFFICE TO GO BACK ON THOSE MEDICATIONS?\" AND SHE TOLD THEM, \"WELL I DON'T KNOW BUT THEY DID\". SO PATIENT HAS STOPPED TAKING BOTH COUMADIN AND PLAVIX, EVEN THOUGH SHE HAD APPROX. RESTARTED THEM ABOUT A WEEK- A WEEK AND A HALF AGO,  BUT HAD SKIPPED A COUMADIN DOSE DUE TO THE THROAT/NASAL DRAINAGE DARK BLEEDING SHE HAD COMING UP. SHE STATES ALL OLD BLEEDING HAS STOPPED. I RELAYED TO J=HER SHELLI' CONCERN OVER HER NOT HAVING ANTI-PLATELET COVERAGE FOR HER RECENT STENTS, AND SHE \"YEAH I KNOW\". SHE IS AWARE THOUGH OF OUR CONCERN, BUT THAT WE HAVE TO HONOR THE NEUROLOGIST'S RECOMMENDATIONS. I REQUESTED THAT AT THAT VISIT ON TUES. THAT SHE VOICE OUR CONCERNS REGARDING THIS AND TO PLEASE CALL OUR OFFICE THAT AFTERNOON AFTER APPT. IF SHE CAN AND LET OUR OFFICE KNOW WHAT THEY ARE RECOMMENDING ON RESTARTING THESE MEDS. STATED SHE WOULD. ANUSHKA GARCIA            ----- Message from RAYA Louie sent at 1/10/2020 12:52 PM EST -----  I am still concerned about recent stenting.  We need to be very cautious about antiplatelet therapy.  However, direction of anticoagulation/antiplatelet therapy will have to be at the discretion of her neuro/neurosurgical team.  ----- Message -----  From: Pebbles Manuel  Sent: 1/10/2020  10:28 AM EST  To: RAYA Louie, Charleen Jorgensen LPN    Pt states that she was supposed to call Shelli and let him know she heard from doctor in [place]. States she was told \"do not take any blood thinner at all.\" Pt states she's not taking any blood thinner at all, including aspirin 81mg.       "

## 2020-01-14 ENCOUNTER — TELEPHONE (OUTPATIENT)
Dept: CARDIOLOGY | Facility: CLINIC | Age: 80
End: 2020-01-14

## 2020-01-14 NOTE — TELEPHONE ENCOUNTER
V/o per Edilson Souza, PAC to restart Coumadin at previous dosing and Plavix since cleared by neuro.  and have INR drawn 1 week after restarts Coumadin. Patient states will restart Plavix in am. Verbalized she understood. PH,LPN            ----- Message from Pebbles Manuel sent at 1/14/2020  1:14 PM EST -----  Pt states that she was told when she gets through w/ her appt w/ brain surgeon to call our office. States she has no bleeding of the brain and is allowed restart blood thinners. States she was told to tell Edilson and will need to know when to recheck INR and restart coumadin.

## 2020-01-21 ENCOUNTER — TELEPHONE (OUTPATIENT)
Dept: CARDIOLOGY | Facility: CLINIC | Age: 80
End: 2020-01-21

## 2020-01-21 NOTE — TELEPHONE ENCOUNTER
RETURNING THEIR CALL REGARDING HUSBANDS COUMADIN AND MRS. OAKES WANTS SHELLI TO GO AHEAD AND SEND HER TO AN ENT. TOLD HER I WOULD FORWARD REQUEST TO CHAS, HIS NURSE AND SHE WILL DISCUSS WITH SHELLI. VERBALIZED OK. PH,LPN

## 2020-01-22 ENCOUNTER — APPOINTMENT (OUTPATIENT)
Dept: LAB | Facility: HOSPITAL | Age: 80
End: 2020-01-22

## 2020-01-22 LAB
INR PPP: 1.29 (ref 0.9–1.1)
PROTHROMBIN TIME: 16.7 SECONDS (ref 11–15.4)

## 2020-01-22 PROCEDURE — 36415 COLL VENOUS BLD VENIPUNCTURE: CPT | Performed by: PHYSICIAN ASSISTANT

## 2020-01-22 PROCEDURE — 85610 PROTHROMBIN TIME: CPT | Performed by: PHYSICIAN ASSISTANT

## 2020-01-23 ENCOUNTER — ANTICOAGULATION VISIT (OUTPATIENT)
Dept: CARDIOLOGY | Facility: CLINIC | Age: 80
End: 2020-01-23

## 2020-01-23 ENCOUNTER — TELEPHONE (OUTPATIENT)
Dept: CARDIOLOGY | Facility: CLINIC | Age: 80
End: 2020-01-23

## 2020-01-23 LAB — INR PPP: 1.29 (ref 2–3)

## 2020-01-23 NOTE — TELEPHONE ENCOUNTER
PATIENT HAS BEEN BACK ON COUMADIN FOR APPROX. A WEEK, BUT DID MISS 1-'S DOSE. NO DOSAGE CHANGE AND RECHECK LEVEL IN 1 WEEK PER SHELLI HENDRICKSON, PAC. VERBAL ORDERS READ BACK AND VERIFIED BY NILESH PAINTING. PATIENT AWARE VERBALIZED OK. RADHA,LPN

## 2020-01-24 DIAGNOSIS — R04.0 EPISTAXIS: Primary | ICD-10-CM

## 2020-01-29 ENCOUNTER — LAB (OUTPATIENT)
Dept: LAB | Facility: HOSPITAL | Age: 80
End: 2020-01-29

## 2020-01-29 DIAGNOSIS — I10 ESSENTIAL HYPERTENSION: ICD-10-CM

## 2020-01-29 LAB
INR PPP: 1.82 (ref 0.9–1.1)
PROTHROMBIN TIME: 21.9 SECONDS (ref 11–15.4)

## 2020-01-29 PROCEDURE — 36415 COLL VENOUS BLD VENIPUNCTURE: CPT

## 2020-01-29 PROCEDURE — 85610 PROTHROMBIN TIME: CPT | Performed by: PHYSICIAN ASSISTANT

## 2020-01-30 ENCOUNTER — ANTICOAGULATION VISIT (OUTPATIENT)
Dept: CARDIOLOGY | Facility: CLINIC | Age: 80
End: 2020-01-30

## 2020-01-30 ENCOUNTER — TELEPHONE (OUTPATIENT)
Dept: CARDIOLOGY | Facility: CLINIC | Age: 80
End: 2020-01-30

## 2020-01-30 LAB — INR PPP: 1.82 (ref 2–3)

## 2020-02-03 ENCOUNTER — ANTICOAGULATION VISIT (OUTPATIENT)
Dept: CARDIOLOGY | Facility: CLINIC | Age: 80
End: 2020-02-03

## 2020-02-03 DIAGNOSIS — I48.0 PAROXYSMAL ATRIAL FIBRILLATION (HCC): Primary | ICD-10-CM

## 2020-02-03 LAB — INR PPP: 1.82

## 2020-02-03 NOTE — PATIENT INSTRUCTIONS
CALLED PATIENT AND NOTIFIED HER OF VERBAL ORDERS FROM SHELLI HENDRICKSON PA-C TO CONTINUE CURRENT MEDICATION REGIMEN AND RE-CHECK IN 1 WEEK. PATIENT VERBALIZED UNDERSTANDING AND HAD NO FURTHER QUESTIONS AT THIS TIME. -KAT;MACARIO

## 2020-02-03 NOTE — PROGRESS NOTES
CURRENT PT/INR RESULTS RECEIVED IN OFFICE AND REVIEWED BY SHELLI HENDRICKSON PA-C. VERBAL ORDERS GIVEN TO CONTINUE CURRENT MEDICATION REGIMEN AND RE-CHECK IN 1 WEEK. -KAT;MACARIO

## 2020-02-11 ENCOUNTER — LAB (OUTPATIENT)
Dept: LAB | Facility: HOSPITAL | Age: 80
End: 2020-02-11

## 2020-02-11 ENCOUNTER — TELEPHONE (OUTPATIENT)
Dept: CARDIOLOGY | Facility: CLINIC | Age: 80
End: 2020-02-11

## 2020-02-11 ENCOUNTER — ANTICOAGULATION VISIT (OUTPATIENT)
Dept: CARDIOLOGY | Facility: CLINIC | Age: 80
End: 2020-02-11

## 2020-02-11 DIAGNOSIS — I10 ESSENTIAL HYPERTENSION: ICD-10-CM

## 2020-02-11 LAB
INR PPP: 2.4 (ref 0.9–1.1)
INR PPP: 2.4 (ref 2–3)
PROTHROMBIN TIME: 27.3 SECONDS (ref 11–15.4)

## 2020-02-11 PROCEDURE — 36415 COLL VENOUS BLD VENIPUNCTURE: CPT

## 2020-02-11 PROCEDURE — 85610 PROTHROMBIN TIME: CPT | Performed by: PHYSICIAN ASSISTANT

## 2020-02-11 NOTE — PROGRESS NOTES
NO DOSAGE CHANGE AND RECHECK LEVEL IN 3 WEEKS PER SHELLI HENDRICKSON, PAC. VERBAL ORDERS READ BACK AND VERIFIED BY NILESH PAINTING. PATIENT'S  AWARE VERBALIZED OK. PH,LPN

## 2020-02-25 ENCOUNTER — LAB (OUTPATIENT)
Dept: LAB | Facility: HOSPITAL | Age: 80
End: 2020-02-25

## 2020-02-25 DIAGNOSIS — I10 ESSENTIAL HYPERTENSION: ICD-10-CM

## 2020-02-25 LAB
INR PPP: 2.28 (ref 0.9–1.1)
PROTHROMBIN TIME: 26.2 SECONDS (ref 11–15.4)

## 2020-02-25 PROCEDURE — 85610 PROTHROMBIN TIME: CPT | Performed by: PHYSICIAN ASSISTANT

## 2020-02-25 PROCEDURE — 36415 COLL VENOUS BLD VENIPUNCTURE: CPT

## 2020-02-26 ENCOUNTER — TELEPHONE (OUTPATIENT)
Dept: CARDIOLOGY | Facility: CLINIC | Age: 80
End: 2020-02-26

## 2020-02-26 ENCOUNTER — ANTICOAGULATION VISIT (OUTPATIENT)
Dept: CARDIOLOGY | Facility: CLINIC | Age: 80
End: 2020-02-26

## 2020-02-26 DIAGNOSIS — I10 ESSENTIAL HYPERTENSION: Primary | ICD-10-CM

## 2020-02-26 LAB — INR PPP: 2.28 (ref 2–3)

## 2020-02-26 RX ORDER — LISINOPRIL 20 MG/1
20 TABLET ORAL 2 TIMES DAILY
Qty: 60 TABLET | Refills: 3 | Status: SHIPPED | OUTPATIENT
Start: 2020-02-26 | End: 2020-07-14

## 2020-02-26 NOTE — TELEPHONE ENCOUNTER
"----- Message from Charleen Jorgensen LPN sent at 2/26/2020 10:00 AM EST -----  CALLED PATIENT REGARDING INR RESULTS. STATES B/P YEST. AT [facility], B/P /112 AND THIS /103. STATES HAS BEEN \"SEEING DOUBLE AND WALKING SIDEWAYS\". PLEASE CALL PATIENT WITH RECOMMENDATIONS. ANUSHKA GARCIA      Called pt with provider, Edilson Souza PA-C verbal recommendations,  Up lisinopril to 20 mg twice a day. I sent in refill for pt.  Pt verbalized understanding and had no further questions at this time.  Sharlene Kaiser MA    "

## 2020-03-03 ENCOUNTER — OFFICE VISIT (OUTPATIENT)
Dept: CARDIOLOGY | Facility: CLINIC | Age: 80
End: 2020-03-03

## 2020-03-03 VITALS
BODY MASS INDEX: 25.07 KG/M2 | SYSTOLIC BLOOD PRESSURE: 149 MMHG | DIASTOLIC BLOOD PRESSURE: 77 MMHG | WEIGHT: 156 LBS | OXYGEN SATURATION: 96 % | HEART RATE: 83 BPM | HEIGHT: 66 IN

## 2020-03-03 DIAGNOSIS — I48.91 ATRIAL FIBRILLATION, UNSPECIFIED TYPE (HCC): Primary | ICD-10-CM

## 2020-03-03 DIAGNOSIS — I10 ESSENTIAL HYPERTENSION: ICD-10-CM

## 2020-03-03 DIAGNOSIS — R07.2 PRECORDIAL PAIN: ICD-10-CM

## 2020-03-03 DIAGNOSIS — R00.2 PALPITATIONS: ICD-10-CM

## 2020-03-03 PROCEDURE — 99214 OFFICE O/P EST MOD 30 MIN: CPT | Performed by: PHYSICIAN ASSISTANT

## 2020-03-03 NOTE — PATIENT INSTRUCTIONS

## 2020-03-03 NOTE — PROGRESS NOTES
Problem list     Subjective   Nanci Pritchard is a 79 y.o. female     Chief Complaint   Patient presents with   • Follow-up     1-2 mth f/u   Problem List:  1. Sick sinus syndrome.  1.1. Greater than 3 second pause noted per event monitor.  1.2. The patient was subsequently admitted to [facility] with placement of dual-chamber pacemaker per Dr. Gutierrez.  1.3. Apparent lead dislodgment with subsequent fixation of the leads the following day post pacemaker placement.  2. Preserved systolic function  3. Chest Discomfort  3.1 Low risk stress test, 09/2016.  3.2 Stress 7/2019 possible anterolateral wall ischemia  3.3 C 8/2019 stenting of LAD and RCA  4. Hypertension  5. Atrial Fibrillation  6. Fibromyalgia    HPI  The patient presents back today for evaluation and follow-up.  She continues to improve steadily after recent fall with subsequent cerebral hemorrhage.  She is concerned about some cardiac symptoms however.  She tells me that even with minor activity, she has a very forceful, tachycardic sensation in the precordium of the chest.  This will last until she rests and then eventually subsides after several minute of rest.  She has weakness and mild dizziness at that time.  The patient also has started noticing episodes of chest discomfort.  She will report precordial chest tightness.  This causes exacerbations in her baseline dyspnea.  She has no associated nausea or diaphoresis at that time.  She reports no recent episodes of neck, arm, or jaw discomfort, outside of what she typically has with fibromyalgia.  She reports no PND or orthopnea.  She has no significant lower extremity edema.  Blood pressures fluctuate widely at home but are largely controlled.  She has no further complaints otherwise.    Current Outpatient Medications on File Prior to Visit   Medication Sig Dispense Refill   • amLODIPine (NORVASC) 5 MG tablet Take 1 tablet by mouth daily. 90 tablet 3   • Calcium Carbonate  (CALTRATE 600) 1500 (600 Ca) MG tablet Take 600 mg by mouth Daily.     • clopidogrel (PLAVIX) 75 MG tablet Take 1 tablet by mouth Daily. 90 tablet 3   • DULoxetine (CYMBALTA) 60 MG capsule Take 60 mg by mouth Daily.     • Evolocumab with Infusor (REPATHA PUSHTRONEX SYSTEM) 420 MG/3.5ML solution cartridge Inject 1 Device under the skin into the appropriate area as directed Every 30 (Thirty) Days. 1 cartridge. 11   • furosemide (LASIX) 20 MG tablet Take 1 tablet by mouth Daily As Needed (For edema). Take 1 tablet daily for 3 pound weight gain 60 tablet 3   • glimepiride (AMARYL) 2 MG tablet Take 4 mg by mouth Daily.     • lisinopril (PRINIVIL,ZESTRIL) 20 MG tablet Take 1 tablet by mouth 2 (Two) Times a Day. 60 tablet 3   • metFORMIN (GLUCOPHAGE) 500 MG tablet Take 1,000 mg by mouth 2 (Two) Times a Day With Meals.     • metoprolol tartrate (LOPRESSOR) 50 MG tablet Take 1 tablet by mouth daily. 90 tablet 3   • Multiple Vitamins-Minerals (EYE VITAMINS) capsule Take  by mouth.     • omeprazole (priLOSEC) 40 MG capsule Take 40 mg by mouth Daily.     • potassium chloride (K-DUR) 10 MEQ CR tablet Take with Lasix 30 tablet 0   • vitamin B-12 (CYANOCOBALAMIN) 1000 MCG tablet Take 1,000 mcg by mouth Daily.     • vitamin D (ERGOCALCIFEROL) 1.25 MG (84461 UT) capsule capsule Take 1 capsule by mouth Daily.     • warfarin (COUMADIN) 5 MG tablet TAKE 1 TABLET EVERY DAY 90 tablet 3     No current facility-administered medications on file prior to visit.        Eliquis [apixaban] and Soma [carisoprodol]    Past Medical History:   Diagnosis Date   • Arrhythmia    • Atrial fibrillation (CMS/HCC)    • Fibromyalgia    • Fracture of face bones due to fall, closed, initial encounter (CMS/HCC)    • Histoplasmosis    • Hyperlipidemia    • Hypertension    • Osteoarthritis    • SSS (sick sinus syndrome) (CMS/HCC)        Social History     Socioeconomic History   • Marital status:      Spouse name: Not on file   • Number of children: Not  on file   • Years of education: Not on file   • Highest education level: Not on file   Tobacco Use   • Smoking status: Never Smoker   • Smokeless tobacco: Never Used   Substance and Sexual Activity   • Alcohol use: No   • Drug use: No   • Sexual activity: Defer       Family History   Problem Relation Age of Onset   • Heart disease Mother    • Heart disease Father        Review of Systems   Constitutional: Positive for fatigue (stays fatigued).   HENT: Negative.  Negative for congestion, rhinorrhea and sore throat.    Eyes: Positive for visual disturbance (wears glasses daily).   Respiratory: Positive for chest tightness (with exertion) and shortness of breath (SOB with chest tightness). Negative for wheezing.    Cardiovascular: Positive for chest pain (Not really a paing, more like an ache, wakes up with screaming with CP), palpitations (with exertion and chest tightness, has rapid heart beat) and leg swelling (BLE edema).   Gastrointestinal: Positive for nausea (has nausea with chest tightness). Negative for abdominal pain and vomiting.   Endocrine: Negative.  Negative for cold intolerance and heat intolerance.   Genitourinary: Positive for urgency (urgent urination). Negative for difficulty urinating and frequency.   Musculoskeletal: Positive for arthralgias (joints). Negative for back pain and neck pain.   Skin: Negative.  Negative for rash and wound.   Allergic/Immunologic: Negative.  Negative for environmental allergies and food allergies.   Neurological: Positive for dizziness (dizziness with chest tightness). Negative for syncope and light-headedness.   Hematological: Bruises/bleeds easily (bruises/blds easily).   Psychiatric/Behavioral: Positive for agitation (easily agitated) and sleep disturbance (wakes up smothering/SOA). Negative for confusion. The patient is not nervous/anxious.        Objective   Vitals:    03/03/20 1528   BP: 149/77   BP Location: Left arm   Patient Position: Sitting   Pulse: 83    "  SpO2: 96%   Weight: 70.8 kg (156 lb)   Height: 167.6 cm (66\")      /77 (BP Location: Left arm, Patient Position: Sitting)   Pulse 83   Ht 167.6 cm (66\")   Wt 70.8 kg (156 lb)   SpO2 96%   BMI 25.18 kg/m²    Lab Results (most recent)     None        Physical Exam   Constitutional: She is oriented to person, place, and time. She appears well-developed and well-nourished. No distress.   HENT:   Head: Normocephalic and atraumatic.   Eyes: Pupils are equal, round, and reactive to light. Conjunctivae and EOM are normal.   Neck: Normal range of motion. Neck supple. No JVD present. No tracheal deviation present.   Cardiovascular: Normal rate, regular rhythm, normal heart sounds and intact distal pulses.   Pulmonary/Chest: Effort normal and breath sounds normal.   Abdominal: Soft. Bowel sounds are normal. She exhibits no distension and no mass. There is no tenderness. There is no rebound and no guarding.   Musculoskeletal: Normal range of motion. She exhibits no edema, tenderness or deformity.   Neurological: She is alert and oriented to person, place, and time.   Skin: Skin is warm and dry. No rash noted. No erythema. No pallor.   Psychiatric: She has a normal mood and affect. Her behavior is normal. Judgment and thought content normal.   Nursing note and vitals reviewed.        Procedure   Procedures       Assessment/Plan      Diagnosis Plan   1. Atrial fibrillation, unspecified type (CMS/HCC)  Stress Test With Myocardial Perfusion - One Day   2. Precordial pain  Stress Test With Myocardial Perfusion - One Day   3. Palpitations  Stress Test With Myocardial Perfusion - One Day   4. Essential hypertension       1.  The patient's main concern at this time is with palpitations and chest discomfort.  Her palpitations/tachycardia are now limiting activity.  This is a very uncomfortable sensation for the patient.  I would like to schedule her for an expedited interrogation of her pacemaker so that we can evaluate for " potential dysrhythmias.  We can treat appropriately once we can see what she is experiencing.    2.  With ongoing episodes of chest discomfort which are fairly significant by patient's report, I will schedule for nuclear stress test for ischemia assessment and for a stratification otherwise.    3.  Blood pressures fluctuate at home but are largely controlled, for the most part.  I would continue antihypertensive therapies without change.  She will continue to monitor blood pressures and call us for any issues.    4.  Otherwise, we will continue medical regimen.  We will proceed with the above evaluation, see her back, and recommend further at that time.  She will call immediately for any issues prior to follow-up.            Patient's Body mass index is 25.18 kg/m². BMI is above normal parameters. Recommendations include: educational material and referral to primary care.             Electronically signed by:

## 2020-03-06 ENCOUNTER — OFFICE VISIT (OUTPATIENT)
Dept: CARDIOLOGY | Facility: CLINIC | Age: 80
End: 2020-03-06

## 2020-03-06 DIAGNOSIS — I49.5 SICK SINUS SYNDROME (HCC): Primary | ICD-10-CM

## 2020-03-06 PROCEDURE — 93280 PM DEVICE PROGR EVAL DUAL: CPT | Performed by: INTERNAL MEDICINE

## 2020-03-23 ENCOUNTER — TELEPHONE (OUTPATIENT)
Dept: CARDIOLOGY | Facility: CLINIC | Age: 80
End: 2020-03-23

## 2020-03-23 NOTE — TELEPHONE ENCOUNTER
----- Message from Yanira Mcwilliams MA sent at 3/23/2020 10:20 AM EDT -----  Please have Edilson Souza PA-C review patients recent interrogation and see if patient is to make any changes? Also patient is wanting to know if she needs to proceed with stress test as scheduled on 04/01/2020. Please call patient back and let her know.       Called pt to let her know that as per verbal orders from Edilson Souza PA-C  That at this time she does not need to make any changes from interrogation.  Pt verbalize understanding. Sharlene Kaiser MA

## 2020-03-25 ENCOUNTER — LAB (OUTPATIENT)
Dept: LAB | Facility: HOSPITAL | Age: 80
End: 2020-03-25

## 2020-03-25 DIAGNOSIS — I10 ESSENTIAL HYPERTENSION: ICD-10-CM

## 2020-03-25 LAB
INR PPP: 1.95 (ref 0.9–1.1)
PROTHROMBIN TIME: 23.2 SECONDS (ref 11–15.4)

## 2020-03-25 PROCEDURE — 36415 COLL VENOUS BLD VENIPUNCTURE: CPT

## 2020-03-25 PROCEDURE — 85610 PROTHROMBIN TIME: CPT | Performed by: PHYSICIAN ASSISTANT

## 2020-03-26 ENCOUNTER — ANTICOAGULATION VISIT (OUTPATIENT)
Dept: CARDIOLOGY | Facility: CLINIC | Age: 80
End: 2020-03-26

## 2020-03-26 DIAGNOSIS — I48.0 PAROXYSMAL ATRIAL FIBRILLATION (HCC): Primary | ICD-10-CM

## 2020-03-26 NOTE — PATIENT INSTRUCTIONS
CALLED PATIENT AND NOTIFIED HER TO CONTINUE CURRENT MEDICATION REGIMEN AND RE-CHECK IN 2 WEEKS. -KAT;MACARIO

## 2020-03-26 NOTE — PROGRESS NOTES
CURRENT PT/INR RECEIVED AND REVIEWED BY SHELLI HENDRICKSON PA-C. VERBAL ORDERS GIVEN TO CONTINUE SAME DOSING AND RE-CHECK IN 2 WEEKS. -KAT;MACARIO

## 2020-03-27 ENCOUNTER — HOSPITAL ENCOUNTER (OUTPATIENT)
Dept: CARDIOLOGY | Facility: HOSPITAL | Age: 80
Discharge: HOME | End: 2020-03-27

## 2020-03-27 DIAGNOSIS — I48.91 ATRIAL FIBRILLATION, UNSPECIFIED TYPE (HCC): ICD-10-CM

## 2020-03-27 DIAGNOSIS — R00.2 PALPITATIONS: ICD-10-CM

## 2020-03-27 DIAGNOSIS — R07.2 PRECORDIAL PAIN: ICD-10-CM

## 2020-03-27 PROCEDURE — 25010000002 REGADENOSON 0.4 MG/5ML SOLUTION: Performed by: INTERNAL MEDICINE

## 2020-03-27 PROCEDURE — 93017 CV STRESS TEST TRACING ONLY: CPT

## 2020-03-27 PROCEDURE — 0 TECHNETIUM SESTAMIBI: Performed by: INTERNAL MEDICINE

## 2020-03-27 PROCEDURE — 78452 HT MUSCLE IMAGE SPECT MULT: CPT

## 2020-03-27 PROCEDURE — 78452 HT MUSCLE IMAGE SPECT MULT: CPT | Performed by: INTERNAL MEDICINE

## 2020-03-27 PROCEDURE — A9500 TC99M SESTAMIBI: HCPCS | Performed by: INTERNAL MEDICINE

## 2020-03-27 PROCEDURE — 93016 CV STRESS TEST SUPVJ ONLY: CPT | Performed by: NURSE PRACTITIONER

## 2020-03-27 PROCEDURE — 93018 CV STRESS TEST I&R ONLY: CPT | Performed by: INTERNAL MEDICINE

## 2020-03-27 RX ADMIN — TECHNETIUM TC 99M SESTAMIBI 1 DOSE: 1 INJECTION INTRAVENOUS at 10:45

## 2020-03-27 RX ADMIN — TECHNETIUM TC 99M SESTAMIBI 1 DOSE: 1 INJECTION INTRAVENOUS at 11:40

## 2020-03-27 RX ADMIN — REGADENOSON 0.4 MG: 0.08 INJECTION, SOLUTION INTRAVENOUS at 11:40

## 2020-03-31 LAB
BH CV NUCLEAR PRIOR STUDY: 3
BH CV STRESS BP STAGE 1: NORMAL
BH CV STRESS COMMENTS STAGE 1: NORMAL
BH CV STRESS DOSE REGADENOSON STAGE 1: 0.4
BH CV STRESS DURATION MIN STAGE 1: 0
BH CV STRESS DURATION SEC STAGE 1: 10
BH CV STRESS HR STAGE 1: 74
BH CV STRESS PROTOCOL 1: NORMAL
BH CV STRESS RECOVERY BP: NORMAL MMHG
BH CV STRESS RECOVERY HR: 86 BPM
BH CV STRESS STAGE 1: 1
MAXIMAL PREDICTED HEART RATE: 143 BPM
PERCENT MAX PREDICTED HR: 51.75 %
STRESS BASELINE BP: NORMAL MMHG
STRESS BASELINE HR: 65 BPM
STRESS PERCENT HR: 61 %
STRESS POST PEAK BP: NORMAL MMHG
STRESS POST PEAK HR: 74 BPM
STRESS TARGET HR: 122 BPM

## 2020-04-01 ENCOUNTER — APPOINTMENT (OUTPATIENT)
Dept: CARDIOLOGY | Facility: HOSPITAL | Age: 80
End: 2020-04-01

## 2020-04-01 DIAGNOSIS — I10 ESSENTIAL HYPERTENSION: ICD-10-CM

## 2020-04-01 RX ORDER — METOPROLOL TARTRATE 50 MG/1
75 TABLET, FILM COATED ORAL DAILY
Qty: 90 TABLET | Refills: 3 | Status: SHIPPED | OUTPATIENT
Start: 2020-04-01 | End: 2020-04-13 | Stop reason: SDUPTHER

## 2020-04-01 NOTE — TELEPHONE ENCOUNTER
Called pt to give verbal orders from Edilson Souza PA-C, pt is to take metoprolol 75 mg twice a day. If BP drops then drop lisinopril by half twice a day.  Sent in refill of metoprolol to [facility].  Informed pt that if she has any issues to call us. Pt verbalized understanding and said she would call us if she needed to.  Sharlene Kaiser MA

## 2020-04-07 ENCOUNTER — LAB (OUTPATIENT)
Dept: LAB | Facility: HOSPITAL | Age: 80
End: 2020-04-07

## 2020-04-07 DIAGNOSIS — I10 ESSENTIAL HYPERTENSION: ICD-10-CM

## 2020-04-07 LAB
INR PPP: 2.23 (ref 0.9–1.1)
PROTHROMBIN TIME: 25.7 SECONDS (ref 11–15.4)

## 2020-04-07 PROCEDURE — 36415 COLL VENOUS BLD VENIPUNCTURE: CPT

## 2020-04-07 PROCEDURE — 85610 PROTHROMBIN TIME: CPT | Performed by: PHYSICIAN ASSISTANT

## 2020-04-08 ENCOUNTER — ANTICOAGULATION VISIT (OUTPATIENT)
Dept: CARDIOLOGY | Facility: CLINIC | Age: 80
End: 2020-04-08

## 2020-04-08 ENCOUNTER — TELEPHONE (OUTPATIENT)
Dept: CARDIOLOGY | Facility: CLINIC | Age: 80
End: 2020-04-08

## 2020-04-08 LAB — INR PPP: 2.23 (ref 2–3)

## 2020-04-08 NOTE — TELEPHONE ENCOUNTER
NO DOSAGE CHANGE AND RECHECK LEVEL IN 1 MO.  PER SHELLI HENDRICKSON, PAC. VERBAL ORDERS READ BACK AND VERIFIED BY SHELLI HEDNRICKSON, PAC. PATIENT AWARE VERBALIZED OK. PH,LPN

## 2020-04-13 ENCOUNTER — TELEPHONE (OUTPATIENT)
Dept: CARDIOLOGY | Facility: CLINIC | Age: 80
End: 2020-04-13

## 2020-04-13 DIAGNOSIS — I48.0 PAROXYSMAL ATRIAL FIBRILLATION (HCC): ICD-10-CM

## 2020-04-13 DIAGNOSIS — R00.2 PALPITATIONS: Primary | ICD-10-CM

## 2020-04-13 DIAGNOSIS — I10 ESSENTIAL HYPERTENSION: ICD-10-CM

## 2020-04-13 RX ORDER — METOPROLOL TARTRATE 50 MG/1
50 TABLET, FILM COATED ORAL 2 TIMES DAILY
Qty: 60 TABLET | Refills: 5 | Status: SHIPPED | OUTPATIENT
Start: 2020-04-13 | End: 2020-12-14 | Stop reason: SDUPTHER

## 2020-04-13 NOTE — TELEPHONE ENCOUNTER
"UPON CALLING PATIENT'S  WITH INR RESULTS WIFE ANSWERED PHONE WAS SIGNIFICANTLY SHORT OF BREATH AND HAVING TROUBLE TALKING. STATES, \" I HAVE BEEN THIS WAY FOR WEEKS, IT JUST NEVER OCCURRED WHEN I WAS TALKING TO YOU GUYS\". STATES WHEN SHE DOES ANYTHING AT ALL, GETS UP OUT OF HER CHAIR, SHE WILL HAVE SEVERE SHORTNESS OF BREATH, HEART WILL \"FLIP FLOP\" AND RACE, H/R DOESN'T GO OVER 130, HAS CHEST TIGHTNESS, WILL GET WEAK, DIZZY AND R/T A-FIB. STATES SHE FELL A WHILE BACK AND WAS CLOSE TO PASSING OUT, DENIED HITTING HER HEAD. HAD HER LOOK AT HER METOPROLOL BOTTLE TO CONFIRM IT WAS TARTRATE BECAUSE SHE STATES SHE HAS ONLY BEEN TAKING 75 MG  ONCE DAILY.  GOT ON THE PHONE AND STATES SHE HAS SCREAMED OUT IN THE MIDDLE OF THE NIGHT WITH CHEST PAIN ALSO. WILL SEND ABOVE TO NILESH PAINTING FOR REVIEW AND RECOMMENDATIONS AND WILL CALL PATIENT BACK. PH,LPN    SPOKE WITH NILESH PAINTING AND HE STATES WE CAN SEE HER IN THE OFFICE AND DO LABS, CXR, EKG ETC. BUT DUE TO CURRENT PANDEMIC THERE ARE RISKS INVOLVED WITH COMING TO THE OFFICE. INCREASE METOPROLOL TO 50 MG PO BID, MONITOR B/P AND H/R. PATIENT STATES CHEST PAIN SHE HAS IS SIMILAR TO PRIOR STENTING C/P.  RELATED THIS TO MRS. OAKES AND SHE DOES NOT WANT TO COME TO THE OFFICE, STATES THEY DON'T HAVE THEIR  ANYMORE AND SHE HAS BEEN DOING MORE THUS INCREASED SX'S. SHE WAS INSTRUCTED TO CALL THE OFFICE WITH WORSENING SX'S ETC. VERBALIZED SHE WOULD. PH,LPN  "

## 2020-04-29 ENCOUNTER — OFFICE VISIT (OUTPATIENT)
Dept: CARDIOLOGY | Facility: CLINIC | Age: 80
End: 2020-04-29

## 2020-04-29 VITALS
DIASTOLIC BLOOD PRESSURE: 68 MMHG | WEIGHT: 155 LBS | BODY MASS INDEX: 25.02 KG/M2 | SYSTOLIC BLOOD PRESSURE: 131 MMHG | HEART RATE: 75 BPM

## 2020-04-29 DIAGNOSIS — I25.84 CORONARY ARTERY DISEASE DUE TO CALCIFIED CORONARY LESION: Primary | ICD-10-CM

## 2020-04-29 DIAGNOSIS — I25.10 CORONARY ARTERY DISEASE DUE TO CALCIFIED CORONARY LESION: Primary | ICD-10-CM

## 2020-04-29 DIAGNOSIS — I10 ESSENTIAL HYPERTENSION: ICD-10-CM

## 2020-04-29 DIAGNOSIS — R06.02 SHORTNESS OF BREATH: ICD-10-CM

## 2020-04-29 DIAGNOSIS — R07.2 PRECORDIAL PAIN: ICD-10-CM

## 2020-04-29 PROCEDURE — 99441 PR PHYS/QHP TELEPHONE EVALUATION 5-10 MIN: CPT | Performed by: PHYSICIAN ASSISTANT

## 2020-05-06 ENCOUNTER — OFFICE VISIT (OUTPATIENT)
Dept: CARDIOLOGY | Facility: CLINIC | Age: 80
End: 2020-05-06

## 2020-05-06 VITALS
BODY MASS INDEX: 25.23 KG/M2 | HEIGHT: 66 IN | SYSTOLIC BLOOD PRESSURE: 132 MMHG | HEART RATE: 70 BPM | OXYGEN SATURATION: 97 % | WEIGHT: 157 LBS | DIASTOLIC BLOOD PRESSURE: 63 MMHG

## 2020-05-06 DIAGNOSIS — I25.10 CORONARY ARTERY DISEASE DUE TO CALCIFIED CORONARY LESION: Primary | ICD-10-CM

## 2020-05-06 DIAGNOSIS — I25.84 CORONARY ARTERY DISEASE DUE TO CALCIFIED CORONARY LESION: Primary | ICD-10-CM

## 2020-05-06 DIAGNOSIS — I10 ESSENTIAL HYPERTENSION: ICD-10-CM

## 2020-05-06 DIAGNOSIS — R06.02 SHORTNESS OF BREATH: ICD-10-CM

## 2020-05-06 PROCEDURE — 99442 PR PHYS/QHP TELEPHONE EVALUATION 11-20 MIN: CPT | Performed by: PHYSICIAN ASSISTANT

## 2020-05-06 RX ORDER — MELATONIN
1000 DAILY
COMMUNITY
End: 2021-08-06

## 2020-05-06 NOTE — PROGRESS NOTES
Problem list     Subjective   Nanci Pritchard is a 80 y.o. female     Chief Complaint   Patient presents with   • Coronary Artery Disease     Telephone visit for a follow up    • Atrial Fibrillation   • Hypertension   Problem List:  1. Sick sinus syndrome.  1.1. Greater than 3 second pause noted per event monitor.  1.2. The patient was subsequently admitted to [facility] with placement of dual-chamber pacemaker per Dr. Gutierrez.  1.3. Apparent lead dislodgment with subsequent fixation of the leads the following day post pacemaker placement.  2. Preserved systolic function  3. Chest Discomfort  3.1 Low risk stress test, 09/2016.  3.2 Stress 7/2019 possible anterolateral wall ischemia  3.3 [facility] 8/2019 stenting of LAD and RCA  4. Hypertension  5. Atrial Fibrillation  6. Fibromyalgia    HPI  You have chosen to receive care through a telephone visit. Do you consent to use a telephone visit for your medical care today? Yes      The patient is a 80-year-old white female who presents back for evaluation via transtelephonic communication.  We had discussed this patient's case last week.  We had interviewed her via transtelephonic interrogation at that time.  At that time, she had crescendo chest pain, dyspnea, weakness, fatigue, and multiple issues otherwise which she represented as anginal equivalent symptoms.  I had recommended consideration for catheterization.  Because of her concern of coronavirus pandemic, the patient wanted to hold off on any further testing.  We wanted to talk to with her again this week.  She is present via phone in that setting.  She tells me that almost everyone of her symptoms experienced last week have now resolved.  Over the past 5 days, she is felt better than she has in years.  Her chest pain is resolved.  Her dyspnea is stable and at baseline.  She has no failure or dysrhythmic symptoms.  The patient has stable fatigue and weakness.  These are chronic symptoms and  nonproblematic for her.  She reports normotensive status.  She is tolerating medications without complication.  The patient has no further complaints.      Current Outpatient Medications on File Prior to Visit   Medication Sig Dispense Refill   • amLODIPine (NORVASC) 5 MG tablet Take 1 tablet by mouth daily. 90 tablet 3   • Calcium Carbonate (CALTRATE 600) 1500 (600 Ca) MG tablet Take 600 mg by mouth Daily.     • cholecalciferol (VITAMIN D3) 25 MCG (1000 UT) tablet Take 1,000 Units by mouth Daily.     • clopidogrel (PLAVIX) 75 MG tablet Take 1 tablet by mouth Daily. 90 tablet 3   • DULoxetine (CYMBALTA) 60 MG capsule Take 60 mg by mouth Daily.     • Evolocumab with Infusor (REPATHA PUSHTRONEX SYSTEM) 420 MG/3.5ML solution cartridge Inject 1 Device under the skin into the appropriate area as directed Every 30 (Thirty) Days. 1 cartridge. 11   • furosemide (LASIX) 20 MG tablet Take 1 tablet by mouth Daily As Needed (For edema). Take 1 tablet daily for 3 pound weight gain 60 tablet 3   • glimepiride (AMARYL) 4 MG tablet Take 4 mg by mouth Daily.     • lisinopril (PRINIVIL,ZESTRIL) 20 MG tablet Take 1 tablet by mouth 2 (Two) Times a Day. 60 tablet 3   • metFORMIN (GLUCOPHAGE) 500 MG tablet Take 1,000 mg by mouth 2 (Two) Times a Day With Meals.     • metoprolol tartrate (LOPRESSOR) 50 MG tablet Take 1 tablet by mouth 2 (Two) Times a Day. 60 tablet 5   • Multiple Vitamins-Minerals (EYE VITAMINS) capsule Take  by mouth.     • omeprazole (priLOSEC) 40 MG capsule Take 40 mg by mouth Daily.     • potassium chloride (K-DUR) 10 MEQ CR tablet Take with Lasix 30 tablet 0   • vitamin B-12 (CYANOCOBALAMIN) 1000 MCG tablet Take 1,000 mcg by mouth Daily.     • warfarin (COUMADIN) 5 MG tablet TAKE 1 TABLET EVERY DAY (Patient taking differently: Take 4 mg by mouth Every Night.) 90 tablet 3   • vitamin D (ERGOCALCIFEROL) 1.25 MG (43516 UT) capsule capsule Take 1 capsule by mouth Daily.       No current facility-administered medications  on file prior to visit.        Eliquis [apixaban] and Soma [carisoprodol]    Past Medical History:   Diagnosis Date   • Arrhythmia    • Atrial fibrillation (CMS/[facility])    • Fibromyalgia    • Fracture of face bones due to fall, closed, initial encounter (CMS/[facility])    • Histoplasmosis    • Hyperlipidemia    • Hypertension    • Osteoarthritis    • SSS (sick sinus syndrome) (CMS/[facility])        Social History     Socioeconomic History   • Marital status:      Spouse name: Not on file   • Number of children: Not on file   • Years of education: Not on file   • Highest education level: Not on file   Tobacco Use   • Smoking status: Never Smoker   • Smokeless tobacco: Never Used   Substance and Sexual Activity   • Alcohol use: No   • Drug use: No   • Sexual activity: Defer       Family History   Problem Relation Age of Onset   • Heart disease Mother    • Heart disease Father        Review of Systems   Constitutional: Positive for fatigue (stays tired ). Negative for chills and fever.   HENT: Positive for voice change (hoarseness at times with chest pain ). Negative for congestion, rhinorrhea and sore throat.    Eyes: Positive for visual disturbance (reading glasses ).   Respiratory: Positive for chest tightness and shortness of breath.    Cardiovascular: Positive for chest pain, palpitations (races ) and leg swelling (Occasional LE edema ).   Gastrointestinal: Negative.  Negative for abdominal pain, blood in stool, nausea and vomiting.   Endocrine: Positive for heat intolerance. Negative for cold intolerance.   Genitourinary: Negative.  Negative for dysuria, frequency, hematuria and urgency.   Musculoskeletal: Negative.  Negative for arthralgias and back pain.   Skin: Negative.  Negative for rash and wound.   Allergic/Immunologic: Negative.  Negative for environmental allergies and food allergies.   Neurological: Positive for light-headedness (rarely ). Negative for dizziness and weakness.   Hematological: Bruises/bleeds  "easily.   Psychiatric/Behavioral: Negative.  Negative for agitation, confusion and sleep disturbance (denies waking with smothering ). The patient is not nervous/anxious.        Objective   Vitals:    05/06/20 1407   BP: 132/63   BP Location: Left arm   Patient Position: Sitting   Pulse: 70   SpO2: 97%   Weight: 71.2 kg (157 lb)   Height: 167.6 cm (66\")      /63 (BP Location: Left arm, Patient Position: Sitting) Comment: pt reported  Pulse 70 Comment: pt reported  Ht 167.6 cm (66\")   Wt 71.2 kg (157 lb) Comment: pt reported  SpO2 97% Comment: pt reported  BMI 25.34 kg/m²    Lab Results (most recent)     None        Physical Exam      Procedure   Procedures       Assessment/Plan      Diagnosis Plan   1. Coronary artery disease due to calcified coronary lesion     2. Shortness of breath     3. Essential hypertension         1.  At this time, the patient's previously described symptoms of crescendo chest pain, severe dyspnea, marked weakness and fatigue, and issues otherwise have largely resolved.  Over the past 5 days, the patient reports that she is felt better than she has in years.  We discussed consideration for catheterization given severity of symptoms.  At this time, she feels so well and reports that she is markedly improved, I do not feel further work-up would be warranted.  I feel we can treat her with medicines alone.    2.  For breakthrough or continued symptoms, the patient would call to us immediately.  For recurrence of chest discomfort, progression of dyspnea, or recurrence of symptoms otherwise as above, she will call to us immediately or proceed on to the clinic.  For unstable symptoms, she will proceed to the emergency room.    3.  I want to hear back from her in 1 month and discuss symptoms with her at that time.  If she has recurrence of symptoms before then, I will to see her immediately.  For now, I will continue medical regimen without change as she appears to be well treated with " that therapy.    4.  Blood pressures appear to be fairly well controlled on current antihypertensive regimen.  We will continue those without change as well.  Further upon follow-up as above.  She will call for any issues otherwise.         Nanci Pritchard  reports that she has never smoked. She has never used smokeless tobacco..         Patient's Body mass index is 25.34 kg/m². BMI is within normal parameters. No follow-up required..     Advance Care Planning   ACP discussion was declined by the patient. Patient has an advance directive (not in EMR), copy requested.      This visit has been rescheduled as a phone visit to comply with patient safety concerns in accordance with CDC recommendations. Total time of discussion was 12 minutes.        Electronically signed by:

## 2020-05-07 ENCOUNTER — LAB (OUTPATIENT)
Dept: LAB | Facility: HOSPITAL | Age: 80
End: 2020-05-07

## 2020-05-07 DIAGNOSIS — I10 ESSENTIAL HYPERTENSION: ICD-10-CM

## 2020-05-07 PROCEDURE — 36415 COLL VENOUS BLD VENIPUNCTURE: CPT

## 2020-05-07 PROCEDURE — 85610 PROTHROMBIN TIME: CPT | Performed by: PHYSICIAN ASSISTANT

## 2020-05-08 ENCOUNTER — ANTICOAGULATION VISIT (OUTPATIENT)
Dept: CARDIOLOGY | Facility: CLINIC | Age: 80
End: 2020-05-08

## 2020-05-08 DIAGNOSIS — I48.0 PAROXYSMAL ATRIAL FIBRILLATION (HCC): Primary | ICD-10-CM

## 2020-05-08 LAB
INR PPP: 2.12 (ref 0.9–1.1)
PROTHROMBIN TIME: 24.8 SECONDS (ref 11–15.4)

## 2020-05-08 NOTE — PATIENT INSTRUCTIONS
Called and notified patient to continue current medication regimen and re-check in 3-4 weeks. Patient verbalized understanding and had no further questions at this time. -KAT;MACARIO

## 2020-05-08 NOTE — PROGRESS NOTES
Current at home PT/INR results received and reviewed by Edilson Souza PA-C. Verbal orders given to continue current dosing and re-check in 3-4 weeks. -KAT;MACARIO

## 2020-05-11 ENCOUNTER — TELEPHONE (OUTPATIENT)
Dept: CARDIOLOGY | Facility: CLINIC | Age: 80
End: 2020-05-11

## 2020-05-11 DIAGNOSIS — I10 ESSENTIAL HYPERTENSION: ICD-10-CM

## 2020-05-11 DIAGNOSIS — R06.02 SHORTNESS OF BREATH: ICD-10-CM

## 2020-05-11 DIAGNOSIS — I48.0 PAROXYSMAL ATRIAL FIBRILLATION (HCC): ICD-10-CM

## 2020-05-11 DIAGNOSIS — R07.2 PRECORDIAL PAIN: Primary | ICD-10-CM

## 2020-05-11 NOTE — TELEPHONE ENCOUNTER
Patient came into office and was given cath instructions. Labs were also drawn. SHAHAB EDMOND    Patient notified that heart cath is scheduled for 5/20/20. She will come in tomorrow or Thursday and get labs drawn and then will come upstairs to get cath instructions. SHAHAB EDMOND    Per verbal order from RAYA Harris patient does need to proceed with left heart cath. Patient will need to stop Coumadin 5 days prior to the procedure. Will need to start Lovenox injections 3 days prior to procedure and continue for 3 days after the procedure. She will also start back on Coumadin immediately after procedure. INR to be checked on 3rd day after procedure and will decide at that time if any additional Lovenox needs to be given.  Patient will come into office for bmp, cbc and then get cath instructions at that time. Awaiting cath appt to let patient know that information.  SHAHAB EDMOND      Called and spoke to patient, states she was talking to her grandson on the phone this morning and felt a tightness all across her chest with increased shortness of air. Per last office note from Edilson Souza, patient was doing well at that time and didn't want to proceed with a heart cath, but now that she is having symptoms again she is okay with proceeding with the cath if Edilson feels it would be necessary. She will go to ER if symptoms worsen, but otherwise I will call her tomorrow after speaking with Edilson. She verbalized understanding.  SHAHAB EDMOND    ----- Message from Charleen Jorgensen LPN sent at 5/11/2020  4:35 PM EDT -----  Contact: PATIENT  CALLED HOME NUMBER WITH HUSBANDS INR AND WIFE STATED SHE WAS HAVING ANOTHER BAD EPISODE WITH HER HEART. TOLD HER I WOULD HAVE YOU CALL HER BACK FOR FURTHER DETAILS.

## 2020-05-13 ENCOUNTER — TELEPHONE (OUTPATIENT)
Dept: CARDIOLOGY | Facility: CLINIC | Age: 80
End: 2020-05-13

## 2020-05-13 ENCOUNTER — LAB (OUTPATIENT)
Dept: LAB | Facility: HOSPITAL | Age: 80
End: 2020-05-13

## 2020-05-13 DIAGNOSIS — R06.02 SHORTNESS OF BREATH: ICD-10-CM

## 2020-05-13 DIAGNOSIS — I10 ESSENTIAL HYPERTENSION: ICD-10-CM

## 2020-05-13 LAB
ANION GAP SERPL CALCULATED.3IONS-SCNC: 19.1 MMOL/L (ref 5–15)
BASOPHILS # BLD AUTO: 0.06 10*3/MM3 (ref 0–0.2)
BASOPHILS NFR BLD AUTO: 1.1 % (ref 0–1.5)
BUN BLD-MCNC: 15 MG/DL (ref 8–23)
BUN/CREAT SERPL: 22.4 (ref 7–25)
CALCIUM SPEC-SCNC: 9.7 MG/DL (ref 8.6–10.5)
CHLORIDE SERPL-SCNC: 100 MMOL/L (ref 98–107)
CO2 SERPL-SCNC: 21.9 MMOL/L (ref 22–29)
CREAT BLD-MCNC: 0.67 MG/DL (ref 0.57–1)
DEPRECATED RDW RBC AUTO: 55.7 FL (ref 37–54)
EOSINOPHIL # BLD AUTO: 0.3 10*3/MM3 (ref 0–0.4)
EOSINOPHIL NFR BLD AUTO: 5.4 % (ref 0.3–6.2)
ERYTHROCYTE [DISTWIDTH] IN BLOOD BY AUTOMATED COUNT: 16.1 % (ref 12.3–15.4)
GFR SERPL CREATININE-BSD FRML MDRD: 85 ML/MIN/1.73
GLUCOSE BLD-MCNC: 119 MG/DL (ref 65–99)
HCT VFR BLD AUTO: 40.1 % (ref 34–46.6)
HGB BLD-MCNC: 12.2 G/DL (ref 12–15.9)
IMM GRANULOCYTES # BLD AUTO: 0.02 10*3/MM3 (ref 0–0.05)
IMM GRANULOCYTES NFR BLD AUTO: 0.4 % (ref 0–0.5)
LYMPHOCYTES # BLD AUTO: 1.68 10*3/MM3 (ref 0.7–3.1)
LYMPHOCYTES NFR BLD AUTO: 30.5 % (ref 19.6–45.3)
MCH RBC QN AUTO: 28.6 PG (ref 26.6–33)
MCHC RBC AUTO-ENTMCNC: 30.4 G/DL (ref 31.5–35.7)
MCV RBC AUTO: 93.9 FL (ref 79–97)
MONOCYTES # BLD AUTO: 0.68 10*3/MM3 (ref 0.1–0.9)
MONOCYTES NFR BLD AUTO: 12.3 % (ref 5–12)
NEUTROPHILS # BLD AUTO: 2.77 10*3/MM3 (ref 1.7–7)
NEUTROPHILS NFR BLD AUTO: 50.3 % (ref 42.7–76)
NRBC BLD AUTO-RTO: 0 /100 WBC (ref 0–0.2)
PLATELET # BLD AUTO: 208 10*3/MM3 (ref 140–450)
PMV BLD AUTO: 10.3 FL (ref 6–12)
POTASSIUM BLD-SCNC: 4.2 MMOL/L (ref 3.5–5.2)
RBC # BLD AUTO: 4.27 10*6/MM3 (ref 3.77–5.28)
SODIUM BLD-SCNC: 141 MMOL/L (ref 136–145)
WBC NRBC COR # BLD: 5.51 10*3/MM3 (ref 3.4–10.8)

## 2020-05-13 PROCEDURE — 36415 COLL VENOUS BLD VENIPUNCTURE: CPT

## 2020-05-13 PROCEDURE — 85025 COMPLETE CBC W/AUTO DIFF WBC: CPT | Performed by: PHYSICIAN ASSISTANT

## 2020-05-13 PROCEDURE — 80048 BASIC METABOLIC PNL TOTAL CA: CPT | Performed by: PHYSICIAN ASSISTANT

## 2020-05-13 NOTE — TELEPHONE ENCOUNTER
Nanci Miller Key: [id number] - PA Case ID: [id number] - Rx #: [id number] Need help? Call us at [phone]   Outcome   Approvedtoday   PA Case: [id number], Status: Approved, Coverage Starts on: 1/1/2020 12:00:00 AM, Coverage Ends on: 12/31/2020 12:00:00 AM. Questions? Contact [phone].      Waiting for faxed approval.

## 2020-05-13 NOTE — TELEPHONE ENCOUNTER
Received PA request for pt's Lovenox, initiated PA via covermymeds:    Dx: I48.91 and Z79.01    Please provide diagnosis or ICD Code:    Bridge therapy with warfarin atrial fibrillation     Is the request for one syringe two times per day dosing?   Yes   Is the requested dose medically necessary for the patient?   Yes       Nanci Pritchard Key: [id number] - PA Case ID: [id number] - Rx #: [id number] Need help? Call us at [phone]   Status   Sent to [employer]   DrugEnoxaparin Sodium 80MG/0.8ML solution   [facility]   Original Claim [id number] USE PAC [id number] FOR DISASTER CLAIMS      Pt must start Lovenox on the 17th, need response by [facility] or [facility] will have to be delayed.

## 2020-05-19 ENCOUNTER — TELEPHONE (OUTPATIENT)
Dept: CARDIOLOGY | Facility: CLINIC | Age: 80
End: 2020-05-19

## 2020-05-19 NOTE — TELEPHONE ENCOUNTER
Per verbal order from Edilson, patient notified to continue injections and just monitor for other abnormal bruising or bleeding not at site of injection. Patient verbalized understanding.  SHAHAB EDMOND    Spoke to patient, states she is getting a bruise that is 2-3 inches across at the sites of her Lovenox injections. Denies any pain or discomfort at the site. Denies any other abnormal bruising or bleeding. Did confirm that she has stopped her Warfarin for the heart cath. Will speak to RAYA Harris and call patient back.  SHAHAB EDMOND    ----- Message from Zandra Torrez sent at 5/19/2020  9:11 AM EDT -----  Pt has several questions regarding injections, at the injection site leaving bruises, please call pt to discuss at number provided, [phone]

## 2020-05-20 ENCOUNTER — OUTSIDE FACILITY SERVICE (OUTPATIENT)
Dept: CARDIOLOGY | Facility: CLINIC | Age: 80
End: 2020-05-20

## 2020-05-20 DIAGNOSIS — R06.02 SHORTNESS OF BREATH: ICD-10-CM

## 2020-05-20 DIAGNOSIS — R07.2 PRECORDIAL PAIN: ICD-10-CM

## 2020-05-20 DIAGNOSIS — I10 ESSENTIAL HYPERTENSION: ICD-10-CM

## 2020-05-20 PROCEDURE — 93458 L HRT ARTERY/VENTRICLE ANGIO: CPT | Performed by: INTERNAL MEDICINE

## 2020-05-20 PROCEDURE — 92928 PRQ TCAT PLMT NTRAC ST 1 LES: CPT | Performed by: INTERNAL MEDICINE

## 2020-05-20 PROCEDURE — 92978 ENDOLUMINL IVUS OCT C 1ST: CPT | Performed by: INTERNAL MEDICINE

## 2020-06-11 ENCOUNTER — OFFICE VISIT (OUTPATIENT)
Dept: CARDIOLOGY | Facility: CLINIC | Age: 80
End: 2020-06-11

## 2020-06-11 VITALS
BODY MASS INDEX: 25.55 KG/M2 | WEIGHT: 159 LBS | SYSTOLIC BLOOD PRESSURE: 140 MMHG | HEART RATE: 76 BPM | DIASTOLIC BLOOD PRESSURE: 78 MMHG | HEIGHT: 66 IN

## 2020-06-11 DIAGNOSIS — R06.02 SHORTNESS OF BREATH: Primary | ICD-10-CM

## 2020-06-11 DIAGNOSIS — R73.09 ELEVATED GLUCOSE: ICD-10-CM

## 2020-06-11 DIAGNOSIS — I10 ESSENTIAL HYPERTENSION: ICD-10-CM

## 2020-06-11 DIAGNOSIS — I25.10 CORONARY ARTERY DISEASE DUE TO CALCIFIED CORONARY LESION: ICD-10-CM

## 2020-06-11 DIAGNOSIS — I25.84 CORONARY ARTERY DISEASE DUE TO CALCIFIED CORONARY LESION: ICD-10-CM

## 2020-06-11 DIAGNOSIS — E78.2 MIXED HYPERLIPIDEMIA: ICD-10-CM

## 2020-06-11 DIAGNOSIS — R00.2 PALPITATIONS: ICD-10-CM

## 2020-06-11 DIAGNOSIS — R53.83 FATIGUE, UNSPECIFIED TYPE: ICD-10-CM

## 2020-06-11 DIAGNOSIS — R07.89 CHEST TIGHTNESS: ICD-10-CM

## 2020-06-11 PROCEDURE — 99214 OFFICE O/P EST MOD 30 MIN: CPT | Performed by: NURSE PRACTITIONER

## 2020-06-11 NOTE — PROGRESS NOTES
Subjective     Nanci Pritchard is a 80 y.o. female who presents to day for Coronary Artery Disease and Fatigue.    CHIEF COMPLIANT  Chief Complaint   Patient presents with   • Coronary Artery Disease   • Fatigue       Active Problems:  Problem List Items Addressed This Visit        Cardiovascular and Mediastinum    Mixed hyperlipidemia    Relevant Orders    Lipid Panel (Completed)    Comprehensive Metabolic Panel (Completed)    Essential hypertension    Relevant Orders    Comprehensive Metabolic Panel (Completed)    Palpitations    Relevant Orders    Comprehensive Metabolic Panel (Completed)       Respiratory    Shortness of breath - Primary    Relevant Orders    Comprehensive Metabolic Panel (Completed)    CBC & Differential (Completed)      Other Visit Diagnoses     Fatigue, unspecified type        Relevant Orders    Comprehensive Metabolic Panel (Completed)    CBC & Differential (Completed)    Chest tightness        Relevant Orders    Comprehensive Metabolic Panel (Completed)    CBC & Differential (Completed)    Coronary artery disease due to calcified coronary lesion        Relevant Orders    Comprehensive Metabolic Panel (Completed)    Elevated glucose        Relevant Orders    Hemoglobin A1c (Completed)      Problem List:  1. Sick sinus syndrome.  1.1. Greater than 3 second pause noted per event monitor.  1.2. The patient was subsequently admitted to  with placement of dual-chamber pacemaker per Dr. Gutierrez.  1.3. Apparent lead dislodgment with subsequent fixation of the leads the following day post pacemaker placement.  2. Preserved systolic function  3. Chest Discomfort  3.1 Low risk stress test, 09/2016.  3.2 Stress 7/2019 possible anterolateral wall ischemia  3.3 [facility] 8/2019 stenting of LAD and RCA  3.4 left heart catheterization 5/20: Left main normal, LAD subtotal stenosis, ramus 2 mm with 50 to 70%, diagonal 270% proximal, circumflex diffuse disease, RCA at the acute  margin had 70%, PDA was diffuse disease, posterior lateral had 50%, 65% EF.  2 stents to the LAD.  4. Hypertension  5. Atrial Fibrillation  6. Fibromyalgia    HPI  Coronary Artery Disease   Symptoms include chest tightness, leg swelling, palpitations (with activity) and shortness of breath. Pertinent negatives include no chest pain or dizziness.   Fatigue   Associated symptoms include fatigue. Pertinent negatives include no abdominal pain, arthralgias, chest pain, chills, congestion, fever, nausea, neck pain, rash, sore throat or vomiting.   Ms. Pritchard is a 80-year-old female who is being followed up today for known coronary artery disease status post left heart cath with percutaneous coronary intervention which there he received 2 stents to the left anterior descending artery for subtotal stenosis in the previous stent.  Patient denies any complications at the right radial access site.  She denies any numbness tingling in the hand.  Patient does report some shortness of air that is worse with activity and she gets tired very easily.  She says that times her heart rate drops and she develops tightens us in her chest.  Patient does report palpitations which she describes as fluttering and is worse with activity.  She does have occasional shortness of breath with it.  Patient had a negative sleep study for sleep apnea.  Patient stated that she only felt a small amount of improvement after the stent placement.  Patient denies any syncope, PND, orthopnea, or other neurological changes.    PRIOR MEDS  Current Outpatient Medications on File Prior to Visit   Medication Sig Dispense Refill   • amLODIPine (NORVASC) 5 MG tablet Take 1 tablet by mouth daily. 90 tablet 3   • Calcium Carbonate (CALTRATE 600) 1500 (600 Ca) MG tablet Take 600 mg by mouth Daily.     • cholecalciferol (VITAMIN D3) 25 MCG (1000 UT) tablet Take 1,000 Units by mouth Daily.     • clopidogrel (PLAVIX) 75 MG tablet Take 1 tablet by mouth Daily. 90 tablet  3   • DULoxetine (CYMBALTA) 60 MG capsule Take 60 mg by mouth Daily.     • Evolocumab with Infusor (REPATHA PUSHTRONEX SYSTEM) 420 MG/3.5ML solution cartridge Inject 1 Device under the skin into the appropriate area as directed Every 30 (Thirty) Days. 1 cartridge. 11   • furosemide (LASIX) 20 MG tablet Take 1 tablet by mouth Daily As Needed (For edema). Take 1 tablet daily for 3 pound weight gain 60 tablet 3   • glimepiride (AMARYL) 4 MG tablet Take 4 mg by mouth Daily.     • lisinopril (PRINIVIL,ZESTRIL) 20 MG tablet Take 1 tablet by mouth 2 (Two) Times a Day. 60 tablet 3   • metFORMIN (GLUCOPHAGE) 500 MG tablet Take 1,000 mg by mouth 2 (Two) Times a Day With Meals.     • metoprolol tartrate (LOPRESSOR) 50 MG tablet Take 1 tablet by mouth 2 (Two) Times a Day. 60 tablet 5   • Multiple Vitamins-Minerals (EYE VITAMINS) capsule Take  by mouth.     • omeprazole (priLOSEC) 40 MG capsule Take 40 mg by mouth Daily.     • Potassium 99 MG tablet Take  by mouth.     • vitamin B-12 (CYANOCOBALAMIN) 1000 MCG tablet Take 1,000 mcg by mouth Daily.     • vitamin D (ERGOCALCIFEROL) 1.25 MG (53443 UT) capsule capsule Take 1 capsule by mouth Daily.     • warfarin (COUMADIN) 5 MG tablet TAKE 1 TABLET EVERY DAY (Patient taking differently: Take 4 mg by mouth Every Night.) 90 tablet 3   • enoxaparin (LOVENOX) 80 MG/0.8ML solution syringe One injection bid x 7 days starting 5/17/20 14 syringe 0   • [DISCONTINUED] potassium chloride (K-DUR) 10 MEQ CR tablet Take with Lasix 30 tablet 0     No current facility-administered medications on file prior to visit.        ALLERGIES  Eliquis [apixaban] and Soma [carisoprodol]    HISTORY  Past Medical History:   Diagnosis Date   • Arrhythmia    • Atrial fibrillation (CMS/HCC)    • Encounter for monitoring Coumadin therapy    • Fibromyalgia    • Fracture of face bones due to fall, closed, initial encounter (CMS/HCC)    • Histoplasmosis    • Hyperlipidemia    • Hypertension    • Osteoarthritis    •  "SSS (sick sinus syndrome) (CMS/[facility])        Social History     Socioeconomic History   • Marital status:      Spouse name: Not on file   • Number of children: Not on file   • Years of education: Not on file   • Highest education level: Not on file   Tobacco Use   • Smoking status: Never Smoker   • Smokeless tobacco: Never Used   Substance and Sexual Activity   • Alcohol use: No   • Drug use: No   • Sexual activity: Defer       Family History   Problem Relation Age of Onset   • Heart disease Mother    • Heart disease Father        Review of Systems   Constitutional: Positive for fatigue. Negative for chills and fever.   HENT: Negative for congestion, sinus pressure and sore throat.    Eyes: Positive for visual disturbance (glasses).   Respiratory: Positive for chest tightness and shortness of breath.    Cardiovascular: Positive for palpitations (with activity) and leg swelling. Negative for chest pain.   Gastrointestinal: Negative.  Negative for abdominal pain, blood in stool, constipation, diarrhea, nausea and vomiting.   Endocrine: Negative.  Negative for cold intolerance and heat intolerance.   Genitourinary: Negative.  Negative for dysuria, frequency and hematuria.   Musculoskeletal: Negative.  Negative for arthralgias, back pain and neck pain.   Skin: Negative.  Negative for rash and wound.   Allergic/Immunologic: Negative.  Negative for environmental allergies and food allergies.   Neurological: Negative.  Negative for dizziness, syncope and light-headedness.   Hematological: Bruises/bleeds easily.   Psychiatric/Behavioral: Positive for sleep disturbance (wakes at times with soa, denies cp).       Objective     VITALS: /78 (BP Location: Left arm)   Pulse 76   Ht 167.6 cm (66\")   Wt 72.1 kg (159 lb)   BMI 25.66 kg/m²     LABS:   Lab Results (most recent)     None          IMAGING:   No Images in the past 120 days found..    EXAM:  Physical Exam   Constitutional: She is oriented to person, place, " and time. She appears well-developed and well-nourished.   HENT:   Head: Normocephalic and atraumatic.   Eyes: Pupils are equal, round, and reactive to light. EOM are normal.   Neck: Trachea normal and phonation normal. Neck supple. No JVD present. Carotid bruit is not present. No thyroid mass present.   Cardiovascular: Normal rate, regular rhythm, normal heart sounds and intact distal pulses. Exam reveals no gallop and no friction rub.   No murmur heard.  Pulses:       Radial pulses are 2+ on the right side, and 2+ on the left side.        Posterior tibial pulses are 2+ on the right side, and 2+ on the left side.   Pulmonary/Chest: Effort normal and breath sounds normal. No respiratory distress. She has no wheezes. She has no rales.   Abdominal: Soft. Bowel sounds are normal. She exhibits no distension and no abdominal bruit. There is no tenderness.   Musculoskeletal: Normal range of motion. She exhibits no edema.   Neurological: She is alert and oriented to person, place, and time. She has normal strength. No cranial nerve deficit or sensory deficit.   Skin: Skin is warm, dry and intact. Capillary refill takes less than 2 seconds. No rash noted.   Psychiatric: She has a normal mood and affect. Her speech is normal and behavior is normal. Judgment and thought content normal. Cognition and memory are normal.   Nursing note and vitals reviewed.      Procedure   Procedures       Assessment/Plan    Diagnosis Plan   1. Shortness of breath  Comprehensive Metabolic Panel    CBC & Differential   2. Fatigue, unspecified type  Comprehensive Metabolic Panel    CBC & Differential   3. Chest tightness  Comprehensive Metabolic Panel    CBC & Differential   4. Palpitations  Comprehensive Metabolic Panel   5. Coronary artery disease due to calcified coronary lesion  Comprehensive Metabolic Panel   6. Essential hypertension  Comprehensive Metabolic Panel   7. Mixed hyperlipidemia  Lipid Panel    Comprehensive Metabolic Panel   8.  Elevated glucose  Hemoglobin A1c   1.  Patient does report continued chest tightness and shortness of air and palpitations status post left heart catheterization with stent placement to subtotal stenosis in the LAD x2.  2.  Patient will be ordered a CBC, CMP, lipid panel, hemoglobin A1c to further evaluate patient.  For potential causes of the palpitations and other symptoms.  3.  Patient's blood pressure is mildly elevated today at 140/78 heart rate 69.  Patient advised to monitor his blood report any significant highs or lows.  4.  Right radial cath site is without hematoma or exudate.  Right radial pulse is palpable proximal and distal to the insertion site.  Patient denies any loss of sensation, numbness, or tingling.  Capillary refill within 2 seconds.  5.   Did discuss initiation of isosorbide with the patient for her persistent chest tightness.  Patient also have sublingual nitroglycerin which she can take for her chest pain.  6.Informed of signs and symptoms of ACS and advised to seek emergent treatment for any new worsening symptoms.  Patient also advised sooner follow-up as needed.  Also advised to follow-up with family doctor as needed  This note is dictated utilizing voice recognition software.  Although this record has been proof read, transcriptional errors may still be present. If questions occur regarding the content of this record please do not hesitate to call our office.    Return in about 3 months (around 9/11/2020), or if symptoms worsen or fail to improve, for With [place] Christine was seen today for coronary artery disease and fatigue.    Diagnoses and all orders for this visit:    Shortness of breath  -     Comprehensive Metabolic Panel; Future  -     CBC & Differential; Future    Fatigue, unspecified type  -     Comprehensive Metabolic Panel; Future  -     CBC & Differential; Future    Chest tightness  -     Comprehensive Metabolic Panel; Future  -     CBC & Differential;  Future    Palpitations  -     Comprehensive Metabolic Panel; Future    Coronary artery disease due to calcified coronary lesion  -     Comprehensive Metabolic Panel; Future    Essential hypertension  -     Comprehensive Metabolic Panel; Future    Mixed hyperlipidemia  -     Lipid Panel; Future  -     Comprehensive Metabolic Panel; Future    Elevated glucose  -     Hemoglobin A1c; Future        Nanci Pritchard  reports that she has never smoked. She has never used smokeless tobacco..      Patient's Body mass index is 25.66 kg/m². BMI is within normal parameters. No follow-up required..      Advance Care Planning   ACP discussion was held with the patient during this visit. Patient has an advance directive in EMR which is still valid.            MEDS ORDERED DURING VISIT:  No orders of the defined types were placed in this encounter.          This document has been electronically signed by Pancho Lazcano Jr., APRN  June 17, 2020 22:22

## 2020-06-12 DIAGNOSIS — R07.2 PRECORDIAL PAIN: Primary | ICD-10-CM

## 2020-06-12 RX ORDER — NITROGLYCERIN 0.4 MG/1
TABLET SUBLINGUAL
Qty: 30 TABLET | Refills: 5 | Status: SHIPPED | OUTPATIENT
Start: 2020-06-12

## 2020-06-16 ENCOUNTER — LAB (OUTPATIENT)
Dept: LAB | Facility: HOSPITAL | Age: 80
End: 2020-06-16

## 2020-06-16 ENCOUNTER — TELEPHONE (OUTPATIENT)
Dept: CARDIOLOGY | Facility: CLINIC | Age: 80
End: 2020-06-16

## 2020-06-16 DIAGNOSIS — R53.83 FATIGUE, UNSPECIFIED TYPE: ICD-10-CM

## 2020-06-16 DIAGNOSIS — I48.0 PAROXYSMAL ATRIAL FIBRILLATION (HCC): ICD-10-CM

## 2020-06-16 DIAGNOSIS — R73.09 ELEVATED GLUCOSE: ICD-10-CM

## 2020-06-16 DIAGNOSIS — I10 ESSENTIAL HYPERTENSION: ICD-10-CM

## 2020-06-16 DIAGNOSIS — I48.0 PAROXYSMAL ATRIAL FIBRILLATION (HCC): Primary | ICD-10-CM

## 2020-06-16 DIAGNOSIS — R00.2 PALPITATIONS: ICD-10-CM

## 2020-06-16 DIAGNOSIS — I25.84 CORONARY ARTERY DISEASE DUE TO CALCIFIED CORONARY LESION: ICD-10-CM

## 2020-06-16 DIAGNOSIS — E78.2 MIXED HYPERLIPIDEMIA: ICD-10-CM

## 2020-06-16 DIAGNOSIS — I25.10 CORONARY ARTERY DISEASE DUE TO CALCIFIED CORONARY LESION: ICD-10-CM

## 2020-06-16 DIAGNOSIS — R06.02 SHORTNESS OF BREATH: ICD-10-CM

## 2020-06-16 DIAGNOSIS — R07.89 CHEST TIGHTNESS: ICD-10-CM

## 2020-06-16 LAB
ALBUMIN SERPL-MCNC: 4.22 G/DL (ref 3.5–5.2)
ALBUMIN/GLOB SERPL: 1.5 G/DL
ALP SERPL-CCNC: 71 U/L (ref 39–117)
ALT SERPL W P-5'-P-CCNC: 74 U/L (ref 1–33)
ANION GAP SERPL CALCULATED.3IONS-SCNC: 12.3 MMOL/L (ref 5–15)
AST SERPL-CCNC: 79 U/L (ref 1–32)
BASOPHILS # BLD AUTO: 0.04 10*3/MM3 (ref 0–0.2)
BASOPHILS NFR BLD AUTO: 1 % (ref 0–1.5)
BILIRUB SERPL-MCNC: 0.2 MG/DL (ref 0.2–1.2)
BUN BLD-MCNC: 13 MG/DL (ref 8–23)
BUN/CREAT SERPL: 22 (ref 7–25)
CALCIUM SPEC-SCNC: 9.7 MG/DL (ref 8.6–10.5)
CHLORIDE SERPL-SCNC: 103 MMOL/L (ref 98–107)
CHOLEST SERPL-MCNC: 121 MG/DL (ref 0–200)
CO2 SERPL-SCNC: 24.7 MMOL/L (ref 22–29)
CREAT BLD-MCNC: 0.59 MG/DL (ref 0.57–1)
DEPRECATED RDW RBC AUTO: 55.5 FL (ref 37–54)
EOSINOPHIL # BLD AUTO: 0.23 10*3/MM3 (ref 0–0.4)
EOSINOPHIL NFR BLD AUTO: 5.9 % (ref 0.3–6.2)
ERYTHROCYTE [DISTWIDTH] IN BLOOD BY AUTOMATED COUNT: 15.9 % (ref 12.3–15.4)
GFR SERPL CREATININE-BSD FRML MDRD: 99 ML/MIN/1.73
GLOBULIN UR ELPH-MCNC: 2.9 GM/DL
GLUCOSE BLD-MCNC: 141 MG/DL (ref 65–99)
HBA1C MFR BLD: 6.4 % (ref 4.8–5.6)
HCT VFR BLD AUTO: 39.2 % (ref 34–46.6)
HDLC SERPL-MCNC: 37 MG/DL (ref 40–60)
HGB BLD-MCNC: 12 G/DL (ref 12–15.9)
IMM GRANULOCYTES # BLD AUTO: 0.01 10*3/MM3 (ref 0–0.05)
IMM GRANULOCYTES NFR BLD AUTO: 0.3 % (ref 0–0.5)
INR PPP: 2.29 (ref 0.9–1.1)
LDLC SERPL CALC-MCNC: 31 MG/DL (ref 0–100)
LDLC/HDLC SERPL: 0.84 {RATIO}
LYMPHOCYTES # BLD AUTO: 1.14 10*3/MM3 (ref 0.7–3.1)
LYMPHOCYTES NFR BLD AUTO: 29 % (ref 19.6–45.3)
MCH RBC QN AUTO: 29.1 PG (ref 26.6–33)
MCHC RBC AUTO-ENTMCNC: 30.6 G/DL (ref 31.5–35.7)
MCV RBC AUTO: 94.9 FL (ref 79–97)
MONOCYTES # BLD AUTO: 0.57 10*3/MM3 (ref 0.1–0.9)
MONOCYTES NFR BLD AUTO: 14.5 % (ref 5–12)
NEUTROPHILS # BLD AUTO: 1.94 10*3/MM3 (ref 1.7–7)
NEUTROPHILS NFR BLD AUTO: 49.3 % (ref 42.7–76)
NRBC BLD AUTO-RTO: 0 /100 WBC (ref 0–0.2)
PLATELET # BLD AUTO: 189 10*3/MM3 (ref 140–450)
PMV BLD AUTO: 10.9 FL (ref 6–12)
POTASSIUM BLD-SCNC: 4.4 MMOL/L (ref 3.5–5.2)
PROT SERPL-MCNC: 7.1 G/DL (ref 6–8.5)
PROTHROMBIN TIME: 25.1 SECONDS (ref 11.9–14.1)
RBC # BLD AUTO: 4.13 10*6/MM3 (ref 3.77–5.28)
SODIUM BLD-SCNC: 140 MMOL/L (ref 136–145)
TRIGL SERPL-MCNC: 265 MG/DL (ref 0–150)
VLDLC SERPL-MCNC: 53 MG/DL
WBC NRBC COR # BLD: 3.93 10*3/MM3 (ref 3.4–10.8)

## 2020-06-16 PROCEDURE — 85610 PROTHROMBIN TIME: CPT | Performed by: NURSE PRACTITIONER

## 2020-06-16 PROCEDURE — 85025 COMPLETE CBC W/AUTO DIFF WBC: CPT | Performed by: NURSE PRACTITIONER

## 2020-06-16 PROCEDURE — 36415 COLL VENOUS BLD VENIPUNCTURE: CPT

## 2020-06-16 PROCEDURE — 80061 LIPID PANEL: CPT | Performed by: NURSE PRACTITIONER

## 2020-06-16 PROCEDURE — 80053 COMPREHEN METABOLIC PANEL: CPT | Performed by: NURSE PRACTITIONER

## 2020-06-16 PROCEDURE — 83036 HEMOGLOBIN GLYCOSYLATED A1C: CPT | Performed by: NURSE PRACTITIONER

## 2020-06-16 NOTE — TELEPHONE ENCOUNTER
Patient states she had been taking Nitro every morning. Instructed patient she is only to take Nitro if having cp, wait 5 minutes and can take up to three times, then go to ER. Patient verbalized understanding. Patient is not taking IMDUR. Inge Velez LPN          ----- Message from Inge Velez LPN sent at 6/16/2020  8:04 AM EDT -----  Instruct on imdur and nitro

## 2020-06-17 ENCOUNTER — ANTICOAGULATION VISIT (OUTPATIENT)
Dept: CARDIOLOGY | Facility: CLINIC | Age: 80
End: 2020-06-17

## 2020-06-17 ENCOUNTER — TELEPHONE (OUTPATIENT)
Dept: CARDIOLOGY | Facility: CLINIC | Age: 80
End: 2020-06-17

## 2020-06-17 LAB — INR PPP: 2.29 (ref 2–3)

## 2020-06-17 RX ORDER — ISOSORBIDE MONONITRATE 30 MG/1
30 TABLET, EXTENDED RELEASE ORAL DAILY
Qty: 30 TABLET | Refills: 11 | Status: SHIPPED | OUTPATIENT
Start: 2020-06-17 | End: 2020-08-18

## 2020-06-17 NOTE — TELEPHONE ENCOUNTER
NO DOSAGE CHANGE AND RECHECK LEVEL IN 1 MO. PER SILVANA WONG, PAC. VERBAL ORDERS READ BACK AND VERIFIED BY SILVANA WONG, PAC. PATIENT AWARE VERBALIZED OK. ANUSHKA GARCIA            ----- Message from TRU Robins sent at 6/17/2020  6:49 AM EDT -----  INR

## 2020-06-17 NOTE — PROGRESS NOTES
NO DOSAGE CHANGE AND RECHECK LEVEL IN 1 MO. PER SILVANA WONG, PAC. VERBAL ORDERS READ BACK AND VERIFIED BY SILVANA WONG, PAC. PATIENT AWARE VERBALIZED OK. PH,LPN

## 2020-06-22 ENCOUNTER — TELEPHONE (OUTPATIENT)
Dept: CARDIOLOGY | Facility: CLINIC | Age: 80
End: 2020-06-22

## 2020-06-22 NOTE — TELEPHONE ENCOUNTER
Patient informed of labs and instructions. Patient verbalized understanding. Copy sent to pcp Dr. Arvizu. Inge Velez LPN      ----- Message from TRU Rendon sent at 6/20/2020  8:16 AM EDT -----  Regarding: FW:  Hemoglobin A1C 6.4. Triglycerides elevated to 244. Watch carbohydrates, pastas breads and sweets  ----- Message -----  From: Lab, Background User  Sent: 6/16/2020   6:33 PM EDT  To: TRU Rendon

## 2020-06-23 ENCOUNTER — TELEPHONE (OUTPATIENT)
Dept: CARDIOLOGY | Facility: CLINIC | Age: 80
End: 2020-06-23

## 2020-06-23 NOTE — TELEPHONE ENCOUNTER
Spoke to RAYA Harris and let him know patient is taking Imdur 30mg qd. He said patient may trying taking 1/2 tablet qd and see if this helps chest pressure without the headaches and if she continues to have problems it is okay to stop it altogether. Patient notified. States she hasn't taken the Imdur today and doesn't have a headache. She wants to wait a couple of days and then may try taking 1/2 a tablet qd to see if that helps chest pressure. She is also concerned that symptoms are more GI related so she was advised to talk to her PCP about this. SHAHAB EDMOND    Patient left a msg that she has been having daily headaches since starting on Imdur. Would like a call back.  SHAHAB EDMOND

## 2020-06-30 ENCOUNTER — TELEPHONE (OUTPATIENT)
Dept: CARDIOLOGY | Facility: CLINIC | Age: 80
End: 2020-06-30

## 2020-06-30 DIAGNOSIS — I10 ESSENTIAL HYPERTENSION: ICD-10-CM

## 2020-06-30 RX ORDER — AMLODIPINE BESYLATE 5 MG/1
5 TABLET ORAL DAILY
Qty: 90 TABLET | Refills: 3 | Status: SHIPPED | OUTPATIENT
Start: 2020-06-30 | End: 2021-06-17

## 2020-06-30 RX ORDER — CLOPIDOGREL BISULFATE 75 MG/1
75 TABLET ORAL DAILY
Qty: 90 TABLET | Refills: 3 | Status: SHIPPED | OUTPATIENT
Start: 2020-06-30 | End: 2021-06-24

## 2020-06-30 NOTE — TELEPHONE ENCOUNTER
[facility]. Estefania Pierson MA

## 2020-06-30 NOTE — TELEPHONE ENCOUNTER
PT REPORTS SHE'S BEEN WITHOUT HER NORVASC SINCE LAST WEEK DUE TO HER PCP WON'T CALL IT IN DUE TO NO RECENT F/U.  KOMAL AWARE AND NOTIFIED.  SHE WILL DISCUSS WITH SHELLI AND ADDRESS.

## 2020-07-14 DIAGNOSIS — I10 ESSENTIAL HYPERTENSION: ICD-10-CM

## 2020-07-14 RX ORDER — LISINOPRIL 20 MG/1
TABLET ORAL
Qty: 180 TABLET | Refills: 0 | Status: SHIPPED | OUTPATIENT
Start: 2020-07-14 | End: 2020-10-12

## 2020-07-20 ENCOUNTER — LAB (OUTPATIENT)
Dept: LAB | Facility: HOSPITAL | Age: 80
End: 2020-07-20

## 2020-07-20 DIAGNOSIS — I48.0 PAROXYSMAL ATRIAL FIBRILLATION (HCC): ICD-10-CM

## 2020-07-20 LAB
INR PPP: 2.3 (ref 0.9–1.1)
PROTHROMBIN TIME: 25.2 SECONDS (ref 11.9–14.1)

## 2020-07-20 PROCEDURE — 36415 COLL VENOUS BLD VENIPUNCTURE: CPT

## 2020-07-20 PROCEDURE — 85610 PROTHROMBIN TIME: CPT | Performed by: NURSE PRACTITIONER

## 2020-07-21 ENCOUNTER — TELEPHONE (OUTPATIENT)
Dept: CARDIOLOGY | Facility: CLINIC | Age: 80
End: 2020-07-21

## 2020-07-21 ENCOUNTER — ANTICOAGULATION VISIT (OUTPATIENT)
Dept: CARDIOLOGY | Facility: CLINIC | Age: 80
End: 2020-07-21

## 2020-07-21 LAB — INR PPP: 2.3 (ref 2–3)

## 2020-07-21 NOTE — TELEPHONE ENCOUNTER
INR 2.3 DRAWN YEST. NO DOSAGE CHANGE AND RECHECK LEVEL IN 1 MO.  PER SHELLI HENDRICKSON, PAC. VERBAL ORDERS READ BACK AND VERIFIED BY SHELLI HENDRICKSON, PAC. PATIENT AWARE VERBALIZED OK. PH,LPN

## 2020-08-18 ENCOUNTER — OFFICE VISIT (OUTPATIENT)
Dept: CARDIOLOGY | Facility: CLINIC | Age: 80
End: 2020-08-18

## 2020-08-18 VITALS
HEART RATE: 70 BPM | WEIGHT: 158.6 LBS | SYSTOLIC BLOOD PRESSURE: 153 MMHG | HEIGHT: 66 IN | DIASTOLIC BLOOD PRESSURE: 77 MMHG | OXYGEN SATURATION: 98 % | TEMPERATURE: 98 F | BODY MASS INDEX: 25.49 KG/M2

## 2020-08-18 DIAGNOSIS — R42 DIZZINESS: ICD-10-CM

## 2020-08-18 DIAGNOSIS — I25.10 CORONARY ARTERY DISEASE DUE TO CALCIFIED CORONARY LESION: ICD-10-CM

## 2020-08-18 DIAGNOSIS — R06.02 SHORTNESS OF BREATH: ICD-10-CM

## 2020-08-18 DIAGNOSIS — R00.2 PALPITATIONS: Primary | ICD-10-CM

## 2020-08-18 DIAGNOSIS — I10 ESSENTIAL HYPERTENSION: ICD-10-CM

## 2020-08-18 DIAGNOSIS — I25.84 CORONARY ARTERY DISEASE DUE TO CALCIFIED CORONARY LESION: ICD-10-CM

## 2020-08-18 PROCEDURE — 93000 ELECTROCARDIOGRAM COMPLETE: CPT | Performed by: PHYSICIAN ASSISTANT

## 2020-08-18 PROCEDURE — 99213 OFFICE O/P EST LOW 20 MIN: CPT | Performed by: PHYSICIAN ASSISTANT

## 2020-08-18 NOTE — PROGRESS NOTES
Problem list     Subjective   Nanci Pritchard is a 80 y.o. female     Chief Complaint   Patient presents with   • Atrial Fibrillation     presents for 3 month f/u   • Chest Pain   • Hypertension   • Shortness of Breath   Problem List:  1. Sick sinus syndrome.  1.1. Greater than 3 second pause noted per event monitor.  1.2. The patient was subsequently admitted to [facility] with placement of dual-chamber pacemaker per Dr. Gutierrez.  1.3. Apparent lead dislodgment with subsequent fixation of the leads the following day post pacemaker placement.  2. Preserved systolic function  3. Chest Discomfort  3.1 Low risk stress test, 09/2016.  3.2 Stress 7/2019 possible anterolateral wall ischemia  3.3 LHC 8/2019 stenting of LAD and RCA  3.4 Repeat [facility], 5/2020, with stenting of the LAD for subtotal IntraStent restenosis.  The patient had moderate but nonobstructive disease otherwise with medical management recommended.  4. Hypertension  5. Atrial Fibrillation  6. Fibromyalgia    HPI  This pleasant patient presents back in for review.  She is concerned about some symptoms.  She has had recurrent episodes of dizziness.  She apparently recently had a evaluation with her dentist who was concerned about physical exam findings to support potential stroke.  The patient has no deficits at this time that I can see no by exam however.  The patient has no chest pain, reporting marked improvement post recent stenting of the LAD as above.  She has stable dyspnea.  She has no syncope.  She has no failure symptoms.  The patient reports largely normotensive status despite today's values.  She has started having increasing palpitations which are a very big concern for her as well.    Current Outpatient Medications on File Prior to Visit   Medication Sig Dispense Refill   • amLODIPine (NORVASC) 5 MG tablet Take 1 tablet by mouth Daily. 90 tablet 3   • Calcium Carbonate (CALTRATE 600) 1500 (600 Ca) MG tablet Take 600 mg by  mouth Daily.     • cholecalciferol (VITAMIN D3) 25 MCG (1000 UT) tablet Take 1,000 Units by mouth Daily.     • clopidogrel (PLAVIX) 75 MG tablet Take 1 tablet by mouth Daily. 90 tablet 3   • DULoxetine (CYMBALTA) 60 MG capsule Take 60 mg by mouth Daily.     • Evolocumab with Infusor (REPATHA PUSHTRONEX SYSTEM) 420 MG/3.5ML solution cartridge Inject 1 Device under the skin into the appropriate area as directed Every 30 (Thirty) Days. 1 cartridge. 11   • furosemide (LASIX) 20 MG tablet Take 1 tablet by mouth Daily As Needed (For edema). Take 1 tablet daily for 3 pound weight gain 60 tablet 3   • glimepiride (AMARYL) 4 MG tablet Take 4 mg by mouth Daily.     • lisinopril (PRINIVIL,ZESTRIL) 20 MG tablet Take 1 tablet by mouth twice daily 180 tablet 0   • metFORMIN (GLUCOPHAGE) 500 MG tablet Take 1,000 mg by mouth 2 (Two) Times a Day With Meals.     • metoprolol tartrate (LOPRESSOR) 50 MG tablet Take 1 tablet by mouth 2 (Two) Times a Day. 60 tablet 5   • Multiple Vitamins-Minerals (EYE VITAMINS) capsule Take  by mouth.     • nitroglycerin (NITROSTAT) 0.4 MG SL tablet 1 under the tongue as needed for angina, may repeat q5mins for up three doses 30 tablet 5   • omeprazole (priLOSEC) 40 MG capsule Take 40 mg by mouth Daily.     • Potassium 99 MG tablet Take  by mouth.     • vitamin B-12 (CYANOCOBALAMIN) 1000 MCG tablet Take 1,000 mcg by mouth Daily.     • vitamin D (ERGOCALCIFEROL) 1.25 MG (40509 UT) capsule capsule Take 1 capsule by mouth Daily.     • warfarin (COUMADIN) 5 MG tablet TAKE 1 TABLET EVERY DAY (Patient taking differently: Take 5 mg by mouth Every Night.) 90 tablet 3   • [DISCONTINUED] isosorbide mononitrate (IMDUR) 30 MG 24 hr tablet Take 1 tablet by mouth Daily. 30 tablet 11     No current facility-administered medications on file prior to visit.        Eliquis [apixaban] and Soma [carisoprodol]    Past Medical History:   Diagnosis Date   • Arrhythmia    • Atrial fibrillation (CMS/HCC)    • Encounter for  monitoring Coumadin therapy    • Fibromyalgia    • Fracture of face bones due to fall, closed, initial encounter (CMS/[facility])    • Histoplasmosis    • Hyperlipidemia    • Hypertension    • Osteoarthritis    • SSS (sick sinus syndrome) (CMS/[facility])        Social History     Socioeconomic History   • Marital status:      Spouse name: Not on file   • Number of children: Not on file   • Years of education: Not on file   • Highest education level: Not on file   Tobacco Use   • Smoking status: Never Smoker   • Smokeless tobacco: Never Used   Substance and Sexual Activity   • Alcohol use: No   • Drug use: No   • Sexual activity: Defer       Family History   Problem Relation Age of Onset   • Heart disease Mother    • Heart disease Father        Review of Systems   Constitutional: Positive for fatigue.   HENT: Negative.    Eyes: Positive for visual disturbance.   Respiratory: Positive for chest tightness and shortness of breath (with daily activity). Negative for apnea, cough and wheezing.    Cardiovascular: Positive for chest pain and palpitations. Negative for leg swelling.   Gastrointestinal: Negative.  Negative for abdominal pain, blood in stool, constipation, diarrhea, nausea and vomiting.   Endocrine: Negative.    Genitourinary: Negative.  Negative for hematuria.   Musculoskeletal: Positive for arthralgias, back pain, myalgias and neck pain.   Skin: Negative.  Negative for rash and wound.   Allergic/Immunologic: Positive for environmental allergies. Negative for food allergies.   Neurological: Positive for dizziness (occas) and weakness. Negative for syncope, light-headedness, numbness and headaches.   Hematological: Bruises/bleeds easily.   Psychiatric/Behavioral: Negative.  Negative for agitation and sleep disturbance. The patient is not nervous/anxious.        Objective   Vitals:    08/18/20 1622   BP: 153/77   BP Location: Left arm   Patient Position: Sitting   Pulse: 70   Temp: 98 °F (36.7 °C)   SpO2: 98%    "  Weight: 71.9 kg (158 lb 9.6 oz)   Height: 167.6 cm (65.98\")      /77 (BP Location: Left arm, Patient Position: Sitting)   Pulse 70   Temp 98 °F (36.7 °C)   Ht 167.6 cm (65.98\")   Wt 71.9 kg (158 lb 9.6 oz)   SpO2 98%   BMI 25.61 kg/m²    Lab Results (most recent)     None        Physical Exam   Constitutional: She is oriented to person, place, and time. She appears well-developed and well-nourished.   HENT:   Head: Normocephalic and atraumatic.   Eyes: Pupils are equal, round, and reactive to light. EOM are normal.   Neck: Trachea normal and phonation normal. Neck supple. No JVD present. Carotid bruit is not present. No thyroid mass present.   Cardiovascular: Normal rate, regular rhythm, normal heart sounds and intact distal pulses. Exam reveals no gallop and no friction rub.   No murmur heard.  Pulses:       Radial pulses are 2+ on the right side, and 2+ on the left side.        Posterior tibial pulses are 2+ on the right side, and 2+ on the left side.   Pulmonary/Chest: Effort normal and breath sounds normal. No respiratory distress. She has no wheezes. She has no rales.   Abdominal: Soft. Bowel sounds are normal. She exhibits no distension and no abdominal bruit. There is no tenderness.   Musculoskeletal: Normal range of motion. She exhibits no edema.   Neurological: She is alert and oriented to person, place, and time. She has normal strength. No cranial nerve deficit or sensory deficit.   Skin: Skin is warm, dry and intact. Capillary refill takes less than 2 seconds. No rash noted.   Psychiatric: She has a normal mood and affect. Her speech is normal and behavior is normal. Judgment and thought content normal. Cognition and memory are normal.   Nursing note and vitals reviewed.        Procedure     ECG 12 Lead  Date/Time: 8/18/2020 4:26 PM  Performed by: Edilson Souza PA  Authorized by: Edilson Souza PA   Comparison: compared with previous ECG from 1/17/2020  Comparison to previous ECG: " Sinus rhythm at 67, normal axis, no acute changes noted.                 Assessment/Plan      Diagnosis Plan   1. Palpitations  ECG 12 Lead   2. Essential hypertension  ECG 12 Lead   3. Shortness of breath  ECG 12 Lead   4. Dizziness  Duplex Carotid Ultrasound CAR   5. Coronary artery disease due to calcified coronary lesion         1.  The patient's biggest concern at this time is with palpitations.  We will schedule for a device interrogation so that we can screen for potential dysrhythmic substrates.  We can recommend her further on her complaints of palpitations/tachycardia at that time.    2.  She is also concerned about her degree of dizziness and complications otherwise.  I would schedule for carotid duplex at this time.    3.  She feels much improved after recent stenting of the LAD for IntraStent restenosis as above.  She has nonobstructive disease otherwise which we will be managing medically.    4.  For now, I would continue medical regimen.  We will see her back after the above studies are available.  We can recommend her further at that time.         Nanci Pritchard  reports that she has never smoked. She has never used smokeless tobacco.        Patient's Body mass index is 25.61 kg/m². BMI is within normal parameters. No follow-up required..             Electronically signed by:

## 2020-08-21 ENCOUNTER — OFFICE VISIT (OUTPATIENT)
Dept: CARDIOLOGY | Facility: CLINIC | Age: 80
End: 2020-08-21

## 2020-08-21 DIAGNOSIS — I49.5 SSS (SICK SINUS SYNDROME) (HCC): ICD-10-CM

## 2020-08-21 PROCEDURE — 93288 INTERROG EVL PM/LDLS PM IP: CPT | Performed by: INTERNAL MEDICINE

## 2020-08-24 ENCOUNTER — LAB (OUTPATIENT)
Dept: LAB | Facility: HOSPITAL | Age: 80
End: 2020-08-24

## 2020-08-24 DIAGNOSIS — I48.0 PAROXYSMAL ATRIAL FIBRILLATION (HCC): ICD-10-CM

## 2020-08-24 LAB
INR PPP: 2.09 (ref 0.9–1.1)
PROTHROMBIN TIME: 23.3 SECONDS (ref 11.9–14.1)

## 2020-08-24 PROCEDURE — 85610 PROTHROMBIN TIME: CPT | Performed by: NURSE PRACTITIONER

## 2020-08-24 PROCEDURE — 36415 COLL VENOUS BLD VENIPUNCTURE: CPT

## 2020-08-25 ENCOUNTER — HOSPITAL ENCOUNTER (OUTPATIENT)
Dept: CARDIOLOGY | Facility: HOSPITAL | Age: 80
Discharge: HOME | End: 2020-08-25
Admitting: PHYSICIAN ASSISTANT

## 2020-08-25 ENCOUNTER — ANTICOAGULATION VISIT (OUTPATIENT)
Dept: CARDIOLOGY | Facility: CLINIC | Age: 80
End: 2020-08-25

## 2020-08-25 ENCOUNTER — TELEPHONE (OUTPATIENT)
Dept: CARDIOLOGY | Facility: CLINIC | Age: 80
End: 2020-08-25

## 2020-08-25 DIAGNOSIS — R42 DIZZINESS: ICD-10-CM

## 2020-08-25 LAB — INR PPP: 2.09 (ref 2–3)

## 2020-08-25 PROCEDURE — 93880 EXTRACRANIAL BILAT STUDY: CPT | Performed by: INTERNAL MEDICINE

## 2020-08-25 PROCEDURE — 93880 EXTRACRANIAL BILAT STUDY: CPT

## 2020-08-25 NOTE — TELEPHONE ENCOUNTER
INR 2.09 DRAWN YEST. NO DOSAGE CHANGE AND RECHECK LEVEL IN 1 MO. PER SHELLI HENDRICKSON, PAC. VERBAL ORDERS READ BACK AND VERIFIED BY SHELLI HENDRICKSON, PAC. PATIENT AWARE VERBALIZED OK. PH,LPN

## 2020-08-30 LAB
BH CV ECHO MEAS - BSA(HAYCOCK): 1.8 M^2
BH CV ECHO MEAS - BSA: 1.8 M^2
BH CV ECHO MEAS - BZI_BMI: 25.5 KILOGRAMS/M^2
BH CV ECHO MEAS - BZI_METRIC_HEIGHT: 167.6 CM
BH CV ECHO MEAS - BZI_METRIC_WEIGHT: 71.7 KG
BH CV XLRA MEAS LEFT BULB EDV: -32.1 CM/SEC
BH CV XLRA MEAS LEFT BULB PSV: -112.4 CM/SEC
BH CV XLRA MEAS LEFT CCA RATIO VEL: -69.2 CM/SEC
BH CV XLRA MEAS LEFT DIST CCA EDV: -20.1 CM/SEC
BH CV XLRA MEAS LEFT DIST CCA PSV: -69.7 CM/SEC
BH CV XLRA MEAS LEFT DIST ICA EDV: -22.3 CM/SEC
BH CV XLRA MEAS LEFT DIST ICA PSV: -110.3 CM/SEC
BH CV XLRA MEAS LEFT ICA RATIO VEL: -153 CM/SEC
BH CV XLRA MEAS LEFT ICA/CCA RATIO: 2.2
BH CV XLRA MEAS LEFT MID ICA EDV: -36.3 CM/SEC
BH CV XLRA MEAS LEFT MID ICA PSV: -89.4 CM/SEC
BH CV XLRA MEAS LEFT PROX CCA EDV: 13.3 CM/SEC
BH CV XLRA MEAS LEFT PROX CCA PSV: 78.6 CM/SEC
BH CV XLRA MEAS LEFT PROX ECA EDV: -31.4 CM/SEC
BH CV XLRA MEAS LEFT PROX ECA PSV: -172 CM/SEC
BH CV XLRA MEAS LEFT PROX ICA EDV: -32.1 CM/SEC
BH CV XLRA MEAS LEFT PROX ICA PSV: -153.6 CM/SEC
BH CV XLRA MEAS LEFT VERTEBRAL A EDV: -18.2 CM/SEC
BH CV XLRA MEAS LEFT VERTEBRAL A PSV: -68.4 CM/SEC
BH CV XLRA MEAS RIGHT BULB EDV: -15.3 CM/SEC
BH CV XLRA MEAS RIGHT BULB PSV: -51.9 CM/SEC
BH CV XLRA MEAS RIGHT CCA RATIO VEL: -51.3 CM/SEC
BH CV XLRA MEAS RIGHT DIST CCA EDV: -12.2 CM/SEC
BH CV XLRA MEAS RIGHT DIST CCA PSV: -51.7 CM/SEC
BH CV XLRA MEAS RIGHT DIST ICA EDV: -44.8 CM/SEC
BH CV XLRA MEAS RIGHT DIST ICA PSV: -150.9 CM/SEC
BH CV XLRA MEAS RIGHT ICA RATIO VEL: -150 CM/SEC
BH CV XLRA MEAS RIGHT ICA/CCA RATIO: 2.9
BH CV XLRA MEAS RIGHT MID ICA EDV: -46.4 CM/SEC
BH CV XLRA MEAS RIGHT MID ICA PSV: -138.3 CM/SEC
BH CV XLRA MEAS RIGHT PROX CCA EDV: 14.5 CM/SEC
BH CV XLRA MEAS RIGHT PROX CCA PSV: 79.2 CM/SEC
BH CV XLRA MEAS RIGHT PROX ECA EDV: -17 CM/SEC
BH CV XLRA MEAS RIGHT PROX ECA PSV: -142.1 CM/SEC
BH CV XLRA MEAS RIGHT PROX ICA EDV: -25.1 CM/SEC
BH CV XLRA MEAS RIGHT PROX ICA PSV: -101.8 CM/SEC
BH CV XLRA MEAS RIGHT VERTEBRAL A EDV: -11.8 CM/SEC
BH CV XLRA MEAS RIGHT VERTEBRAL A PSV: -36.2 CM/SEC

## 2020-08-31 ENCOUNTER — TELEPHONE (OUTPATIENT)
Dept: CARDIOLOGY | Facility: CLINIC | Age: 80
End: 2020-08-31

## 2020-08-31 NOTE — TELEPHONE ENCOUNTER
Called patient with carotid results. She states since the ultrasound she has had a severe headache and sore spots in her head. She states her fibromyalgia flares up any time she has an ultrasound and usually passes after a few days. Estefania Pierson MA      ----- Message from RAYA Louie sent at 8/31/2020  8:40 AM EDT -----  Nonobstructive carotid disease.  Routine follow-up.    Result Text     1.  Both common carotid arteries are patent.     2.  There is mild to moderate bifurcation disease bilaterally with 16 to 49% stenosis by Doppler sampling and with diffuse fibrotic plaque bilaterally on echo imaging.     3.  16 to 49% stenosis by Doppler throughout the length of the right internal carotid artery and in the proximal and distal left internal carotid artery.     4.  Antegrade flow in both vertebral arteries.     Summary: Moderate but nonobstructive carotid disease bilaterally as above.  Antegrade flow in both vertebral arteries.  ICA to CCA ratio of 2.9 on the right suggests that stenosis may be in the range of 50% or somewhat greater in the distal right internal carotid artery.

## 2020-09-03 ENCOUNTER — TELEPHONE (OUTPATIENT)
Dept: CARDIOLOGY | Facility: CLINIC | Age: 80
End: 2020-09-03

## 2020-09-03 RX ORDER — RANOLAZINE 500 MG/1
500 TABLET, EXTENDED RELEASE ORAL 2 TIMES DAILY
Qty: 60 TABLET | Refills: 11 | Status: SHIPPED | OUTPATIENT
Start: 2020-09-03 | End: 2022-02-08

## 2020-09-03 NOTE — TELEPHONE ENCOUNTER
Per Edilson: D/C Imdur, start Ranexa 500mg PO BID.     Called and informed pt of the above, she verbalized understanding.

## 2020-09-03 NOTE — TELEPHONE ENCOUNTER
Pt LVM stating she needs to speak w/ Leyda. No additional info was left.   #[phone]      Called pt, she stated that she gets HA immediately after ultrasounds. States that the day after her US, she started Isosorbide. States she had the worst HA of her life for several days, states she stopped taking it 4 days ago and she feels much better. She requested alt rx.

## 2020-09-21 ENCOUNTER — TELEPHONE (OUTPATIENT)
Dept: CARDIOLOGY | Facility: CLINIC | Age: 80
End: 2020-09-21

## 2020-09-21 NOTE — TELEPHONE ENCOUNTER
"----- Message from Charleen Jorgensen LPN sent at 9/21/2020  8:50 AM EDT -----  Called  regarding INR and she states she has had \"wheezing\" x 2 weeks, chest soreness/sore to touch, looses breath, squeezing/crushing feeling.  States stopped taking Ranexa, states it felt like her chest was going to \"bust open\". Felt chest tightening up while talking to me on the phone. Told her you would discuss with Edilson and call her back. Thanks, Pat    "

## 2020-09-21 NOTE — TELEPHONE ENCOUNTER
Per Edilson: pt needs to see PCP, hopefully PCP will order a chest x-ray and labs.     Spoke w/ pt and informed her of the above, she stated both she and her  have appt next week. I advised her to call and see if they have anything sooner based on her sx, she verbalized understanding.

## 2020-09-24 ENCOUNTER — LAB (OUTPATIENT)
Dept: LAB | Facility: HOSPITAL | Age: 80
End: 2020-09-24

## 2020-09-24 DIAGNOSIS — I48.0 PAROXYSMAL ATRIAL FIBRILLATION (HCC): ICD-10-CM

## 2020-09-24 LAB
INR PPP: 2.39 (ref 0.9–1.1)
PROTHROMBIN TIME: 26 SECONDS (ref 11.9–14.1)

## 2020-09-24 PROCEDURE — 85610 PROTHROMBIN TIME: CPT | Performed by: NURSE PRACTITIONER

## 2020-09-24 PROCEDURE — 36415 COLL VENOUS BLD VENIPUNCTURE: CPT

## 2020-09-25 ENCOUNTER — TELEPHONE (OUTPATIENT)
Dept: CARDIOLOGY | Facility: CLINIC | Age: 80
End: 2020-09-25

## 2020-09-25 ENCOUNTER — ANTICOAGULATION VISIT (OUTPATIENT)
Dept: CARDIOLOGY | Facility: CLINIC | Age: 80
End: 2020-09-25

## 2020-09-25 LAB — INR PPP: 2.39 (ref 2–3)

## 2020-10-12 DIAGNOSIS — I10 ESSENTIAL HYPERTENSION: ICD-10-CM

## 2020-10-12 RX ORDER — LISINOPRIL 20 MG/1
TABLET ORAL
Qty: 180 TABLET | Refills: 3 | Status: SHIPPED | OUTPATIENT
Start: 2020-10-12 | End: 2021-10-29

## 2020-10-13 ENCOUNTER — TELEPHONE (OUTPATIENT)
Dept: CARDIOLOGY | Facility: CLINIC | Age: 80
End: 2020-10-13

## 2020-10-13 NOTE — TELEPHONE ENCOUNTER
Patient states Dr Arvizu prescribed Fenofibrate and was advised it may interact with Coumadin. Per Edilson Souza PA-C, patient is okay to take Fenofibrate however adjustments may have to be made to Coumadin dosage. She will have PT-INR drawn 5-7 days after starting medication. Estefania Pierson MA        ----- Message from Leyda Santo MA sent at 10/13/2020 10:41 AM EDT -----  Regarding: Med concern  Pt left a msg that she wants to talk to Estefania, said her PCP had started her on a med that may interact with Coumadin.

## 2020-10-13 NOTE — TELEPHONE ENCOUNTER
Pt left an additional message stating she was started on Fenofibrate. Stated she wants to know if Edilson thinks she should take it.   #[phone]

## 2020-10-28 ENCOUNTER — LAB (OUTPATIENT)
Dept: LAB | Facility: HOSPITAL | Age: 80
End: 2020-10-28

## 2020-10-28 DIAGNOSIS — I48.0 PAROXYSMAL ATRIAL FIBRILLATION (HCC): ICD-10-CM

## 2020-10-28 LAB
INR PPP: 2.01 (ref 0.9–1.1)
PROTHROMBIN TIME: 22.6 SECONDS (ref 11.9–14.1)

## 2020-10-28 PROCEDURE — 85610 PROTHROMBIN TIME: CPT | Performed by: NURSE PRACTITIONER

## 2020-10-28 PROCEDURE — 36415 COLL VENOUS BLD VENIPUNCTURE: CPT

## 2020-10-29 ENCOUNTER — ANTICOAGULATION VISIT (OUTPATIENT)
Dept: CARDIOLOGY | Facility: CLINIC | Age: 80
End: 2020-10-29

## 2020-10-29 ENCOUNTER — TELEPHONE (OUTPATIENT)
Dept: CARDIOLOGY | Facility: CLINIC | Age: 80
End: 2020-10-29

## 2020-10-29 LAB — INR PPP: 2.01 (ref 2–3)

## 2020-10-29 NOTE — TELEPHONE ENCOUNTER
INR 2.01 DRAWN YEST. NO DOSAGE CHANGE AND RECHECK LEVEL IN 1 MO. PER SHELLI HENDRICKSON, PAC. VERBAL ORDERS READ BACK AND VERIFIED BY SHELLI HENDRICKSON, PAC. PATIENT AWARE VERBALIZED OK. PH,LPN

## 2020-11-12 DIAGNOSIS — E78.2 MIXED HYPERLIPIDEMIA: ICD-10-CM

## 2020-11-12 DIAGNOSIS — I25.84 CORONARY ARTERY DISEASE DUE TO CALCIFIED CORONARY LESION: ICD-10-CM

## 2020-11-12 DIAGNOSIS — I25.10 CORONARY ARTERY DISEASE DUE TO CALCIFIED CORONARY LESION: ICD-10-CM

## 2020-11-13 RX ORDER — EVOLOCUMAB 420 MG/3.5
420 KIT SUBCUTANEOUS
Qty: 1 CARTRIDGE | Refills: 11 | Status: SHIPPED | OUTPATIENT
Start: 2020-11-13 | End: 2021-02-17 | Stop reason: SDUPTHER

## 2020-12-02 ENCOUNTER — LAB (OUTPATIENT)
Dept: LAB | Facility: HOSPITAL | Age: 80
End: 2020-12-02

## 2020-12-02 DIAGNOSIS — I48.0 PAROXYSMAL ATRIAL FIBRILLATION (HCC): ICD-10-CM

## 2020-12-02 LAB
INR PPP: 1.71 (ref 0.9–1.1)
INR PPP: 1.71 (ref 2–3)
PROTHROMBIN TIME: 19.9 SECONDS (ref 11.9–14.1)

## 2020-12-02 PROCEDURE — 36415 COLL VENOUS BLD VENIPUNCTURE: CPT

## 2020-12-02 PROCEDURE — 85610 PROTHROMBIN TIME: CPT

## 2020-12-03 ENCOUNTER — ANTICOAGULATION VISIT (OUTPATIENT)
Dept: CARDIOLOGY | Facility: CLINIC | Age: 80
End: 2020-12-03

## 2020-12-03 ENCOUNTER — TELEPHONE (OUTPATIENT)
Dept: CARDIOLOGY | Facility: CLINIC | Age: 80
End: 2020-12-03

## 2020-12-03 NOTE — TELEPHONE ENCOUNTER
INR 1.71. PATIENT CONFIRMED MISSING ONE DOSE.  PER SHELLI HENDRICKSON, NILESH CHANGE TAKEN AN EXTRA 2.5 MG TODAY THEN RESUME PREVIOUS DOSING AND RECHECK LEVEL IN 2 WEEKS. VERBAL ORDERS READ BACK AND VERIFIED BY NILESH PAINTING. PATIENT AWARE, VERBALIZED OK. PH,LPN

## 2020-12-07 DIAGNOSIS — I48.91 ATRIAL FIBRILLATION, UNSPECIFIED TYPE (HCC): ICD-10-CM

## 2020-12-07 RX ORDER — WARFARIN SODIUM 5 MG/1
5 TABLET ORAL DAILY
Qty: 90 TABLET | Refills: 0 | Status: SHIPPED | OUTPATIENT
Start: 2020-12-07 | End: 2021-03-08

## 2020-12-07 NOTE — TELEPHONE ENCOUNTER
Received faxed RF request from [facility] for Coumadin. Pended RF.     Pt needs appt scheduled, pended #[phone].

## 2020-12-14 DIAGNOSIS — I10 ESSENTIAL HYPERTENSION: ICD-10-CM

## 2020-12-14 DIAGNOSIS — I48.0 PAROXYSMAL ATRIAL FIBRILLATION (HCC): ICD-10-CM

## 2020-12-14 DIAGNOSIS — R00.2 PALPITATIONS: ICD-10-CM

## 2020-12-14 RX ORDER — METOPROLOL TARTRATE 50 MG/1
50 TABLET, FILM COATED ORAL 2 TIMES DAILY
Qty: 180 TABLET | Refills: 3 | Status: SHIPPED | OUTPATIENT
Start: 2020-12-14 | End: 2021-12-20 | Stop reason: SDUPTHER

## 2020-12-15 ENCOUNTER — ANTICOAGULATION VISIT (OUTPATIENT)
Dept: CARDIOLOGY | Facility: CLINIC | Age: 80
End: 2020-12-15

## 2020-12-15 ENCOUNTER — LAB (OUTPATIENT)
Dept: LAB | Facility: HOSPITAL | Age: 80
End: 2020-12-15

## 2020-12-15 ENCOUNTER — TELEPHONE (OUTPATIENT)
Dept: CARDIOLOGY | Facility: CLINIC | Age: 80
End: 2020-12-15

## 2020-12-15 DIAGNOSIS — I48.0 PAROXYSMAL ATRIAL FIBRILLATION (HCC): ICD-10-CM

## 2020-12-15 LAB
INR PPP: 1.99 (ref 0.9–1.1)
INR PPP: 1.99 (ref 2–3)
PROTHROMBIN TIME: 22.5 SECONDS (ref 11.9–14.1)

## 2020-12-15 PROCEDURE — 85610 PROTHROMBIN TIME: CPT

## 2020-12-15 PROCEDURE — 36415 COLL VENOUS BLD VENIPUNCTURE: CPT

## 2020-12-15 NOTE — PROGRESS NOTES
INR 1.99 DRAWN TODAY. NO DOSAGE CHANGE AND RECHECK LEVEL IN 1 MO.  PER SHELLI HENDRICKSON, PAC. VERBAL ORDERS READ BACK AND VERIFIED BY SHELLI HENDRICKSON, PAC. PATIENT AWARE VERBALIZED OK. PH,LPN

## 2020-12-15 NOTE — TELEPHONE ENCOUNTER
INR 1.99 DRAWN TODAY. NO DOSAGE CHANGE AND RECHECK LEVEL IN 1 MO.  PER SHELLI HENDRICKSON PAC. VERBAL ORDERS READ BACK AND VERIFIED BY NILESH PAINTING. PATIENT AWARE VERBALIZED OK. ANUSHKA GARCIA          ----- Message from Inge Velez LPN sent at 12/15/2020  2:41 PM EST -----    ----- Message -----  From: Lab, Background User  Sent: 12/15/2020   2:11 PM EST  To: Stanley Chacko [facility]

## 2021-01-20 ENCOUNTER — ANTICOAGULATION VISIT (OUTPATIENT)
Dept: CARDIOLOGY | Facility: CLINIC | Age: 81
End: 2021-01-20

## 2021-01-20 ENCOUNTER — TELEPHONE (OUTPATIENT)
Dept: CARDIOLOGY | Facility: CLINIC | Age: 81
End: 2021-01-20

## 2021-01-20 ENCOUNTER — LAB (OUTPATIENT)
Dept: LAB | Facility: HOSPITAL | Age: 81
End: 2021-01-20

## 2021-01-20 DIAGNOSIS — I48.0 PAROXYSMAL ATRIAL FIBRILLATION (HCC): ICD-10-CM

## 2021-01-20 LAB
INR PPP: 2 (ref 0.9–1.1)
INR PPP: 2 (ref 2–3)
PROTHROMBIN TIME: 22.5 SECONDS (ref 11.9–14.1)

## 2021-01-20 PROCEDURE — 85610 PROTHROMBIN TIME: CPT

## 2021-01-20 PROCEDURE — 36415 COLL VENOUS BLD VENIPUNCTURE: CPT

## 2021-01-20 NOTE — TELEPHONE ENCOUNTER
INR 2.0 DRAWN TODAY. NO DOSAGE CHANGE AND RECHECK LEVEL IN 1 MO.  PER SHELLI HENDRICKSON, PAC. VERBAL ORDERS READ BACK AND VERIFIED BY SHELLI HENDRICKSON, PAC. PATIENT AWARE VERBALIZED OK. PH,LPN

## 2021-02-02 ENCOUNTER — TELEPHONE (OUTPATIENT)
Dept: CARDIOLOGY | Facility: CLINIC | Age: 81
End: 2021-02-02

## 2021-02-02 NOTE — TELEPHONE ENCOUNTER
"Patient to seen Monday morning by Edilson. Estefania Pierson MA      Patient offered appointment for every day this week however she is not available. She request something next week. She states this has been going on for sometime and she is not concerned about getting in ASAP. Advised I will discuss don Waggoner tomorrow as he is out today and give her a call back.     ----- Message from Charleen Jorgensen LPN sent at 2/1/2021 12:44 PM EST -----  Called  regarding INR results, voiced concerns with wife. She got on the phone. States at rest with no activity she is fine, but with minimal activity/exertion, she will have severe shortness of breath, fatigue (sometimes has to lay down for a couple of hours), chest tightness (like a crushing sensation) and her heart \"fly's away with me\". She states she doesn't know what is wrong with her. Told her you would talk with Edilson and call her back. Thanks, Pat      "

## 2021-02-05 ENCOUNTER — OFFICE VISIT (OUTPATIENT)
Dept: CARDIOLOGY | Facility: CLINIC | Age: 81
End: 2021-02-05

## 2021-02-05 DIAGNOSIS — R07.2 PRECORDIAL PAIN: ICD-10-CM

## 2021-02-05 DIAGNOSIS — R06.02 SHORTNESS OF BREATH: ICD-10-CM

## 2021-02-05 DIAGNOSIS — I10 ESSENTIAL HYPERTENSION: Primary | ICD-10-CM

## 2021-02-05 DIAGNOSIS — R00.2 PALPITATIONS: ICD-10-CM

## 2021-02-05 DIAGNOSIS — I48.0 PAROXYSMAL ATRIAL FIBRILLATION (HCC): ICD-10-CM

## 2021-02-05 PROCEDURE — 93000 ELECTROCARDIOGRAM COMPLETE: CPT | Performed by: PHYSICIAN ASSISTANT

## 2021-02-05 PROCEDURE — 93288 INTERROG EVL PM/LDLS PM IP: CPT | Performed by: INTERNAL MEDICINE

## 2021-02-05 NOTE — PROGRESS NOTES
Patient presents for pacer check stating she has been having chest discomfort. Verbal order per Edilson Souza PA-C for EKG and nuclear stress test. Advised patient I will call when stress test results are available. Estefania Pierson MA

## 2021-02-17 DIAGNOSIS — I25.84 CORONARY ARTERY DISEASE DUE TO CALCIFIED CORONARY LESION: ICD-10-CM

## 2021-02-17 DIAGNOSIS — E78.2 MIXED HYPERLIPIDEMIA: ICD-10-CM

## 2021-02-17 DIAGNOSIS — I25.10 CORONARY ARTERY DISEASE DUE TO CALCIFIED CORONARY LESION: ICD-10-CM

## 2021-02-17 RX ORDER — EVOLOCUMAB 420 MG/3.5
420 KIT SUBCUTANEOUS
Qty: 1 CARTRIDGE | Refills: 11 | Status: SHIPPED | OUTPATIENT
Start: 2021-02-17 | End: 2021-11-18

## 2021-02-17 NOTE — TELEPHONE ENCOUNTER
Pt is concerned of getting her [facility]  Advised patient that we will PA on this med once the pharmacy sends us something.       Patient was concerned about her husbands health as well, presenting with anxiousness on the phone. I advised her to call PCP to talk with his dr about this for further instructions.       Lolly Pritchard MA

## 2021-02-18 ENCOUNTER — PRIOR AUTHORIZATION (OUTPATIENT)
Dept: CARDIOLOGY | Facility: CLINIC | Age: 81
End: 2021-02-18

## 2021-03-07 DIAGNOSIS — I48.91 ATRIAL FIBRILLATION, UNSPECIFIED TYPE (HCC): ICD-10-CM

## 2021-03-08 RX ORDER — WARFARIN SODIUM 5 MG/1
TABLET ORAL
Qty: 90 TABLET | Refills: 1 | Status: SHIPPED | OUTPATIENT
Start: 2021-03-08 | End: 2021-08-06

## 2021-03-16 ENCOUNTER — LAB (OUTPATIENT)
Dept: LAB | Facility: HOSPITAL | Age: 81
End: 2021-03-16

## 2021-03-16 DIAGNOSIS — I48.0 PAROXYSMAL ATRIAL FIBRILLATION (HCC): ICD-10-CM

## 2021-03-16 LAB
INR PPP: 1.35 (ref 0.9–1.1)
INR PPP: 1.35 (ref 2–3)
PROTHROMBIN TIME: 16.5 SECONDS (ref 11.9–14.1)

## 2021-03-16 PROCEDURE — 36415 COLL VENOUS BLD VENIPUNCTURE: CPT

## 2021-03-16 PROCEDURE — 85610 PROTHROMBIN TIME: CPT

## 2021-03-17 ENCOUNTER — TELEPHONE (OUTPATIENT)
Dept: CARDIOLOGY | Facility: CLINIC | Age: 81
End: 2021-03-17

## 2021-03-17 NOTE — TELEPHONE ENCOUNTER
INR 1.35 DRAWN YEST. STATES SHE MISSED ONE DOSE.  RECENTLY PASSED AWAY. PER SHELLI HENDRICKSON, PAC TAKE 7.5 MG TODAY AND JODI. THEN RESUME PREVIOUS DOSING AND RECHECK LEVEL IN 1 WEEK. VERBAL ORDERS READ BACK AND VERIFIED BY NILESH PAINTING. PATIENT AWARE, VERBALIZED OK. PH,LPN

## 2021-03-23 ENCOUNTER — ANTICOAGULATION VISIT (OUTPATIENT)
Dept: CARDIOLOGY | Facility: CLINIC | Age: 81
End: 2021-03-23

## 2021-03-23 ENCOUNTER — LAB (OUTPATIENT)
Dept: LAB | Facility: HOSPITAL | Age: 81
End: 2021-03-23

## 2021-03-23 DIAGNOSIS — I48.0 PAROXYSMAL ATRIAL FIBRILLATION (HCC): ICD-10-CM

## 2021-03-23 LAB
INR PPP: 2.6 (ref 0.9–1.1)
INR PPP: 2.6 (ref 2–3)
PROTHROMBIN TIME: 27.7 SECONDS (ref 11.9–14.1)

## 2021-03-23 PROCEDURE — 36415 COLL VENOUS BLD VENIPUNCTURE: CPT

## 2021-03-23 PROCEDURE — 85610 PROTHROMBIN TIME: CPT

## 2021-03-24 ENCOUNTER — ANTICOAGULATION VISIT (OUTPATIENT)
Dept: CARDIOLOGY | Facility: CLINIC | Age: 81
End: 2021-03-24

## 2021-03-24 ENCOUNTER — TELEPHONE (OUTPATIENT)
Dept: CARDIOLOGY | Facility: CLINIC | Age: 81
End: 2021-03-24

## 2021-03-24 NOTE — TELEPHONE ENCOUNTER
INR 2.6 DRAWN YEST. NO DOSAGE CHANGE AND RECHECK LEVEL IN 2 WEEKS PER NILESH PAINTING. VERBAL ORDERS READ BACK AND VERIFIED BY NILESH PAINTING. PATIENT AWARE VERBALIZED OK. ANUSHKA GARCIA        ----- Message from Inge Velez LPN sent at 3/24/2021  9:36 AM EDT -----    ----- Message -----  From: Lab, Background User  Sent: 3/23/2021   7:43 PM EDT  To: Stanley Chacko [facility]

## 2021-03-24 NOTE — PROGRESS NOTES
INR 2.6 DRAWN YEST. NO DOSAGE CHANGE AND RECHECK LEVEL IN 2 WEEKS PER SHELLI HENDRICKSON, PAC. VERBAL ORDERS READ BACK AND VERIFIED BY SHELLI HENDRICKSON, PAC. PATIENT AWARE VERBALIZED OK. PH,LPN

## 2021-04-08 ENCOUNTER — LAB (OUTPATIENT)
Dept: LAB | Facility: HOSPITAL | Age: 81
End: 2021-04-08

## 2021-04-08 DIAGNOSIS — I48.0 PAROXYSMAL ATRIAL FIBRILLATION (HCC): ICD-10-CM

## 2021-04-08 LAB
INR PPP: 2.13 (ref 0.9–1.1)
INR PPP: 2.13 (ref 2–3)
PROTHROMBIN TIME: 23.7 SECONDS (ref 11.9–14.1)

## 2021-04-08 PROCEDURE — 36415 COLL VENOUS BLD VENIPUNCTURE: CPT

## 2021-04-08 PROCEDURE — 85610 PROTHROMBIN TIME: CPT

## 2021-04-09 ENCOUNTER — ANTICOAGULATION VISIT (OUTPATIENT)
Dept: CARDIOLOGY | Facility: CLINIC | Age: 81
End: 2021-04-09

## 2021-04-09 ENCOUNTER — TELEPHONE (OUTPATIENT)
Dept: CARDIOLOGY | Facility: CLINIC | Age: 81
End: 2021-04-09

## 2021-04-09 NOTE — TELEPHONE ENCOUNTER
INR 2.13 DRAWN YEST. NO DOSAGE CHANGE AND RECHECK LEVEL IN 1 MO.  PER SHELLI HENDRICKSON, PAC. VERBAL ORDERS READ BACK AND VERIFIED BY SHELLI HENDRICKSON, PAC. PATIENT AWARE VERBALIZED OK. PH,LPN

## 2021-05-11 ENCOUNTER — TRANSCRIBE ORDERS (OUTPATIENT)
Dept: LAB | Facility: HOSPITAL | Age: 81
End: 2021-05-11

## 2021-05-11 ENCOUNTER — LAB (OUTPATIENT)
Dept: LAB | Facility: HOSPITAL | Age: 81
End: 2021-05-11

## 2021-05-11 ENCOUNTER — ANTICOAGULATION VISIT (OUTPATIENT)
Dept: CARDIOLOGY | Facility: CLINIC | Age: 81
End: 2021-05-11

## 2021-05-11 ENCOUNTER — TELEPHONE (OUTPATIENT)
Dept: CARDIOLOGY | Facility: CLINIC | Age: 81
End: 2021-05-11

## 2021-05-11 DIAGNOSIS — E11.9 DIABETES MELLITUS WITHOUT COMPLICATION (HCC): ICD-10-CM

## 2021-05-11 DIAGNOSIS — I10 ESSENTIAL HYPERTENSION, MALIGNANT: ICD-10-CM

## 2021-05-11 DIAGNOSIS — R20.2 PARESTHESIA: ICD-10-CM

## 2021-05-11 DIAGNOSIS — E78.5 HYPERLIPIDEMIA, UNSPECIFIED HYPERLIPIDEMIA TYPE: ICD-10-CM

## 2021-05-11 DIAGNOSIS — I48.0 PAROXYSMAL ATRIAL FIBRILLATION (HCC): ICD-10-CM

## 2021-05-11 DIAGNOSIS — E55.9 VITAMIN D DEFICIENCY, UNSPECIFIED: ICD-10-CM

## 2021-05-11 DIAGNOSIS — I10 ESSENTIAL HYPERTENSION, MALIGNANT: Primary | ICD-10-CM

## 2021-05-11 LAB — INR PPP: 2.07 (ref 2–3)

## 2021-05-11 PROCEDURE — 82043 UR ALBUMIN QUANTITATIVE: CPT

## 2021-05-11 PROCEDURE — 81001 URINALYSIS AUTO W/SCOPE: CPT

## 2021-05-11 PROCEDURE — 80053 COMPREHEN METABOLIC PANEL: CPT

## 2021-05-11 PROCEDURE — 82306 VITAMIN D 25 HYDROXY: CPT

## 2021-05-11 PROCEDURE — 83036 HEMOGLOBIN GLYCOSYLATED A1C: CPT

## 2021-05-11 PROCEDURE — 84443 ASSAY THYROID STIM HORMONE: CPT

## 2021-05-11 PROCEDURE — 84479 ASSAY OF THYROID (T3 OR T4): CPT

## 2021-05-11 PROCEDURE — 85027 COMPLETE CBC AUTOMATED: CPT

## 2021-05-11 PROCEDURE — 82746 ASSAY OF FOLIC ACID SERUM: CPT

## 2021-05-11 PROCEDURE — 36415 COLL VENOUS BLD VENIPUNCTURE: CPT

## 2021-05-11 PROCEDURE — 84436 ASSAY OF TOTAL THYROXINE: CPT

## 2021-05-11 PROCEDURE — 85610 PROTHROMBIN TIME: CPT

## 2021-05-11 PROCEDURE — 82607 VITAMIN B-12: CPT

## 2021-05-11 PROCEDURE — 80061 LIPID PANEL: CPT

## 2021-05-11 NOTE — TELEPHONE ENCOUNTER
INR 20.7 DRAWN TODAY. NO DOSAGE CHANGE AND RECHECK LEVEL IN 1 MO.  PER SHELLI HENDRICKSON, PAC. VERBAL ORDERS READ BACK AND VERIFIED BY SHELLI HENDRICKSON, PAC. PATIENT AWARE VERBALIZED OK. ANUSHKA GARCIA          ----- Message from TRU Robins sent at 5/11/2021  3:13 PM EDT -----  inr

## 2021-05-11 NOTE — PROGRESS NOTES
INR 20.7 DRAWN TODAY. NO DOSAGE CHANGE AND RECHECK LEVEL IN 1 MO.  PER SHELLI HENDRICKSON, PAC. VERBAL ORDERS READ BACK AND VERIFIED BY SHELLI HENDRICKSON, PAC. PATIENT AWARE VERBALIZED OK. PH,LPN

## 2021-06-07 ENCOUNTER — LAB (OUTPATIENT)
Dept: LAB | Facility: HOSPITAL | Age: 81
End: 2021-06-07

## 2021-06-07 ENCOUNTER — TELEPHONE (OUTPATIENT)
Dept: CARDIOLOGY | Facility: CLINIC | Age: 81
End: 2021-06-07

## 2021-06-07 ENCOUNTER — ANTICOAGULATION VISIT (OUTPATIENT)
Dept: CARDIOLOGY | Facility: CLINIC | Age: 81
End: 2021-06-07

## 2021-06-07 DIAGNOSIS — I48.0 PAROXYSMAL ATRIAL FIBRILLATION (HCC): ICD-10-CM

## 2021-06-07 LAB
INR PPP: 1.77 (ref 0.9–1.1)
INR PPP: 1.77 (ref 2–3)
PROTHROMBIN TIME: 21.1 SECONDS (ref 12.8–14.5)

## 2021-06-07 PROCEDURE — 85610 PROTHROMBIN TIME: CPT

## 2021-06-07 PROCEDURE — 36415 COLL VENOUS BLD VENIPUNCTURE: CPT

## 2021-06-07 NOTE — TELEPHONE ENCOUNTER
Patient called office wanting Edilson Souza's PA opinion on getting covid vaccine. Patient states her sister is currently in [facility] and just had been moved from the covid floor. Stated the hospital asked if she had her covid  Vaccine, she told them she had not, they recommended she have it and not visit patient is she did not have vaccine. Per Edilson DOS SANTOS, patient informed he recommends her to receive covid vaccine. Patient verbalized understanding. Inge Velez LPN.

## 2021-06-07 NOTE — TELEPHONE ENCOUNTER
INR 1.77 DRAWN TODAY. STATES SHE MISSED A DOSE. PER NILESH PAINTING TAKE AN EXTRA 2.5 MG TAB TODAY THEN RESUME PREVIOUS DOSING AND RECHECK LEVEL IN 2 WEEKS. VERBAL ORDERS READ BACK AND VERIFIED BY NILESH PAINTING. PATIENT AWARE, VERBALIZED OK. ANUSHKA GARCIA          ----- Message from Zandra Zaldivar sent at 6/7/2021  4:39 PM EDT -----    ----- Message -----  From: Lab, Background User  Sent: 6/7/2021   3:02 PM EDT  To: Mge Card [facility]

## 2021-06-07 NOTE — PROGRESS NOTES
INR 1.77 DRAWN TODAY. STATES SHE MISSED A DOSE. PER SHELLI HENDRICKSON, PAC TAKE AN EXTRA 2.5 MG TAB TODAY THEN RESUME PREVIOUS DOSING AND RECHECK LEVEL IN 2 WEEKS. VERBAL ORDERS READ BACK AND VERIFIED BY NILESH PAINTING. PATIENT AWARE, VERBALIZED OK. PH,LPN

## 2021-06-17 DIAGNOSIS — I10 ESSENTIAL HYPERTENSION: ICD-10-CM

## 2021-06-17 RX ORDER — AMLODIPINE BESYLATE 5 MG/1
TABLET ORAL
Qty: 90 TABLET | Refills: 0 | Status: SHIPPED | OUTPATIENT
Start: 2021-06-17 | End: 2021-09-24

## 2021-06-21 ENCOUNTER — ANTICOAGULATION VISIT (OUTPATIENT)
Dept: CARDIOLOGY | Facility: CLINIC | Age: 81
End: 2021-06-21

## 2021-06-21 ENCOUNTER — LAB (OUTPATIENT)
Dept: LAB | Facility: HOSPITAL | Age: 81
End: 2021-06-21

## 2021-06-21 DIAGNOSIS — I48.0 PAROXYSMAL ATRIAL FIBRILLATION (HCC): ICD-10-CM

## 2021-06-21 DIAGNOSIS — I10 ESSENTIAL HYPERTENSION: ICD-10-CM

## 2021-06-21 DIAGNOSIS — R07.2 PRECORDIAL PAIN: Primary | ICD-10-CM

## 2021-06-21 DIAGNOSIS — R07.2 PRECORDIAL PAIN: ICD-10-CM

## 2021-06-21 DIAGNOSIS — I48.0 PAROXYSMAL ATRIAL FIBRILLATION (HCC): Primary | ICD-10-CM

## 2021-06-21 LAB
INR PPP: 1.86 (ref 0.9–1.1)
INR PPP: 1.86 (ref 2–3)
PROTHROMBIN TIME: 21.9 SECONDS (ref 12.8–14.5)

## 2021-06-21 PROCEDURE — 36415 COLL VENOUS BLD VENIPUNCTURE: CPT

## 2021-06-21 PROCEDURE — 85610 PROTHROMBIN TIME: CPT

## 2021-06-21 NOTE — TELEPHONE ENCOUNTER
INR 1.86 DRAWN TODAY. DENIES ANY MISSED DOSES, ABX'S, IS TAKING SEVERAL NEW VITAMINS. PER SHELLI HENDRICKSON, PAC CHANGE TO 7.5 MG ON W'S AND 5 MG ALL OTHER DAYS AND RECHECK LEVEL IN 2 WEEKS. VERBAL ORDERS READ BACK AND VERIFIED BY NILESH PAINTING. PATIENT AWARE, VERBALIZED OK. PH,LPN

## 2021-06-22 RX ORDER — WARFARIN SODIUM 7.5 MG/1
TABLET ORAL
Qty: 30 TABLET | Refills: 11 | Status: SHIPPED | OUTPATIENT
Start: 2021-06-22 | End: 2021-08-06

## 2021-06-24 RX ORDER — CLOPIDOGREL BISULFATE 75 MG/1
TABLET ORAL
Qty: 90 TABLET | Refills: 0 | Status: SHIPPED | OUTPATIENT
Start: 2021-06-24 | End: 2021-10-04

## 2021-07-01 ENCOUNTER — TELEPHONE (OUTPATIENT)
Dept: CARDIOLOGY | Facility: CLINIC | Age: 81
End: 2021-07-01

## 2021-07-01 NOTE — TELEPHONE ENCOUNTER
Per Edilson: unfortunately, we can't direct admit to rehab floor, it has to be done by a hospitalist.       Called and informed pt of the above. She stated that they let her go last time and she suffered. I stated that if they release her and she doesn't feel better, she can go to a different facility and see what they say. She verbalized understanding.

## 2021-07-01 NOTE — TELEPHONE ENCOUNTER
"Radu Zamora [facility] stating that his mother Nanci Pritchard is in ER and they want to speak w/ Pat. Stated pt was in ER Monday and is again right now due to \"heart issues.\" Stated they need to speak w/ Charleen MELÉNDEZ.  #[phone]        Called and asked for  so I could locate pt. She stated she fell at home and hurt herself pretty bad, so she's in ER via ambulance. Stated she's having a lot of chest tightness and pressure. States she's bruised all over. Stated she got up out of bed and fell onto some furniture. Stated she's suffered at Monday. Called ambulance again. Stated Brittany Garrido told her to get to the hospital and that the hospital is doing a bunch of tests. Stated she can't stand up. Stated her chest has so much pressure and it's sore. Stated she's not sure if it's from fall or her heart. I said that she was sorry she injured herself and that we can't do anything while she's being evaluated. She stated that Brittany Garrido wants her admitted to rehab floor and that the hospital said she can't do it. Stated they called us to see if we can try.     I informed her that I haven't had this occur before, so I'm not sure what we're allowed to do. Stated I would have to ask Edilson and maybe my boss, then would call back.   "

## 2021-07-06 LAB — INR PPP: 2.55 (ref 2–3)

## 2021-07-09 ENCOUNTER — TELEPHONE (OUTPATIENT)
Dept: CARDIOLOGY | Facility: CLINIC | Age: 81
End: 2021-07-09

## 2021-07-09 NOTE — TELEPHONE ENCOUNTER
Patients son came into the office asking to speak with Edilson DOS SANTOS     Son stated his mother has been in the ER due to a fall and is now at [facility] nursing and rehab - he said the PT that they are doing for her, is hurting her and making her heart rate and BP go up. He wants to get her out of [facility]  and set up PT from home. He asked if we could call and see about getting her released.     Edilson DOS SANTOS told the son we will call an check her status and find out who the admitting Dr is. He explained we can not go over the admitting Doctors order but we can make him aware  of the situate and condition the son explained.       I called [facility] to talk to the Nurse taking care of the patient she stated the patients son came and singed the patient out on his own accord and took her home.     Edilson DOS SANTOS was made a wear - since patient has been taken home there is nothing else we can do since we were not  practice / provider that admitted her.        AT [facility]

## 2021-07-15 ENCOUNTER — TELEPHONE (OUTPATIENT)
Dept: CARDIOLOGY | Facility: CLINIC | Age: 81
End: 2021-07-15

## 2021-07-16 ENCOUNTER — ANTICOAGULATION VISIT (OUTPATIENT)
Dept: CARDIOLOGY | Facility: CLINIC | Age: 81
End: 2021-07-16

## 2021-07-16 NOTE — PROGRESS NOTES
INR 2.55 DRAWN AT [facility] ON 7-6-2021. NO DOSAGE CHANGE AND RECHECK LEVEL IN 2-3 WEEKS PER SHELLI HENDRICKSON, PAC. VERBAL ORDERS READ BACK AND VERIFIED BY NILESH PAINTING. PATIENT AWARE VERBALIZED OK. RADHA,LPN

## 2021-07-22 ENCOUNTER — PRIOR AUTHORIZATION (OUTPATIENT)
Dept: CARDIOLOGY | Facility: CLINIC | Age: 81
End: 2021-07-22

## 2021-07-29 ENCOUNTER — LAB (OUTPATIENT)
Dept: LAB | Facility: HOSPITAL | Age: 81
End: 2021-07-29

## 2021-07-29 ENCOUNTER — TELEPHONE (OUTPATIENT)
Dept: CARDIOLOGY | Facility: CLINIC | Age: 81
End: 2021-07-29

## 2021-07-29 DIAGNOSIS — I48.91 ATRIAL FIBRILLATION, UNSPECIFIED TYPE (HCC): Primary | ICD-10-CM

## 2021-07-29 DIAGNOSIS — I48.91 ATRIAL FIBRILLATION, UNSPECIFIED TYPE (HCC): ICD-10-CM

## 2021-07-29 LAB
INR PPP: 2.03 (ref 0.9–1.1)
PROTHROMBIN TIME: 23.4 SECONDS (ref 12.8–14.5)

## 2021-07-29 PROCEDURE — 85610 PROTHROMBIN TIME: CPT

## 2021-07-29 PROCEDURE — 36415 COLL VENOUS BLD VENIPUNCTURE: CPT

## 2021-07-30 ENCOUNTER — TELEPHONE (OUTPATIENT)
Dept: CARDIOLOGY | Facility: CLINIC | Age: 81
End: 2021-07-30

## 2021-07-30 ENCOUNTER — ANTICOAGULATION VISIT (OUTPATIENT)
Dept: CARDIOLOGY | Facility: CLINIC | Age: 81
End: 2021-07-30

## 2021-07-30 LAB — INR PPP: 2.03 (ref 2–3)

## 2021-07-30 NOTE — TELEPHONE ENCOUNTER
INR 2.03 DRAWN YEST. NO DOSAGE CHANGE AND RECHECK LEVEL IN 1 MO.  PER SHELLI HENDRICKSON, PAC. VERBAL ORDERS READ BACK AND VERIFIED BY SHELLI HENDRICKSON, PAC. PATIENT AWARE VERBALIZED OK. PH,LPN

## 2021-08-06 ENCOUNTER — OFFICE VISIT (OUTPATIENT)
Dept: CARDIOLOGY | Facility: CLINIC | Age: 81
End: 2021-08-06

## 2021-08-06 VITALS
DIASTOLIC BLOOD PRESSURE: 66 MMHG | HEIGHT: 55 IN | WEIGHT: 147 LBS | OXYGEN SATURATION: 97 % | HEART RATE: 63 BPM | BODY MASS INDEX: 34.02 KG/M2 | SYSTOLIC BLOOD PRESSURE: 119 MMHG

## 2021-08-06 DIAGNOSIS — R07.2 PRECORDIAL PAIN: Primary | ICD-10-CM

## 2021-08-06 DIAGNOSIS — R06.02 SHORTNESS OF BREATH: ICD-10-CM

## 2021-08-06 DIAGNOSIS — I25.84 CORONARY ARTERY DISEASE DUE TO CALCIFIED CORONARY LESION: ICD-10-CM

## 2021-08-06 DIAGNOSIS — I48.0 PAROXYSMAL ATRIAL FIBRILLATION (HCC): ICD-10-CM

## 2021-08-06 DIAGNOSIS — R00.2 PALPITATIONS: ICD-10-CM

## 2021-08-06 DIAGNOSIS — I25.10 CORONARY ARTERY DISEASE DUE TO CALCIFIED CORONARY LESION: ICD-10-CM

## 2021-08-06 DIAGNOSIS — I49.5 SICK SINUS SYNDROME (HCC): Primary | ICD-10-CM

## 2021-08-06 PROCEDURE — 99214 OFFICE O/P EST MOD 30 MIN: CPT | Performed by: PHYSICIAN ASSISTANT

## 2021-08-06 PROCEDURE — 93280 PM DEVICE PROGR EVAL DUAL: CPT | Performed by: INTERNAL MEDICINE

## 2021-08-06 RX ORDER — TRAMADOL HYDROCHLORIDE 50 MG/1
50 TABLET ORAL EVERY 6 HOURS PRN
COMMUNITY
End: 2022-02-08

## 2021-08-06 NOTE — PATIENT INSTRUCTIONS

## 2021-08-16 ENCOUNTER — HOSPITAL ENCOUNTER (OUTPATIENT)
Dept: CARDIOLOGY | Facility: HOSPITAL | Age: 81
Discharge: HOME | End: 2021-08-16

## 2021-08-16 DIAGNOSIS — R07.2 PRECORDIAL PAIN: ICD-10-CM

## 2021-08-16 DIAGNOSIS — R06.02 SHORTNESS OF BREATH: ICD-10-CM

## 2021-08-16 DIAGNOSIS — R00.2 PALPITATIONS: ICD-10-CM

## 2021-08-16 PROCEDURE — A9500 TC99M SESTAMIBI: HCPCS | Performed by: INTERNAL MEDICINE

## 2021-08-16 PROCEDURE — 78452 HT MUSCLE IMAGE SPECT MULT: CPT

## 2021-08-16 PROCEDURE — 93018 CV STRESS TEST I&R ONLY: CPT | Performed by: INTERNAL MEDICINE

## 2021-08-16 PROCEDURE — 93017 CV STRESS TEST TRACING ONLY: CPT

## 2021-08-16 PROCEDURE — 78452 HT MUSCLE IMAGE SPECT MULT: CPT | Performed by: INTERNAL MEDICINE

## 2021-08-16 PROCEDURE — 0 TECHNETIUM SESTAMIBI: Performed by: INTERNAL MEDICINE

## 2021-08-16 PROCEDURE — 25010000002 REGADENOSON 0.4 MG/5ML SOLUTION: Performed by: INTERNAL MEDICINE

## 2021-08-16 RX ADMIN — TECHNETIUM TC 99M SESTAMIBI 1 DOSE: 1 INJECTION INTRAVENOUS at 10:41

## 2021-08-16 RX ADMIN — TECHNETIUM TC 99M SESTAMIBI 1 DOSE: 1 INJECTION INTRAVENOUS at 11:39

## 2021-08-16 RX ADMIN — REGADENOSON 0.4 MG: 0.08 INJECTION, SOLUTION INTRAVENOUS at 11:39

## 2021-08-17 LAB
BH CV REST NUCLEAR ISOTOPE DOSE: 10 MCI
BH CV STRESS COMMENTS STAGE 1: NORMAL
BH CV STRESS DOSE REGADENOSON STAGE 1: 0.4
BH CV STRESS DURATION MIN STAGE 1: 0
BH CV STRESS DURATION SEC STAGE 1: 10
BH CV STRESS NUCLEAR ISOTOPE DOSE: 30 MCI
BH CV STRESS PROTOCOL 1: NORMAL
BH CV STRESS RECOVERY BP: NORMAL MMHG
BH CV STRESS RECOVERY HR: 73 BPM
BH CV STRESS STAGE 1: 1
MAXIMAL PREDICTED HEART RATE: 141 BPM
PERCENT MAX PREDICTED HR: 53.9 %
STRESS BASELINE BP: NORMAL MMHG
STRESS BASELINE HR: 65 BPM
STRESS PERCENT HR: 63 %
STRESS POST PEAK BP: NORMAL MMHG
STRESS POST PEAK HR: 76 BPM
STRESS TARGET HR: 120 BPM

## 2021-08-18 ENCOUNTER — TELEPHONE (OUTPATIENT)
Dept: CARDIOLOGY | Facility: CLINIC | Age: 81
End: 2021-08-18

## 2021-08-18 NOTE — TELEPHONE ENCOUNTER
Attempted to call patient at both numbers     Stress test results - heart pump function good no indication of abnormalities    Keep routine follow up       AT [facility]        ----- Message from RAYA Louie sent at 8/17/2021  8:47 AM EDT -----  Routine follow-up.

## 2021-08-19 NOTE — TELEPHONE ENCOUNTER
Patient called triage returning call from yesterday.      Attempted to call patient back no answer / vm       AT [facility]

## 2021-09-24 DIAGNOSIS — I10 ESSENTIAL HYPERTENSION: ICD-10-CM

## 2021-09-24 RX ORDER — AMLODIPINE BESYLATE 5 MG/1
TABLET ORAL
Qty: 90 TABLET | Refills: 0 | Status: SHIPPED | OUTPATIENT
Start: 2021-09-24 | End: 2021-12-24

## 2021-09-24 NOTE — TELEPHONE ENCOUNTER
Patient called office stating her blood sugar has been running high, up to 400 at times. She wanted to know if Eliquis could be the cause. States she did leave message with nurse of her pcp Brittany OTT as well. Per Edilson DOS SANTOS Eliquis would not raise her blood sugar levels. She stated she did speak with Brittany Hendrix office again, they are adding a new medication to help with her blood sugar, she was unsure of name of medication. Inge Velez LPN

## 2021-09-28 ENCOUNTER — TELEPHONE (OUTPATIENT)
Dept: CARDIOLOGY | Facility: CLINIC | Age: 81
End: 2021-09-28

## 2021-09-28 NOTE — TELEPHONE ENCOUNTER
Pt LVM on the triage line requesting a return call.    The PM returned pt's call.  Ms. Pritchard inquired if we can clear her to have a colonoscopy performed.  The PM explained the Gastro office needs to fax us a cardiac clearance request.  The PM volunteered to call the office and explain what's needed.  The Pt verbalized an understanding.    The PM spoke with Brittany Garrido's nurse and she said they do not have a referral for a colonoscopy for Ms. Pritchard.

## 2021-10-04 RX ORDER — CLOPIDOGREL BISULFATE 75 MG/1
TABLET ORAL
Qty: 90 TABLET | Refills: 0 | Status: SHIPPED | OUTPATIENT
Start: 2021-10-04 | End: 2022-01-10

## 2021-10-29 DIAGNOSIS — I10 ESSENTIAL HYPERTENSION: ICD-10-CM

## 2021-10-29 RX ORDER — LISINOPRIL 20 MG/1
TABLET ORAL
Qty: 180 TABLET | Refills: 0 | Status: SHIPPED | OUTPATIENT
Start: 2021-10-29 | End: 2022-01-28

## 2021-11-08 ENCOUNTER — LAB (OUTPATIENT)
Dept: LAB | Facility: HOSPITAL | Age: 81
End: 2021-11-08

## 2021-11-08 DIAGNOSIS — I48.91 ATRIAL FIBRILLATION, UNSPECIFIED TYPE (HCC): ICD-10-CM

## 2021-11-08 LAB
INR PPP: 0.97 (ref 0.9–1.1)
PROTHROMBIN TIME: 13.3 SECONDS (ref 12.8–14.5)

## 2021-11-08 PROCEDURE — 85610 PROTHROMBIN TIME: CPT

## 2021-11-08 PROCEDURE — 36415 COLL VENOUS BLD VENIPUNCTURE: CPT

## 2021-11-09 ENCOUNTER — TELEPHONE (OUTPATIENT)
Dept: CARDIOLOGY | Facility: CLINIC | Age: 81
End: 2021-11-09

## 2021-11-09 ENCOUNTER — ANTICOAGULATION VISIT (OUTPATIENT)
Dept: CARDIOLOGY | Facility: CLINIC | Age: 81
End: 2021-11-09

## 2021-11-09 NOTE — TELEPHONE ENCOUNTER
Pt contacts office stating she is suppose to be taking Eliquis daily.  She was unable to find her medicine.  She called her pharmacy and learned she had not picked up her prescription in over a month and half.  She is going to  today.  Pt made aware of risk due to skipping her Eliquis.   Verbalizes understanding.

## 2021-11-09 NOTE — TELEPHONE ENCOUNTER
APPARENTLY PATIENT WAS SWITCHED TO ELIQUIS FROM COUMADIN AT 8-6-2021 VISIT, UNKNOWN TO ME, SO INR DRAWN NOT NEEDED. PH,LPN

## 2021-11-16 ENCOUNTER — TELEPHONE (OUTPATIENT)
Dept: CARDIOLOGY | Facility: CLINIC | Age: 81
End: 2021-11-16

## 2021-11-16 NOTE — TELEPHONE ENCOUNTER
Pt called and wanted to notify us there will be a clearance being sent to us from [place] (Ordered by pcp Brittany Garrido) Notified pt once we receive clearance, Edilson will review and we will fax clearnace back to facility. Pt wasn't sure where the clearance is at but its due to poss Hemorrhoids.

## 2021-11-18 DIAGNOSIS — I25.84 CORONARY ARTERY DISEASE DUE TO CALCIFIED CORONARY LESION: ICD-10-CM

## 2021-11-18 DIAGNOSIS — E78.2 MIXED HYPERLIPIDEMIA: ICD-10-CM

## 2021-11-18 DIAGNOSIS — I25.10 CORONARY ARTERY DISEASE DUE TO CALCIFIED CORONARY LESION: ICD-10-CM

## 2021-11-18 RX ORDER — EVOLOCUMAB 420 MG/3.5
KIT SUBCUTANEOUS
Qty: 4 ML | Refills: 0 | Status: SHIPPED | OUTPATIENT
Start: 2021-11-18 | End: 2022-01-25

## 2021-11-24 ENCOUNTER — TELEPHONE (OUTPATIENT)
Dept: CARDIOLOGY | Facility: CLINIC | Age: 81
End: 2021-11-24

## 2021-11-24 NOTE — TELEPHONE ENCOUNTER
Pt called c/o lots of bleeding from Rectal Area, is very weak. I stated to pt she needs to go to ER ASAP. She verbally understands, states son is on his way to get her. I spoke with Son Radu ([facility]) He is heading there and will be taking her to the ER, will call us and notify once she is seen,

## 2021-11-24 NOTE — TELEPHONE ENCOUNTER
Ms. Pritchard called back to let provider know that her bleeding was caused by  A Hemorid and that her blood count was minimally low but not enough to need blood. She was told to keep upcoming appointment in [place] to address issue.

## 2021-12-01 ENCOUNTER — TELEPHONE (OUTPATIENT)
Dept: CARDIOLOGY | Facility: CLINIC | Age: 81
End: 2021-12-01

## 2021-12-01 NOTE — TELEPHONE ENCOUNTER
Spoke with pt and she states she was to tell us she needs to hol blood thinners for colonoscopt Dec 8.      I called [facility] office- Dr. Bg Amezquita, told office for anything we hold we have to have a clearance before hand. They verbally understand will send it over

## 2021-12-02 ENCOUNTER — TELEPHONE (OUTPATIENT)
Dept: CARDIOLOGY | Facility: CLINIC | Age: 81
End: 2021-12-02

## 2021-12-02 NOTE — TELEPHONE ENCOUNTER
Tried to contact Dr. Bg Amezquita, regarding they was suppose to fax clearance yesterday still have not, they sent me to  and told me his nurse would return my call.     Left pt message, no answer bc she is concerned about this.

## 2021-12-02 NOTE — TELEPHONE ENCOUNTER
Received cardiac clearance request from  stating pt has colonoscopy scheduled for 12/08/2021 and is requiring a cardiac clearance. Placed cardiac clearance request in Estefania's inbox to review and address with provider.

## 2021-12-06 ENCOUNTER — PRIOR AUTHORIZATION (OUTPATIENT)
Dept: CARDIOLOGY | Facility: CLINIC | Age: 81
End: 2021-12-06

## 2021-12-06 NOTE — TELEPHONE ENCOUNTER
Request Reference Number: [id number]. ATHA MEI /3.5 is approved through 12/31/2022. Estefania Pierson MA

## 2021-12-10 ENCOUNTER — TELEPHONE (OUTPATIENT)
Dept: CARDIOLOGY | Facility: CLINIC | Age: 81
End: 2021-12-10

## 2021-12-10 NOTE — TELEPHONE ENCOUNTER
Pt called yesterday and wanted to know if she should continue to hold any meds still after colonoscopy. PT VERBALLY UNDERSTANDS TO START ALL HELD MEDICATIONS BACK RIGHT AFTER SURGERY. PER SHELLI HENDRICKSON.

## 2021-12-20 DIAGNOSIS — I48.0 PAROXYSMAL ATRIAL FIBRILLATION (HCC): ICD-10-CM

## 2021-12-20 DIAGNOSIS — I10 ESSENTIAL HYPERTENSION: ICD-10-CM

## 2021-12-20 DIAGNOSIS — R00.2 PALPITATIONS: ICD-10-CM

## 2021-12-20 RX ORDER — METOPROLOL TARTRATE 50 MG/1
50 TABLET, FILM COATED ORAL 2 TIMES DAILY
Qty: 180 TABLET | Refills: 3 | Status: SHIPPED | OUTPATIENT
Start: 2021-12-20 | End: 2022-12-28

## 2021-12-24 DIAGNOSIS — I10 ESSENTIAL HYPERTENSION: ICD-10-CM

## 2021-12-24 RX ORDER — AMLODIPINE BESYLATE 5 MG/1
TABLET ORAL
Qty: 90 TABLET | Refills: 1 | Status: SHIPPED | OUTPATIENT
Start: 2021-12-24 | End: 2022-06-20

## 2022-01-10 DIAGNOSIS — I25.10 CORONARY ARTERY DISEASE DUE TO CALCIFIED CORONARY LESION: Primary | ICD-10-CM

## 2022-01-10 DIAGNOSIS — I25.84 CORONARY ARTERY DISEASE DUE TO CALCIFIED CORONARY LESION: Primary | ICD-10-CM

## 2022-01-10 RX ORDER — CLOPIDOGREL BISULFATE 75 MG/1
TABLET ORAL
Qty: 90 TABLET | Refills: 1 | Status: SHIPPED | OUTPATIENT
Start: 2022-01-10 | End: 2022-03-07

## 2022-01-21 DIAGNOSIS — I25.10 CORONARY ARTERY DISEASE DUE TO CALCIFIED CORONARY LESION: ICD-10-CM

## 2022-01-21 DIAGNOSIS — E78.2 MIXED HYPERLIPIDEMIA: ICD-10-CM

## 2022-01-21 DIAGNOSIS — I25.84 CORONARY ARTERY DISEASE DUE TO CALCIFIED CORONARY LESION: ICD-10-CM

## 2022-01-25 RX ORDER — EVOLOCUMAB 420 MG/3.5
KIT SUBCUTANEOUS
Qty: 4 ML | Refills: 1 | Status: SHIPPED | OUTPATIENT
Start: 2022-01-25 | End: 2022-01-31

## 2022-01-28 DIAGNOSIS — I10 ESSENTIAL HYPERTENSION: ICD-10-CM

## 2022-01-28 RX ORDER — LISINOPRIL 20 MG/1
TABLET ORAL
Qty: 180 TABLET | Refills: 0 | Status: SHIPPED | OUTPATIENT
Start: 2022-01-28 | End: 2022-04-25

## 2022-01-31 RX ORDER — EVOLOCUMAB 420 MG/3.5
420 KIT SUBCUTANEOUS
Qty: 1 CARTRIDGE | Refills: 11 | Status: SHIPPED | OUTPATIENT
Start: 2022-01-31 | End: 2022-04-28

## 2022-02-08 ENCOUNTER — OFFICE VISIT (OUTPATIENT)
Dept: CARDIOLOGY | Facility: CLINIC | Age: 82
End: 2022-02-08

## 2022-02-08 VITALS
HEIGHT: 66 IN | DIASTOLIC BLOOD PRESSURE: 67 MMHG | SYSTOLIC BLOOD PRESSURE: 130 MMHG | HEART RATE: 69 BPM | OXYGEN SATURATION: 96 % | BODY MASS INDEX: 23.88 KG/M2 | WEIGHT: 148.6 LBS

## 2022-02-08 DIAGNOSIS — I10 ESSENTIAL HYPERTENSION: ICD-10-CM

## 2022-02-08 DIAGNOSIS — Z95.0 PACEMAKER: ICD-10-CM

## 2022-02-08 DIAGNOSIS — I48.0 PAROXYSMAL ATRIAL FIBRILLATION: Primary | ICD-10-CM

## 2022-02-08 PROCEDURE — 99213 OFFICE O/P EST LOW 20 MIN: CPT | Performed by: PHYSICIAN ASSISTANT

## 2022-02-08 RX ORDER — GABAPENTIN 300 MG/1
300 CAPSULE ORAL 3 TIMES DAILY
COMMUNITY

## 2022-02-08 RX ORDER — DAPAGLIFLOZIN 10 MG/1
TABLET, FILM COATED ORAL
COMMUNITY

## 2022-02-08 NOTE — PROGRESS NOTES
Problem list     Subjective   Nanci Pritchard is a 81 y.o. female     Chief Complaint   Patient presents with   • Follow-up     6 month    Problem List:  1. Sick sinus syndrome.  1.1. Greater than 3 second pause noted per event monitor.  1.2. The patient was subsequently admitted to [facility] with placement of dual-chamber pacemaker per Dr. Gutierrez.  1.3. Apparent lead dislodgment with subsequent fixation of the leads the following day post pacemaker placement.  2. Preserved systolic function  3. Chest Discomfort  3.1 Low risk stress test, 09/2016.  3.2 Stress 7/2019 possible anterolateral wall ischemia  3.3 LHC 8/2019 stenting of LAD and RCA  3.4 Repeat [facility], 5/2020, with stenting of the LAD for subtotal IntraStent restenosis.  The patient had moderate but nonobstructive disease otherwise with medical management recommended.  4. Hypertension  5. Atrial Fibrillation  6. Fibromyalgia    HPI  The patient presents into the clinic today for routine follow-up.  For the most part, the patient has done well.  She reports chronic palpitations and chest discomfort, unchanged from baseline and what has been noted otherwise in the past.  This is remained unchanged since her last evaluation.  Her dyspnea is at baseline.  She has had no further episodes of A. fib of any significance by her report.  She denies failure symptoms.  Most recent pacer interrogation, of note, indicates normal function with adequate battery life.  She did have a stress test in August, 2021, which indicated no evidence of ischemia.  She has no further complaints at this time.    Current Outpatient Medications on File Prior to Visit   Medication Sig Dispense Refill   • amLODIPine (NORVASC) 5 MG tablet Take 1 tablet by mouth once daily 90 tablet 1   • apixaban (ELIQUIS) 5 MG tablet tablet Take 1 tablet by mouth 2 (two) times a day. 60 tablet 11   • Calcium Carbonate (CALTRATE 600) 1500 (600 Ca) MG tablet Take 600 mg by mouth Daily.      • clopidogrel (PLAVIX) 75 MG tablet Take 1 tablet by mouth once daily 90 tablet 1   • Dapagliflozin Propanediol (Farxiga) 10 MG tablet Take  by mouth.     • DULoxetine (CYMBALTA) 60 MG capsule Take 60 mg by mouth Daily.     • empagliflozin (Jardiance) 25 MG tablet tablet Take  by mouth Daily.     • Evolocumab with Infusor (Repatha Pushtronex System) solution cartridge Inject 3.5 mL under the skin into the appropriate area as directed Every 30 (Thirty) Days. 1 cartridge. 11   • gabapentin (NEURONTIN) 300 MG capsule Take 300 mg by mouth 3 (Three) Times a Day.     • lisinopril (PRINIVIL,ZESTRIL) 20 MG tablet Take 1 tablet by mouth twice daily 180 tablet 0   • metFORMIN (GLUCOPHAGE) 500 MG tablet Take 1,000 mg by mouth 2 (Two) Times a Day With Meals.     • metoprolol tartrate (LOPRESSOR) 50 MG tablet Take 1 tablet by mouth 2 (Two) Times a Day. 180 tablet 3   • Multiple Vitamins-Minerals (EYE VITAMINS) capsule Take  by mouth.     • nitroglycerin (NITROSTAT) 0.4 MG SL tablet 1 under the tongue as needed for angina, may repeat q5mins for up three doses 30 tablet 5   • Omega-3 Fatty Acids (OMEGA-3 FISH OIL PO) Take  by mouth.     • omeprazole (priLOSEC) 40 MG capsule Take 40 mg by mouth Daily.       No current facility-administered medications on file prior to visit.       Soma [carisoprodol]    Past Medical History:   Diagnosis Date   • Arrhythmia    • Atrial fibrillation ([facility])    • Encounter for monitoring Coumadin therapy    • Fibromyalgia    • Fracture of face bones due to fall, closed, initial encounter ([facility])    • Histoplasmosis    • Hyperlipidemia    • Hypertension    • Osteoarthritis    • SSS (sick sinus syndrome) ([facility])        Social History     Socioeconomic History   • Marital status:    Tobacco Use   • Smoking status: Never Smoker   • Smokeless tobacco: Never Used   Substance and Sexual Activity   • Alcohol use: No   • Drug use: No   • Sexual activity: Defer       Family History   Problem Relation Age of  "Onset   • Heart disease Mother    • Heart disease Father        Review of Systems   Constitutional: Positive for fatigue. Negative for chills and fever.   HENT: Negative.  Negative for congestion and rhinorrhea.    Eyes: Positive for visual disturbance (glasses).   Respiratory: Positive for shortness of breath. Negative for chest tightness and wheezing.    Cardiovascular: Negative.  Negative for chest pain, palpitations and leg swelling.   Gastrointestinal: Negative.    Endocrine: Negative.    Genitourinary: Negative.    Musculoskeletal: Positive for arthralgias. Negative for back pain and neck pain.   Skin: Negative.  Negative for rash and wound.   Allergic/Immunologic: Positive for environmental allergies.   Neurological: Positive for dizziness and numbness (hands ). Negative for weakness and headaches.   Hematological: Bruises/bleeds easily (bruises / bleeds).   Psychiatric/Behavioral: Negative.  Negative for sleep disturbance.       Objective   Vitals:    02/08/22 1457   BP: 130/67   BP Location: Left arm   Patient Position: Sitting   Pulse: 69   SpO2: 96%   Weight: 67.4 kg (148 lb 9.6 oz)   Height: 167.6 cm (66\")      /67 (BP Location: Left arm, Patient Position: Sitting)   Pulse 69   Ht 167.6 cm (66\")   Wt 67.4 kg (148 lb 9.6 oz)   SpO2 96%   BMI 23.98 kg/m²    Lab Results (most recent)     None        Physical Exam  Vitals and nursing note reviewed.   Constitutional:       General: She is not in acute distress.     Appearance: She is well-developed.   HENT:      Head: Normocephalic and atraumatic.   Eyes:      Conjunctiva/sclera: Conjunctivae normal.      Pupils: Pupils are equal, round, and reactive to light.   Neck:      Vascular: No JVD.      Trachea: No tracheal deviation.   Cardiovascular:      Rate and Rhythm: Normal rate and regular rhythm.      Heart sounds: Normal heart sounds.   Pulmonary:      Effort: Pulmonary effort is normal.      Breath sounds: Normal breath sounds.   Abdominal:      " General: Bowel sounds are normal. There is no distension.      Palpations: Abdomen is soft. There is no mass.      Tenderness: There is no abdominal tenderness. There is no guarding or rebound.   Musculoskeletal:         General: No tenderness or deformity. Normal range of motion.      Cervical back: Normal range of motion and neck supple.   Skin:     General: Skin is warm and dry.      Coloration: Skin is not pale.      Findings: No erythema or rash.   Neurological:      Mental Status: She is alert and oriented to person, place, and time.   Psychiatric:         Behavior: Behavior normal.         Thought Content: Thought content normal.         Judgment: Judgment normal.           Procedure   Procedures       Assessment/Plan      Diagnosis Plan   1. Paroxysmal atrial fibrillation (HCC)     2. Essential hypertension     3. Pacemaker       1.  The patient appears stable from cardiovascular standpoint.  A. fib is well treated on current medical regimen.  We will continue rate control and anticoagulation without change.    2.  Blood pressures remain well controlled as well.  She will monitor blood pressures closely at home and call to us for any complications.    3.  Routine pacer interrogations have indicated normal function with adequate battery life.  We will continue that every 6 months to the clinic.    4.  We will make no adjustments at this time.  She appears to be doing fairly well from cardiovascular standpoint.  We will continue to see her on 6-month intervals.                Advance Care Planning   ACP discussion was held with the patient during this visit. Patient has an advance directive (not in EMR), copy requested.        Electronically signed by:

## 2022-02-08 NOTE — PATIENT INSTRUCTIONS

## 2022-02-18 ENCOUNTER — OFFICE VISIT (OUTPATIENT)
Dept: CARDIOLOGY | Facility: CLINIC | Age: 82
End: 2022-02-18

## 2022-02-18 DIAGNOSIS — I42.9 CARDIOMYOPATHY, UNSPECIFIED TYPE: Primary | ICD-10-CM

## 2022-02-18 DIAGNOSIS — I48.91 ATRIAL FIBRILLATION, UNSPECIFIED TYPE: ICD-10-CM

## 2022-02-18 PROCEDURE — 93280 PM DEVICE PROGR EVAL DUAL: CPT | Performed by: INTERNAL MEDICINE

## 2022-03-07 ENCOUNTER — TELEPHONE (OUTPATIENT)
Dept: CARDIOLOGY | Facility: CLINIC | Age: 82
End: 2022-03-07

## 2022-03-07 RX ORDER — ASPIRIN 81 MG/1
81 TABLET ORAL DAILY
COMMUNITY

## 2022-03-07 NOTE — TELEPHONE ENCOUNTER
Per Edilson Souza PA - hold Eliquis for 2 days and continue aspirin.     Patient verbalized OK     AT [facility]       ----- Message from Zandra Minor sent at 3/7/2022 11:47 AM EST -----  PT HAS BEEN HAVING BLOOD IN HER URINE FOR ABOUT A MONTH. SOMETIMES ITS ONLY BLOOD. SOMETIMES IT BRIGHT AND SOMETIMES ITS CLOUDY. PER PCP DID A CT AB/PEL (I SCANNED INTO CHART) SHE HAS NOT HEARD RESULTS YET. HER SISTER ALSO HAD THE SAME PROBLEM AND HAD TESTING DONE AND COULDN'T ANYTHING FIGURED OUT. SHE WAS TAKEN OFF OF HER ELIQUIS AND IT STOPPED. PT WANTS TO KNOW IF THAT THE SAME THING WITH HER.

## 2022-03-10 NOTE — TELEPHONE ENCOUNTER
I advised pt how important it is to restart the Eliquis and if she dont its a higher risk for stroke, she states she understands and she is not bleeding at this time but if she starts bleeding again she isnt going to continue. I advised her to call us if bleeding continues.    Advised we could refer her to EP, she states she doesn't want to at this time due to all the other doctors she is seeing. I told her she can always call and we can place it for her, to hopefully wean her off the Eliquis as she desires.

## 2022-03-14 ENCOUNTER — TELEPHONE (OUTPATIENT)
Dept: CARDIOLOGY | Facility: CLINIC | Age: 82
End: 2022-03-14

## 2022-03-14 RX ORDER — CLOPIDOGREL BISULFATE 75 MG/1
75 TABLET ORAL DAILY
Qty: 30 TABLET | Refills: 11 | Status: SHIPPED | OUTPATIENT
Start: 2022-03-14

## 2022-03-14 NOTE — TELEPHONE ENCOUNTER
Per Edilson Souza PAC,  ASA 81 w/ Plavix 75  PT HAS BEEN MADE AWARE OF POSS INCREASE OF POSS STROKE, SHE VERBALLY UNDERSTANDS.

## 2022-03-14 NOTE — TELEPHONE ENCOUNTER
Pt called and states she started back her Eliquis 03/10/22 and woke up this am with a nose bleed, states not currently bleeding and the bleeding stopped was a one time occurrence, also start her urine has blood in it this am, now its just dark in color.    States does not want to continue it.

## 2022-03-30 ENCOUNTER — OFFICE VISIT (OUTPATIENT)
Dept: CARDIOLOGY | Facility: CLINIC | Age: 82
End: 2022-03-30

## 2022-03-30 VITALS
WEIGHT: 150 LBS | HEART RATE: 77 BPM | BODY MASS INDEX: 24.11 KG/M2 | HEIGHT: 66 IN | OXYGEN SATURATION: 95 % | DIASTOLIC BLOOD PRESSURE: 74 MMHG | SYSTOLIC BLOOD PRESSURE: 149 MMHG

## 2022-03-30 DIAGNOSIS — Z95.0 PACEMAKER: ICD-10-CM

## 2022-03-30 DIAGNOSIS — I48.0 PAROXYSMAL ATRIAL FIBRILLATION: ICD-10-CM

## 2022-03-30 DIAGNOSIS — I25.84 CORONARY ARTERY DISEASE DUE TO CALCIFIED CORONARY LESION: Primary | ICD-10-CM

## 2022-03-30 DIAGNOSIS — I25.10 CORONARY ARTERY DISEASE DUE TO CALCIFIED CORONARY LESION: Primary | ICD-10-CM

## 2022-03-30 PROCEDURE — 99213 OFFICE O/P EST LOW 20 MIN: CPT | Performed by: PHYSICIAN ASSISTANT

## 2022-03-30 NOTE — PROGRESS NOTES
Problem list     Subjective   Nanci Pritchard is a 81 y.o. female     Chief Complaint   Patient presents with   • Follow-up     1 month - medication ? Causing bleeding    Problem List:  1. Sick sinus syndrome.  1.1. Greater than 3 second pause noted per event monitor.  1.2. The patient was subsequently admitted to [facility] with placement of dual-chamber pacemaker per Dr. Gutierrez.  1.3. Apparent lead dislodgment with subsequent fixation of the leads the following day post pacemaker placement.  2. Preserved systolic function  3. Chest Discomfort  3.1 Low risk stress test, 09/2016.  3.2 Stress 7/2019 possible anterolateral wall ischemia  3.3 LHC 8/2019 stenting of LAD and RCA  3.4 Repeat [facility], 5/2020, with stenting of the LAD for subtotal IntraStent restenosis.  The patient had moderate but nonobstructive disease otherwise with medical management recommended.  4. Hypertension  5. Atrial Fibrillation  6. Fibromyalgia    HPI  The patient presents to clinic today to discuss medication changes.  She had recurrent hematuria and recurrent episodes of epistaxis.  She stopped Eliquis on her own and has now resumed aspirin and Plavix.  She feels better now than she has in months.  She has no more bleeding issues, specifically no more hematuria nor nosebleeds.  She acknowledges CVA risk but reports she will not resume anticoagulation therapy.  From general cardiovascular standpoint, the patient has no angina.  She has stable dyspnea.  Last interrogation indicated mostly stable function.  She has no failure symptoms.  She has no further complaints otherwise and feels that she is doing well.    Current Outpatient Medications on File Prior to Visit   Medication Sig Dispense Refill   • amLODIPine (NORVASC) 5 MG tablet Take 1 tablet by mouth once daily 90 tablet 1   • aspirin 81 MG EC tablet Take 81 mg by mouth Daily.     • Calcium Carbonate 1500 (600 Ca) MG tablet Take 600 mg by mouth Daily.     •  clopidogrel (PLAVIX) 75 MG tablet Take 1 tablet by mouth Daily. 30 tablet 11   • Dapagliflozin Propanediol (Farxiga) 10 MG tablet Take  by mouth.     • DULoxetine (CYMBALTA) 60 MG capsule Take 60 mg by mouth Daily.     • empagliflozin (JARDIANCE) 25 MG tablet tablet Take  by mouth Daily.     • Evolocumab with Infusor (Repatha Pushtronex System) solution cartridge Inject 3.5 mL under the skin into the appropriate area as directed Every 30 (Thirty) Days. 1 cartridge. 11   • gabapentin (NEURONTIN) 300 MG capsule Take 300 mg by mouth 3 (Three) Times a Day.     • lisinopril (PRINIVIL,ZESTRIL) 20 MG tablet Take 1 tablet by mouth twice daily 180 tablet 0   • metFORMIN (GLUCOPHAGE) 500 MG tablet Take 1,000 mg by mouth 2 (Two) Times a Day With Meals.     • metoprolol tartrate (LOPRESSOR) 50 MG tablet Take 1 tablet by mouth 2 (Two) Times a Day. 180 tablet 3   • Multiple Vitamins-Minerals (EYE VITAMINS) capsule Take  by mouth.     • nitroglycerin (NITROSTAT) 0.4 MG SL tablet 1 under the tongue as needed for angina, may repeat q5mins for up three doses 30 tablet 5   • Omega-3 Fatty Acids (OMEGA-3 FISH OIL PO) Take  by mouth.     • omeprazole (priLOSEC) 40 MG capsule Take 40 mg by mouth Daily.     • [DISCONTINUED] apixaban (ELIQUIS) 5 MG tablet tablet Take 1 tablet by mouth 2 (two) times a day. 60 tablet 11     No current facility-administered medications on file prior to visit.       Soma [carisoprodol]    Past Medical History:   Diagnosis Date   • Arrhythmia    • Atrial fibrillation ([facility])    • Encounter for monitoring Coumadin therapy    • Fibromyalgia    • Fracture of face bones due to fall, closed, initial encounter ([facility])    • Histoplasmosis    • Hyperlipidemia    • Hypertension    • Osteoarthritis    • SSS (sick sinus syndrome) ([facility])        Social History     Socioeconomic History   • Marital status:    Tobacco Use   • Smoking status: Never Smoker   • Smokeless tobacco: Never Used   Substance and Sexual Activity   •  "Alcohol use: No   • Drug use: No   • Sexual activity: Defer       Family History   Problem Relation Age of Onset   • Heart disease Mother    • Heart disease Father        Review of Systems   Constitutional: Negative.  Negative for chills, fatigue and fever.   HENT: Negative.  Negative for congestion, rhinorrhea and sore throat.    Eyes: Positive for visual disturbance (glasses).   Respiratory: Positive for shortness of breath. Negative for chest tightness and wheezing.    Cardiovascular: Negative.  Negative for chest pain, palpitations and leg swelling.   Gastrointestinal: Negative.    Endocrine: Negative.    Genitourinary: Negative.    Musculoskeletal: Positive for arthralgias. Negative for back pain and neck pain.   Skin: Negative.  Negative for rash and wound.   Allergic/Immunologic: Negative.  Negative for environmental allergies.   Neurological: Positive for dizziness and numbness (arms / hands). Negative for weakness and headaches.   Hematological: Negative.  Does not bruise/bleed easily.   Psychiatric/Behavioral: Negative.  Negative for sleep disturbance.       Objective   Vitals:    03/30/22 1407   BP: 149/74   BP Location: Left arm   Patient Position: Sitting   Pulse: 77   SpO2: 95%   Weight: 68 kg (150 lb)   Height: 167.6 cm (66\")      /74 (BP Location: Left arm, Patient Position: Sitting)   Pulse 77   Ht 167.6 cm (66\")   Wt 68 kg (150 lb)   SpO2 95%   BMI 24.21 kg/m²    Lab Results (most recent)     None        Physical Exam  Vitals and nursing note reviewed.   Constitutional:       General: She is not in acute distress.     Appearance: She is well-developed.   HENT:      Head: Normocephalic and atraumatic.   Eyes:      Conjunctiva/sclera: Conjunctivae normal.      Pupils: Pupils are equal, round, and reactive to light.   Neck:      Vascular: No JVD.      Trachea: No tracheal deviation.   Cardiovascular:      Rate and Rhythm: Normal rate and regular rhythm.      Heart sounds: Normal heart " sounds.   Pulmonary:      Effort: Pulmonary effort is normal.      Breath sounds: Normal breath sounds.   Abdominal:      General: Bowel sounds are normal. There is no distension.      Palpations: Abdomen is soft. There is no mass.      Tenderness: There is no abdominal tenderness. There is no guarding or rebound.   Musculoskeletal:         General: No tenderness or deformity. Normal range of motion.      Cervical back: Normal range of motion and neck supple.   Skin:     General: Skin is warm and dry.      Coloration: Skin is not pale.      Findings: No erythema or rash.   Neurological:      Mental Status: She is alert and oriented to person, place, and time.   Psychiatric:         Behavior: Behavior normal.         Thought Content: Thought content normal.         Judgment: Judgment normal.           Procedure   Procedures       Assessment/Plan      Diagnosis Plan   1. Coronary artery disease due to calcified coronary lesion     2. Paroxysmal atrial fibrillation (HCC)     3. Pacemaker     1.  The patient is doing very well at this time by her report.  As a matter fact, she feels better than she has in months or years at this time.  She contributes this to being off anticoagulation.  She has had no more bleeding issues, all as above.  She has resumed aspirin and Plavix and feels extraordinarily well on that regimen.  I have discussed CVA risk with ABDIRASHID montero in the absence of anticoagulation.  She acknowledges risk but wants no consideration for rechallenging anticoagulation.  She will call if she changes her mind for the same.    2.  Clinically, the patient is doing well otherwise.  She has no angina, failure, or dysrhythmic issues.  For change in clinical course, she will call immediately.    3.  Most recent pacer interrogation indicates normal function.  We will continue that every 6 months through the clinic.    4.  I would make no further adjustments in medications.  We will continue to see the patient on routine  6-month intervals.            Patient did not bring med list or medicine bottles to appointment, med list has been reviewed and updated based on patient's knowledge of their meds.           Advance Care Planning   ACP discussion was held with the patient during this visit. Patient has an advance directive (not in EMR), copy requested.             Electronically signed by:

## 2022-04-11 ENCOUNTER — TELEPHONE (OUTPATIENT)
Dept: CARDIOLOGY | Facility: CLINIC | Age: 82
End: 2022-04-11

## 2022-04-11 NOTE — TELEPHONE ENCOUNTER
Brittany Garrido APRN office called and I spoke w / Shannan- Pt's PCP called and states since pt is not taking Eliquis if NILESH Harris thinks she would be a good candidate for watchman's Procedure.    Advised to fax over a cardiac clearance and it would be reviewed by Edilson SouzaPAC

## 2022-04-23 DIAGNOSIS — I10 ESSENTIAL HYPERTENSION: ICD-10-CM

## 2022-04-25 RX ORDER — LISINOPRIL 20 MG/1
TABLET ORAL
Qty: 180 TABLET | Refills: 3 | Status: SHIPPED | OUTPATIENT
Start: 2022-04-25

## 2022-04-28 RX ORDER — EVOLOCUMAB 420 MG/3.5
KIT SUBCUTANEOUS
Qty: 4 ML | Refills: 6 | Status: SHIPPED | OUTPATIENT
Start: 2022-04-28 | End: 2022-04-29 | Stop reason: SDUPTHER

## 2022-04-29 RX ORDER — EVOLOCUMAB 420 MG/3.5
420 KIT SUBCUTANEOUS
Qty: 1 CARTRIDGE | Refills: 11 | Status: SHIPPED | OUTPATIENT
Start: 2022-04-29 | End: 2022-11-02 | Stop reason: SDUPTHER

## 2022-06-18 DIAGNOSIS — I10 ESSENTIAL HYPERTENSION: ICD-10-CM

## 2022-06-20 RX ORDER — AMLODIPINE BESYLATE 5 MG/1
TABLET ORAL
Qty: 90 TABLET | Refills: 0 | Status: SHIPPED | OUTPATIENT
Start: 2022-06-20 | End: 2022-09-20

## 2022-07-12 ENCOUNTER — TELEPHONE (OUTPATIENT)
Dept: CARDIOLOGY | Facility: CLINIC | Age: 82
End: 2022-07-12

## 2022-07-12 NOTE — TELEPHONE ENCOUNTER
Patient called and states she is concerned, having Carpal Tunnel Surgery and being on blood thinners.I advised would need to contact office, that is doing the procedure and have them send us a clearance. Edilson Souza,NILESH will review and send back recommendations.    Patient verbally understands.    EBONI BRAMBILA MA

## 2022-08-19 ENCOUNTER — OFFICE VISIT (OUTPATIENT)
Dept: CARDIOLOGY | Facility: CLINIC | Age: 82
End: 2022-08-19

## 2022-08-19 VITALS
SYSTOLIC BLOOD PRESSURE: 146 MMHG | WEIGHT: 142.6 LBS | HEART RATE: 70 BPM | OXYGEN SATURATION: 95 % | HEIGHT: 66 IN | BODY MASS INDEX: 22.92 KG/M2 | DIASTOLIC BLOOD PRESSURE: 76 MMHG

## 2022-08-19 DIAGNOSIS — R94.39 ABNORMAL CAROTID DUPLEX SCAN: ICD-10-CM

## 2022-08-19 DIAGNOSIS — R00.2 PALPITATIONS: ICD-10-CM

## 2022-08-19 DIAGNOSIS — I49.5 SICK SINUS SYNDROME: ICD-10-CM

## 2022-08-19 DIAGNOSIS — I48.0 PAROXYSMAL ATRIAL FIBRILLATION: ICD-10-CM

## 2022-08-19 DIAGNOSIS — R06.02 SHORTNESS OF BREATH: ICD-10-CM

## 2022-08-19 DIAGNOSIS — I25.10 CORONARY ARTERY DISEASE INVOLVING NATIVE CORONARY ARTERY OF NATIVE HEART WITHOUT ANGINA PECTORIS: Primary | ICD-10-CM

## 2022-08-19 DIAGNOSIS — Z95.0 PACEMAKER: ICD-10-CM

## 2022-08-19 DIAGNOSIS — I42.9 CARDIOMYOPATHY, UNSPECIFIED TYPE: ICD-10-CM

## 2022-08-19 DIAGNOSIS — I10 ESSENTIAL HYPERTENSION: ICD-10-CM

## 2022-08-19 DIAGNOSIS — I48.0 PAROXYSMAL ATRIAL FIBRILLATION: Primary | ICD-10-CM

## 2022-08-19 PROCEDURE — 99213 OFFICE O/P EST LOW 20 MIN: CPT | Performed by: PHYSICIAN ASSISTANT

## 2022-08-19 PROCEDURE — 93280 PM DEVICE PROGR EVAL DUAL: CPT | Performed by: INTERNAL MEDICINE

## 2022-08-19 NOTE — PROGRESS NOTES
Problem list     Subjective   Nanci Pritchard is a 82 y.o. female     Chief Complaint   Patient presents with   • Follow-up     6 month - pacer check    • Coronary Artery Disease   • Atrial Fibrillation   Problem List:  1. Sick sinus syndrome.  1.1. Greater than 3 second pause noted per event monitor.  1.2. The patient was subsequently admitted to [facility] with placement of dual-chamber pacemaker per Dr. Gutierrez.  1.3. Apparent lead dislodgment with subsequent fixation of the leads the following day post pacemaker placement.  2. Preserved systolic function  3. Chest Discomfort  3.1 Low risk stress test, 09/2016.  3.2 Stress 7/2019 possible anterolateral wall ischemia  3.3 LHC 8/2019 stenting of LAD and RCA  3.4 Repeat [facility], 5/2020, with stenting of the LAD for subtotal IntraStent restenosis.  The patient had moderate but nonobstructive disease otherwise with medical management recommended.  4. Hypertension  5. Atrial Fibrillation  6. Fibromyalgia    HPI  The patient presents to clinic today for routine evaluation.  She also presents for routine pacer interrogation.  Since last evaluation, the patient really has done well.  She reports no current chest pain.  She has stable dyspnea.  She has no failure or significant dysrhythmic symptoms.  Blood pressures appear to be reasonably well controlled.  She is tolerating current medical regimen without major complication.  Pacer interrogation today indicates normal function with adequate battery life.  She has no further complaints otherwise and feels that she is doing well.    Current Outpatient Medications on File Prior to Visit   Medication Sig Dispense Refill   • amLODIPine (NORVASC) 5 MG tablet Take 1 tablet by mouth once daily 90 tablet 0   • aspirin 81 MG EC tablet Take 81 mg by mouth Daily.     • Calcium Carbonate 1500 (600 Ca) MG tablet Take 600 mg by mouth Daily.     • clopidogrel (PLAVIX) 75 MG tablet Take 1 tablet by mouth Daily. 30  tablet 11   • Dapagliflozin Propanediol (Farxiga) 10 MG tablet Take  by mouth.     • DULoxetine (CYMBALTA) 60 MG capsule Take 60 mg by mouth Daily.     • empagliflozin (JARDIANCE) 25 MG tablet tablet Take  by mouth Daily.     • Evolocumab with Infusor (Repatha Pushtronex System) solution cartridge Inject 3.5 mL under the skin into the appropriate area as directed Every 30 (Thirty) Days. 1 cartridge. 11   • gabapentin (NEURONTIN) 300 MG capsule Take 300 mg by mouth 3 (Three) Times a Day.     • lisinopril (PRINIVIL,ZESTRIL) 20 MG tablet Take 1 tablet by mouth twice daily 180 tablet 3   • metFORMIN (GLUCOPHAGE) 500 MG tablet Take 1,000 mg by mouth 2 (Two) Times a Day With Meals.     • metoprolol tartrate (LOPRESSOR) 50 MG tablet Take 1 tablet by mouth 2 (Two) Times a Day. 180 tablet 3   • Multiple Vitamins-Minerals (EYE VITAMINS) capsule Take  by mouth.     • nitroglycerin (NITROSTAT) 0.4 MG SL tablet 1 under the tongue as needed for angina, may repeat q5mins for up three doses 30 tablet 5   • Omega-3 Fatty Acids (OMEGA-3 FISH OIL PO) Take  by mouth.     • omeprazole (priLOSEC) 40 MG capsule Take 40 mg by mouth Daily.       No current facility-administered medications on file prior to visit.       Soma [carisoprodol]    Past Medical History:   Diagnosis Date   • Arrhythmia    • Atrial fibrillation ([facility])    • Encounter for monitoring Coumadin therapy    • Fibromyalgia    • Fracture of face bones due to fall, closed, initial encounter ([facility])    • Histoplasmosis    • Hyperlipidemia    • Hypertension    • Osteoarthritis    • SSS (sick sinus syndrome) (Formerly [facility])        Social History     Socioeconomic History   • Marital status:    Tobacco Use   • Smoking status: Never Smoker   • Smokeless tobacco: Never Used   Substance and Sexual Activity   • Alcohol use: No   • Drug use: No   • Sexual activity: Defer       Family History   Problem Relation Age of Onset   • Heart disease Mother    • Heart disease Father        Review of  "Systems   Constitutional: Negative.  Negative for chills, fatigue and fever.   HENT: Negative.  Negative for congestion, rhinorrhea and sore throat.    Eyes: Positive for visual disturbance (glasses).   Respiratory: Negative.  Negative for chest tightness, shortness of breath and wheezing.    Cardiovascular: Positive for leg swelling. Negative for chest pain and palpitations.   Gastrointestinal: Negative.    Endocrine: Negative.    Genitourinary: Negative.    Musculoskeletal: Negative.  Negative for arthralgias, back pain and neck pain.   Skin: Negative.  Negative for rash and wound.   Allergic/Immunologic: Negative.  Negative for environmental allergies.   Neurological: Positive for dizziness. Negative for weakness, numbness and headaches.   Hematological: Bruises/bleeds easily.   Psychiatric/Behavioral: Negative.  Negative for sleep disturbance.       Objective   Vitals:    08/19/22 1103   BP: 146/76   BP Location: Left arm   Patient Position: Sitting   Pulse: 70   SpO2: 95%   Weight: 64.7 kg (142 lb 9.6 oz)   Height: 167.6 cm (66\")      /76 (BP Location: Left arm, Patient Position: Sitting)   Pulse 70   Ht 167.6 cm (66\")   Wt 64.7 kg (142 lb 9.6 oz)   SpO2 95%   BMI 23.02 kg/m²    Lab Results (most recent)     None        Physical Exam  Vitals and nursing note reviewed.   Constitutional:       General: She is not in acute distress.     Appearance: She is well-developed.   HENT:      Head: Normocephalic and atraumatic.   Eyes:      Conjunctiva/sclera: Conjunctivae normal.      Pupils: Pupils are equal, round, and reactive to light.   Neck:      Vascular: No JVD.      Trachea: No tracheal deviation.   Cardiovascular:      Rate and Rhythm: Normal rate and regular rhythm.      Heart sounds: Normal heart sounds.   Pulmonary:      Effort: Pulmonary effort is normal.      Breath sounds: Normal breath sounds.   Abdominal:      General: Bowel sounds are normal. There is no distension.      Palpations: Abdomen " is soft. There is no mass.      Tenderness: There is no abdominal tenderness. There is no guarding or rebound.   Musculoskeletal:         General: No tenderness or deformity. Normal range of motion.      Cervical back: Normal range of motion and neck supple.   Skin:     General: Skin is warm and dry.      Coloration: Skin is not pale.      Findings: No erythema or rash.   Neurological:      Mental Status: She is alert and oriented to person, place, and time.   Psychiatric:         Behavior: Behavior normal.         Thought Content: Thought content normal.         Judgment: Judgment normal.           Procedure   Procedures       Assessment & Plan      Diagnosis Plan   1. Coronary artery disease involving native coronary artery of native heart without angina pectoris     2. Shortness of breath     3. Pacemaker     4. Paroxysmal atrial fibrillation (HCC)       1.  At this time, the patient appears to be doing well.  Dyspnea is at baseline.  She has no symptoms of angina, failure, or dysrhythmias.  From general cardiovascular standpoint, the patient feels that she is doing well.    2.  Pacer interrogation today indicates stable function with adequate battery life.  We will continue routine interrogations every 6 months to the clinic.    3.  I would continue medical regimen without change.  We will continue to see her on 6-month intervals, sooner for complications.            Advance Care Planning   ACP discussion was held with the patient during this visit. Patient has an advance directive (not in EMR), copy requested.             Electronically signed by:

## 2022-09-17 DIAGNOSIS — I10 ESSENTIAL HYPERTENSION: ICD-10-CM

## 2022-09-20 RX ORDER — AMLODIPINE BESYLATE 5 MG/1
TABLET ORAL
Qty: 90 TABLET | Refills: 0 | Status: SHIPPED | OUTPATIENT
Start: 2022-09-20 | End: 2022-09-22

## 2022-09-22 ENCOUNTER — TELEPHONE (OUTPATIENT)
Dept: CARDIOLOGY | Facility: CLINIC | Age: 82
End: 2022-09-22

## 2022-09-22 DIAGNOSIS — I10 ESSENTIAL HYPERTENSION: ICD-10-CM

## 2022-09-22 RX ORDER — AMLODIPINE BESYLATE 5 MG/1
TABLET ORAL
Qty: 90 TABLET | Refills: 0 | Status: SHIPPED | OUTPATIENT
Start: 2022-09-22 | End: 2023-03-23

## 2022-09-22 NOTE — TELEPHONE ENCOUNTER
Patient called stating she is having upcoming foot surgery, and was told to contact our office regarding medications.    I advised patient to contact facility doing surgery, they will need to send us a clearance form for recommendations. Patient verbally understands.    EBONI BRAMBILA MA

## 2022-10-06 ENCOUNTER — TELEPHONE (OUTPATIENT)
Dept: CARDIOLOGY | Facility: CLINIC | Age: 82
End: 2022-10-06

## 2022-10-06 NOTE — TELEPHONE ENCOUNTER
Received cardiac clearance request from  stating pt has omgical alveoplasty of her upper ridge to be scheduled and is requiring a cardiac clearance. Placed cardiac clearance request in Estefania's inbox to review and address with provider.

## 2022-10-10 NOTE — TELEPHONE ENCOUNTER
Per Edilson: acceptable risk, may hold Plavix 5-7 days, continue ASA.         Faxed to [phone]

## 2022-10-19 ENCOUNTER — TELEPHONE (OUTPATIENT)
Dept: CARDIOLOGY | Facility: CLINIC | Age: 82
End: 2022-10-19

## 2022-10-19 NOTE — TELEPHONE ENCOUNTER
"Per chart review, we received a cardiac clearance on pt regd a surgery w/ Dr. White and the clearance was sent back to them 10/10/22. That clearance stated she can hold her Plavix for 5-7 days and to continue taking Aspirin 81mg daily.   We will need a separate cardiac clearance for any other additional surgeries.         I called pt, and informed pt of the above. She stated that the other surgeon's office refuses to send a clearance request. She stated that they're going to be using an anesthesia and Edilson told her that isn't safe.   I asked pt for the name of the surgeon, as we have to have something on their letterhead. Pt stated that office told her \"we don't do that, we don't have the time.\" She stated that she would \"just forget it\" because they won't help her. I told her that I would call that office, if she could give me the info. She stated that she doesn't want to do it right now with how she was treated and will just do therapy for now. Pt told me the surgeon she can't get clearance from is:  Dr. Pritchard, does not have his number, stated he's a foot specialist at [facility].     I told pt that I would try to reach out to that office and see if I can get a clearance request from them, then will call her back. She verbalized understanding.         Called [facility] at [phone], spoke w/ Ann and she stated that she would check w/ Dr. Vaca real quick regd pt, she placed me on hold.   Dr. Vaca got on the call, she stated that pt has some heel spurs and that no surgery was mentioned.       I called pt and informed her that Dr. May doesn't have her listed for surgery at this time, but is aware that we need a request if they want to do one in the future. Pt stated that she had issues w/ Dr. Pritchadr and his staff in that same office. I explained that Dr. Vaca reviewed the notes and did not see anything set up; however, we will assist her if and when clearance is " needed and to continue therapy, at this time. Pt verbalized understanding.

## 2022-10-19 NOTE — TELEPHONE ENCOUNTER
Caller: Nanci Pritchard    Relationship to patient: Self    Best call back number: [phone]    Patient is needing: PT IS CALLING BECAUSE SHE IS WANTING TO DOUBLE CHECK WITH RAYA ASTORGA ABOUT BEING OFF OF HER BLOOD THINNERS. PT IS SCHEDULED FOR SURGERY ON HER GUMS NEXT Tuesday. SHE IS NEEDING TO BE OFF THINNERS FOR 5 DAYS AND STATES THAT RAYA HENDRICKSNO SAID THAT SHE COULD ONLY BE OFF OF THEM FOR 2 DAYS. SHE IS ALSO NEEDING TO BE OFF THINNERS FOR ANOTHER 5 DAYS SEPARATE FROM THE 5 DAYS FOR GUM SURGERY, SO THAT SHE CAN HAVE SURGERY ON HER HANDS AND FEET. SHE IS HAVING TO DO THERAPY FOR 2-3 DAYS OUT OF THE WEEK, BECAUSE THEY ARE CAUSING HER SO MUCH PAIN. PT STATES THAT THEY TOLD HER THAT IF SHE CAN'T GET THE SURGERY SHE WOULD HAVE TO DO THERAPY FOR A VERY LONG TIME. SHE ALSO SAYS THAT SHE DOES NOT WANT TO TAKE A [place] ON HER LIFE AND JUST WANTS TO BE SURE THAT IT IS OKAY TO DO SO WITH DR. MARQUES. PT WILL BE AWAY FROM HOME AT 1:40 THIS AFTERNOON. IF UNABLE TO REACH LEAVE A VM WITH CALL BACK INSTRUCTIONS.

## 2022-11-02 ENCOUNTER — TELEPHONE (OUTPATIENT)
Dept: CARDIOLOGY | Facility: CLINIC | Age: 82
End: 2022-11-02

## 2022-11-02 RX ORDER — EVOLOCUMAB 420 MG/3.5
420 KIT SUBCUTANEOUS
Qty: 3.5 ML | Refills: 11 | Status: SHIPPED | OUTPATIENT
Start: 2022-11-02 | End: 2022-11-10 | Stop reason: ALTCHOICE

## 2022-11-09 ENCOUNTER — PRIOR AUTHORIZATION (OUTPATIENT)
Dept: CARDIOLOGY | Facility: CLINIC | Age: 82
End: 2022-11-09

## 2022-11-09 NOTE — TELEPHONE ENCOUNTER
Prior authorization request received for Repatha. Authorization submitted through Cover My Meds. Status pending.      Repatha denied due to not having tried Praluent. Verbal order per Edilson Souza PA-C for Praluent 75. Patient aware. 11/10/22    Called [facility], Praluent available without authorization with $0 copay.

## 2022-11-30 ENCOUNTER — TELEPHONE (OUTPATIENT)
Dept: CARDIOLOGY | Facility: CLINIC | Age: 82
End: 2022-11-30

## 2022-11-30 NOTE — TELEPHONE ENCOUNTER
Received cardiac clearance request from  stating pt has carpal tunnel release to be scheduled and is requiring a cardiac clearance. Placed cardiac clearance request in Chari's inbox to review and address with provider.

## 2022-12-02 ENCOUNTER — TELEPHONE (OUTPATIENT)
Dept: CARDIOLOGY | Facility: CLINIC | Age: 82
End: 2022-12-02

## 2022-12-02 NOTE — TELEPHONE ENCOUNTER
Cardiac clearance request received from Dr. Grover for Carpal Tunnel Syndrome of Right wrist TBS.  Scanned and placed in Chari's box.

## 2022-12-05 ENCOUNTER — TELEPHONE (OUTPATIENT)
Dept: CARDIOLOGY | Facility: CLINIC | Age: 82
End: 2022-12-05

## 2022-12-05 DIAGNOSIS — I10 ESSENTIAL HYPERTENSION: ICD-10-CM

## 2022-12-05 DIAGNOSIS — R94.39 ABNORMAL CAROTID DUPLEX SCAN: Primary | ICD-10-CM

## 2022-12-05 NOTE — TELEPHONE ENCOUNTER
Caller: Nanci Pritchard    Relationship to patient: Self    Best call back number: [phone]    Patient is needing: PT REPORTING HER ALIROCUMAB INJECTIONS  ARE GIVING HER RASH. PT ALSO REPORTS THAT IT CAUSES SWELLING AND NAUSEA . SHE IS NOT SCHEDULED TO HAVE ANOTHER UNTIL NEXT WEEK BUT WOULD LIKE TO SWITCH TO ANOTHER MEDICAITON.

## 2022-12-05 NOTE — TELEPHONE ENCOUNTER
----- Message from RAYA Louie sent at 12/2/2022  2:35 PM EST -----  CTA of the carotids with typical contrast precautions.  As the patient's primary care provider ordering this?  ----- Message -----  From: Sarai, Scans Incoming  Sent: 12/2/2022  11:19 AM EST  To: RAYA Louie

## 2022-12-05 NOTE — TELEPHONE ENCOUNTER
Patient would like to resume with CTA.\    CTA Carotids order. Patient verbally understands.  Aware to hold Metformin 24 hours prior, Lana will be calling with more detailed instructions also.

## 2022-12-09 ENCOUNTER — PRIOR AUTHORIZATION (OUTPATIENT)
Dept: CARDIOLOGY | Facility: CLINIC | Age: 82
End: 2022-12-09

## 2022-12-09 NOTE — TELEPHONE ENCOUNTER
Prior authorization request received for Repatha. Authorization submitted through Cover My Meds. Status pending.        Approved. This drug has been approved under the Member's Medicare Part D benefit. Approved quantity: 2 units per 30 day(s). You may fill up to a 90 day supply except for those on Specialty Tier 5, which can be filled up to a 30 day supply. Please call the pharmacy to process the prescription claim.

## 2022-12-28 DIAGNOSIS — I10 ESSENTIAL HYPERTENSION: ICD-10-CM

## 2022-12-28 DIAGNOSIS — I48.0 PAROXYSMAL ATRIAL FIBRILLATION: ICD-10-CM

## 2022-12-28 DIAGNOSIS — R00.2 PALPITATIONS: ICD-10-CM

## 2022-12-28 RX ORDER — METOPROLOL TARTRATE 50 MG/1
TABLET, FILM COATED ORAL
Qty: 180 TABLET | Refills: 0 | Status: SHIPPED | OUTPATIENT
Start: 2022-12-28 | End: 2023-03-29

## 2023-01-04 ENCOUNTER — PRIOR AUTHORIZATION (OUTPATIENT)
Dept: CARDIOLOGY | Facility: CLINIC | Age: 83
End: 2023-01-04
Payer: MEDICARE

## 2023-01-04 NOTE — TELEPHONE ENCOUNTER
Prior authorization request received for Repatha. Authorization submitted through Cover My Meds. Status pending.      Message received from Cover My Meds...  Educate Completed. Your PA request could not be processed electronically. Please contact plan for additional information    Called [facility]. Repatha approved until further notice.

## 2023-01-06 ENCOUNTER — TELEPHONE (OUTPATIENT)
Dept: CARDIOLOGY | Facility: CLINIC | Age: 83
End: 2023-01-06
Payer: MEDICARE

## 2023-01-06 DIAGNOSIS — R93.89 ABNORMAL COMPUTED TOMOGRAPHY ANGIOGRAPHY (CTA) OF NECK: Primary | ICD-10-CM

## 2023-01-06 NOTE — TELEPHONE ENCOUNTER
Went over narrowing of carotids, would need to see specialist for further evaluation and treatment. Notified patient of impression, she verbally understands.    Referral placed, Dr.El jacobson

## 2023-01-06 NOTE — TELEPHONE ENCOUNTER
----- Message from RAYA Louie sent at 1/5/2023  5:50 PM EST -----  Refer to neurosurgery locally.  ----- Message -----  From: Sherice Phelps RegSched Rep  Sent: 1/5/2023   9:36 AM EST  To: RAYA Louie

## 2023-01-09 ENCOUNTER — TELEPHONE (OUTPATIENT)
Dept: CARDIOLOGY | Facility: CLINIC | Age: 83
End: 2023-01-09
Payer: MEDICARE

## 2023-01-09 NOTE — TELEPHONE ENCOUNTER
Message received to call Carolyn at [phone]. Called patient as Carolyn not oh hipaa. Verbal permission given by patient to speak to Carolyn. She states she was informed by our office on Friday to sit by the phone all weekend as Dr Prieto's office would be calling them. Chart shows referral sent to Dr Prieto on Friday. Apologized for the confusion. Patient is having trouble speaking and unable to walk. She does not have any facial numbness, drawing or slurred speech. Carolyn is concerned she may be having a stroke due to carotid results. She was advised to proceed to the ER especially with any stroke like symptoms.

## 2023-01-11 ENCOUNTER — TELEPHONE (OUTPATIENT)
Dept: CARDIOLOGY | Facility: CLINIC | Age: 83
End: 2023-01-11
Payer: MEDICARE

## 2023-01-11 NOTE — LETTER
January 26, 2023             To Whom It May Concern:    We build our practice on integrity and patient care. The highest compliment we can receive is the referral of friends, family and patients to our office. We want to take this time to thank you for considering our group as part of your care team.    The medical records for Nanci Pritchard 1940 were reviewed for pre-operative clearance on 01/26/23. It has been determined that this patient has: ACCEPTABLE RISK    Nanci rPitchard is currently on the following medications that will need to be held prior to surgery: CONTINUE ASA, MAY HOLD PLAVIX 5-7 DAYS.    This pre-operative evaluation has been completed based on patient reported medical conditions at the date of this letter, this evaluation may have considered in part results of additional testing, completion of an EKG and patient reported symptoms at the time of their visit.    Nanci Pritchard's pre-operative clearance is good for thirty(30) days from the date of this letter with no reported changes in health, symptoms or diagnosis from the patient, their primary care provider or your office.    Your office has reported the patient will under go FOR LEFT CEA on 02/02/23 with Dr. YODER. If this appointment is changed or moved outside of thirty(30) days from 01/26/23 this pre-operative clearance is void.    If you have any further questions please give our office a call.    Thank you for your trust,      RAYA Harris

## 2023-01-11 NOTE — TELEPHONE ENCOUNTER
Received cardiac clearance request from  stating pt has left CEA scheduled for 02/02/2023 and is requiring a cardiac clearance. Placed cardiac clearance request in Chari's inbox to review and address with provider.

## 2023-03-03 ENCOUNTER — OFFICE VISIT (OUTPATIENT)
Dept: CARDIOLOGY | Facility: CLINIC | Age: 83
End: 2023-03-03
Payer: MEDICARE

## 2023-03-03 DIAGNOSIS — R07.2 PRECORDIAL PAIN: ICD-10-CM

## 2023-03-03 DIAGNOSIS — R00.2 PALPITATIONS: ICD-10-CM

## 2023-03-03 PROCEDURE — 93280 PM DEVICE PROGR EVAL DUAL: CPT | Performed by: INTERNAL MEDICINE

## 2023-03-23 DIAGNOSIS — I10 ESSENTIAL HYPERTENSION: ICD-10-CM

## 2023-03-23 RX ORDER — AMLODIPINE BESYLATE 5 MG/1
TABLET ORAL
Qty: 90 TABLET | Refills: 0 | Status: SHIPPED | OUTPATIENT
Start: 2023-03-23

## 2023-03-29 DIAGNOSIS — I10 ESSENTIAL HYPERTENSION: ICD-10-CM

## 2023-03-29 DIAGNOSIS — R00.2 PALPITATIONS: ICD-10-CM

## 2023-03-29 DIAGNOSIS — I48.0 PAROXYSMAL ATRIAL FIBRILLATION: ICD-10-CM

## 2023-03-29 RX ORDER — METOPROLOL TARTRATE 50 MG/1
TABLET, FILM COATED ORAL
Qty: 180 TABLET | Refills: 0 | Status: SHIPPED | OUTPATIENT
Start: 2023-03-29

## 2023-04-19 ENCOUNTER — TELEPHONE (OUTPATIENT)
Dept: CARDIOLOGY | Facility: CLINIC | Age: 83
End: 2023-04-19
Payer: MEDICARE

## 2023-04-19 NOTE — TELEPHONE ENCOUNTER
Caller: PANFILO OAKES     Relationship: SELF    Best call back number: [phone]    What is the best time to reach you: ANY    What was the call regarding: PATIENT PHONED TO ASK ABOUT HER ABILITY TO MOW. IS IT OKAY FOR HER TO MOW? SHE WANTS TO PURCHASE A MOWER BUT UNSURE IF IT WOULD HARM HER HEALTH. IF SHE DOES NOT ANSWER PLEASE LM.    Do you require a callback: YES        DELETE AFTER READING TO PATIENT: “ Thank you for sharing this information with me. I will send a message to the . Please allow up to 48 hours for the  to follow up on this request.”

## 2023-04-20 DIAGNOSIS — I10 ESSENTIAL HYPERTENSION: ICD-10-CM

## 2023-04-20 RX ORDER — LISINOPRIL 20 MG/1
TABLET ORAL
Qty: 180 TABLET | Refills: 0 | Status: SHIPPED | OUTPATIENT
Start: 2023-04-20

## 2023-04-20 NOTE — TELEPHONE ENCOUNTER
Called and advised pt that she can mow, but needs to monitor sx and stop for any issues, even if she's not done mowing. I advised her to make sure she drinks plenty of water and rests after activity, she verbalized understanding.

## 2023-05-30 RX ORDER — CLOPIDOGREL BISULFATE 75 MG/1
TABLET ORAL
Qty: 90 TABLET | Refills: 0 | Status: SHIPPED | OUTPATIENT
Start: 2023-05-30

## 2023-06-15 DIAGNOSIS — I10 ESSENTIAL HYPERTENSION: ICD-10-CM

## 2023-06-16 RX ORDER — AMLODIPINE BESYLATE 5 MG/1
TABLET ORAL
Qty: 90 TABLET | Refills: 0 | Status: SHIPPED | OUTPATIENT
Start: 2023-06-16

## 2023-06-17 DIAGNOSIS — I48.0 PAROXYSMAL ATRIAL FIBRILLATION: ICD-10-CM

## 2023-06-17 DIAGNOSIS — R00.2 PALPITATIONS: ICD-10-CM

## 2023-06-17 DIAGNOSIS — I10 ESSENTIAL HYPERTENSION: ICD-10-CM

## 2023-06-19 RX ORDER — METOPROLOL TARTRATE 50 MG/1
TABLET, FILM COATED ORAL
Qty: 180 TABLET | Refills: 0 | Status: SHIPPED | OUTPATIENT
Start: 2023-06-19

## 2023-08-15 NOTE — ADDENDUM NOTE
Addended by: BUSHRA CONSTANTINO on: 6/21/2021 04:48 PM     Modules accepted: Orders     V-Y Flap Text: The defect edges were debeveled with a #15 scalpel blade.  Given the location of the defect, shape of the defect and the proximity to free margins a V-Y flap was deemed most appropriate.  Using a sterile surgical marker, an appropriate advancement flap was drawn incorporating the defect and placing the expected incisions within the relaxed skin tension lines where possible.    The area thus outlined was incised deep to adipose tissue with a #15 scalpel blade.  The skin margins were undermined to an appropriate distance in all directions utilizing iris scissors.

## 2023-08-22 RX ORDER — CLOPIDOGREL BISULFATE 75 MG/1
TABLET ORAL
Qty: 90 TABLET | Refills: 0 | Status: SHIPPED | OUTPATIENT
Start: 2023-08-22
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
## 2023-08-30 RX ORDER — CLOPIDOGREL BISULFATE 75 MG/1
TABLET ORAL
Qty: 90 TABLET | Refills: 0 | Status: SHIPPED | OUTPATIENT
Start: 2023-08-30

## 2023-09-01 ENCOUNTER — TELEPHONE (OUTPATIENT)
Dept: CARDIOLOGY | Facility: CLINIC | Age: 83
End: 2023-09-01
Payer: MEDICARE

## 2023-09-01 ENCOUNTER — HOSPITAL ENCOUNTER (OUTPATIENT)
Dept: CARDIOLOGY | Facility: HOSPITAL | Age: 83
Discharge: HOME | End: 2023-09-01
Payer: MEDICARE

## 2023-09-01 DIAGNOSIS — I25.119 CORONARY ARTERY DISEASE INVOLVING NATIVE HEART WITH ANGINA PECTORIS, UNSPECIFIED VESSEL OR LESION TYPE: Primary | ICD-10-CM

## 2023-09-01 DIAGNOSIS — Z00.6 ENCOUNTER FOR EXAMINATION FOR NORMAL COMPARISON AND CONTROL IN CLINICAL RESEARCH PROGRAM: ICD-10-CM

## 2023-09-01 NOTE — TELEPHONE ENCOUNTER
Pt was admitted to [facility] and Dr. Palomino performed a cardiac cath on her today.  He contacted RAYA Harris requesting pt to f/u with our office next week to discuss recommendations.  Email sent to [facility] @ [facility] to [employer] images with  Pamela and an email sent to [facility] to load the images for Dr. Ross Trotter to review.  Urgent EPIC chat message sent to Tegan LOZANO and Veronica DOTSON regarding pt needing urgent appt next week.

## 2023-09-05 ENCOUNTER — TELEPHONE (OUTPATIENT)
Dept: CARDIOLOGY | Facility: CLINIC | Age: 83
End: 2023-09-05

## 2023-09-05 NOTE — TELEPHONE ENCOUNTER
Caller: MIS CASTILLO    Relationship to patient: Emergency Contact    Best call back number: [phone]    Patient is needing: A CALL BACK TO DISCUSS LAST  WEEK'S ED VISIT. PT WAS SEEN FOR CHEST PAINS AND WAS ADMITTED AND HAD A HEART CATH DONE. PT WOULD LIKE TO DISCUSS STINTS OR HAVE  OPEN HEART SURGERY. PLEASE ADVISE THANK YOU

## 2023-09-06 NOTE — TELEPHONE ENCOUNTER
Called and spoke with patient's son, patient has appointment for this Thursday and informed him that Edilson Souza pac with discuss with patient at appointment. Verbalizes understanding.

## 2023-09-07 ENCOUNTER — OFFICE VISIT (OUTPATIENT)
Dept: CARDIOLOGY | Facility: CLINIC | Age: 83
End: 2023-09-07
Payer: MEDICARE

## 2023-09-07 VITALS
SYSTOLIC BLOOD PRESSURE: 131 MMHG | WEIGHT: 142 LBS | BODY MASS INDEX: 22.82 KG/M2 | HEART RATE: 65 BPM | OXYGEN SATURATION: 96 % | HEIGHT: 66 IN | DIASTOLIC BLOOD PRESSURE: 67 MMHG

## 2023-09-07 DIAGNOSIS — I25.119 CORONARY ARTERY DISEASE INVOLVING NATIVE CORONARY ARTERY OF NATIVE HEART WITH ANGINA PECTORIS: Primary | ICD-10-CM

## 2023-09-07 DIAGNOSIS — I10 ESSENTIAL HYPERTENSION: ICD-10-CM

## 2023-09-07 DIAGNOSIS — R06.02 SHORTNESS OF BREATH: ICD-10-CM

## 2023-09-07 PROCEDURE — 99214 OFFICE O/P EST MOD 30 MIN: CPT | Performed by: PHYSICIAN ASSISTANT

## 2023-09-07 PROCEDURE — 3075F SYST BP GE 130 - 139MM HG: CPT | Performed by: PHYSICIAN ASSISTANT

## 2023-09-07 PROCEDURE — 1159F MED LIST DOCD IN RCRD: CPT | Performed by: PHYSICIAN ASSISTANT

## 2023-09-07 PROCEDURE — 3078F DIAST BP <80 MM HG: CPT | Performed by: PHYSICIAN ASSISTANT

## 2023-09-07 PROCEDURE — 1160F RVW MEDS BY RX/DR IN RCRD: CPT | Performed by: PHYSICIAN ASSISTANT

## 2023-09-07 RX ORDER — FERROUS SULFATE 325(65) MG
325 TABLET ORAL DAILY
COMMUNITY
Start: 2023-08-27

## 2023-09-07 RX ORDER — ISOSORBIDE MONONITRATE 30 MG/1
30 TABLET, EXTENDED RELEASE ORAL DAILY
COMMUNITY
Start: 2023-09-01

## 2023-09-07 NOTE — PROGRESS NOTES
Problem list     Subjective   Nanci Pritchard is a 83 y.o. female     Chief Complaint   Patient presents with    Follow-up     6 month follow up- hospital follow up heart cath 9/1/23 dr. hahn   Problem List:  1. Sick sinus syndrome.  1.1. Greater than 3 second pause noted per event monitor.  1.2. The patient was subsequently admitted to [facility] with placement of dual-chamber pacemaker per Dr. Hahn.  1.3. Apparent lead dislodgment with subsequent fixation of the leads the following day post pacemaker placement.  2. Preserved systolic function  3. Chest Discomfort  3.1 Low risk stress test, 09/2016.  3.2 Stress 7/2019 possible anterolateral wall ischemia  3.3 LHC 8/2019 stenting of LAD and RCA  3.4 Repeat [facility] [place], 5/2020, with stenting of the LAD for subtotal IntraStent restenosis.  The patient had moderate but nonobstructive disease otherwise with medical management recommended.  3.5.  Unstable angina, with admission and eventual catheterization, 9/2023.  Catheterization suggested severe diffuse disease involving the LAD (severe diffuse in-stent restenosis), severe diffuse disease of the circumflex, and diffuse disease throughout the RCA.  Bypass was recommended.  4. Hypertension  5. Atrial Fibrillation  6. Fibromyalgia    HPI  The patient presents into the clinic today for follow-up.  She was recently admitted for symptoms compatible with unstable angina.  She eventually had catheterization which supported diffuse three-vessel disease.  Of concern, there was severe and diffuse in-stent restenosis involving the LAD and vessels otherwise.  Bypass was recommended.  She was scheduled for evaluation and consultation for bypass at  [facility] in [place].  That was canceled as the patient wanted to discuss further potentials in the clinic today.  She presents in the setting.  She still does not feel well.  She still has chest tightness.  She has dyspnea and fatigue which are limiting.   She really denies dysrhythmic or failure issues.  She really does not feel well, limited primarily because of potential cardiac symptoms of chest tightness, dyspnea, and fatigue.  We have reviewed cath films in detail with the patient today.  We have discussed all options.    Current Outpatient Medications on File Prior to Visit   Medication Sig Dispense Refill    amLODIPine (NORVASC) 5 MG tablet Take 1 tablet by mouth once daily 90 tablet 0    aspirin 81 MG EC tablet Take 1 tablet by mouth Daily.      Calcium Carbonate 1500 (600 Ca) MG tablet Take 1 tablet by mouth Daily.      clopidogrel (PLAVIX) 75 MG tablet Take 1 tablet by mouth once daily 90 tablet 0    Dapagliflozin Propanediol (Farxiga) 10 MG tablet Take  by mouth.      DULoxetine (CYMBALTA) 60 MG capsule Take 1 capsule by mouth Daily.      empagliflozin (JARDIANCE) 25 MG tablet tablet Take  by mouth Daily.      Evolocumab (REPATHA) solution auto-injector SureClick injection Inject 1 mL under the skin into the appropriate area as directed Every 14 (Fourteen) Days. 2 mL 11    FeroSul 325 (65 Fe) MG tablet Take 1 tablet by mouth Daily.      gabapentin (NEURONTIN) 300 MG capsule Take 1 capsule by mouth 3 (Three) Times a Day.      isosorbide mononitrate (IMDUR) 30 MG 24 hr tablet Take 1 tablet by mouth Daily.      lisinopril (PRINIVIL,ZESTRIL) 20 MG tablet Take 1 tablet by mouth twice daily 180 tablet 0    metFORMIN (GLUCOPHAGE) 500 MG tablet Take 2 tablets by mouth 2 (Two) Times a Day With Meals.      metoprolol tartrate (LOPRESSOR) 50 MG tablet Take 1 tablet by mouth twice daily 180 tablet 0    Multiple Vitamins-Minerals (EYE VITAMINS) capsule Take  by mouth.      nitroglycerin (NITROSTAT) 0.4 MG SL tablet 1 under the tongue as needed for angina, may repeat q5mins for up three doses 30 tablet 5    Omega-3 Fatty Acids (OMEGA-3 FISH OIL PO) Take  by mouth.      omeprazole (priLOSEC) 40 MG capsule Take 1 capsule by mouth Daily.       No current  facility-administered medications on file prior to visit.       Praluent [alirocumab] and Soma [carisoprodol]    Past Medical History:   Diagnosis Date    Arrhythmia     Atrial fibrillation     Encounter for monitoring Coumadin therapy     Fibromyalgia     Fracture of face bones due to fall, closed, initial encounter     Histoplasmosis     Hyperlipidemia     Hypertension     Osteoarthritis     SSS (sick sinus syndrome)        Social History     Socioeconomic History    Marital status:    Tobacco Use    Smoking status: Never    Smokeless tobacco: Never   Substance and Sexual Activity    Alcohol use: No    Drug use: No    Sexual activity: Defer       Family History   Problem Relation Age of Onset    Heart disease Mother     Heart disease Father        Review of Systems   Constitutional:  Negative for chills, diaphoresis, fatigue and fever.   Eyes: Negative.  Negative for visual disturbance (wears glasses).   Respiratory:  Positive for shortness of breath. Negative for apnea, cough, chest tightness and wheezing.    Cardiovascular:  Positive for chest pain, palpitations and leg swelling.   Gastrointestinal: Negative.  Negative for abdominal pain and blood in stool.   Endocrine: Negative.  Negative for cold intolerance and heat intolerance.   Genitourinary: Negative.  Negative for hematuria.   Musculoskeletal:  Positive for myalgias (fibro). Negative for arthralgias, back pain, neck pain and neck stiffness.   Skin: Negative.  Negative for rash and wound.   Allergic/Immunologic: Negative.  Negative for environmental allergies and food allergies.   Neurological:  Positive for weakness (low extremities) and headaches (since starting imdur). Negative for dizziness, syncope, light-headedness and numbness.   Hematological:  Bruises/bleeds easily.   Psychiatric/Behavioral:  Negative for sleep disturbance.      Objective   Vitals:    09/07/23 1328   BP: 131/67   BP Location: Left arm   Patient Position: Sitting   Cuff  "Size: Adult   Pulse: 65   SpO2: 96%   Weight: 64.4 kg (142 lb)   Height: 167.6 cm (66\")      /67 (BP Location: Left arm, Patient Position: Sitting, Cuff Size: Adult)   Pulse 65   Ht 167.6 cm (66\")   Wt 64.4 kg (142 lb)   SpO2 96%   BMI 22.92 kg/m²    Lab Results (most recent)       None          Physical Exam  Vitals and nursing note reviewed.   Constitutional:       General: She is not in acute distress.     Appearance: She is well-developed.   HENT:      Head: Normocephalic and atraumatic.   Eyes:      Conjunctiva/sclera: Conjunctivae normal.      Pupils: Pupils are equal, round, and reactive to light.   Neck:      Vascular: No JVD.      Trachea: No tracheal deviation.   Cardiovascular:      Rate and Rhythm: Normal rate and regular rhythm.      Heart sounds: Normal heart sounds.   Pulmonary:      Effort: Pulmonary effort is normal.      Breath sounds: Normal breath sounds.   Abdominal:      General: Bowel sounds are normal. There is no distension.      Palpations: Abdomen is soft. There is no mass.      Tenderness: There is no abdominal tenderness. There is no guarding or rebound.   Musculoskeletal:         General: No tenderness or deformity. Normal range of motion.      Cervical back: Normal range of motion and neck supple.   Skin:     General: Skin is warm and dry.      Coloration: Skin is not pale.      Findings: No erythema or rash.   Neurological:      Mental Status: She is alert and oriented to person, place, and time.   Psychiatric:         Behavior: Behavior normal.         Thought Content: Thought content normal.         Judgment: Judgment normal.         Procedure   Procedures       Assessment & Plan      Diagnosis Plan   1. Coronary artery disease involving native coronary artery of native heart with angina pectoris        2. Shortness of breath        3. Essential hypertension            1.  The patient presents in for follow-up status post recent hospitalization.  The patient was admitted " basically in the setting of unstable angina.  Catheterization was performed and indicated results as above.  In short, she had diffuse coronary disease with bypass recommended.  She was scheduled for consultation with surgical services at [facility] in [place].  That was canceled for unknown reasons.  She wanted to be evaluated today before consultation with surgical services.    2.  I have reviewed cath films in detail with the patient.  I have discussed the need for coronary artery bypass grafting.  She acknowledges that need.  She is very concerned about bypass.  She would want to hold on that for now.  There was also consideration made for laser atherectomy or brachytherapy.  She is also concerned about these treatment modalities.    3.  We have discussed all treatment modalities including bypass and other procedures as above.  She wants to discuss all options with family members.  She wants to hold on any further revascularization procedures/mechanical interventions.  She will discuss this with family and call back if she decides to proceed with the same.    4.  As she calls back, we can adjust medications for further cardioprotection if she decides not to proceed with mechanical intervention.  I would like to see her back in a couple of weeks and discuss further at that time.  She hopefully will reach a decision at that time.  We can further adjust at that time if needed.  She will return immediately if needed.  Further pending above.         Advance Care Planning   ACP discussion was held with the patient during this visit. Patient has an advance directive (not in EMR), copy requested.       Electronically signed by:

## 2023-09-15 ENCOUNTER — OFFICE VISIT (OUTPATIENT)
Dept: CARDIOLOGY | Facility: CLINIC | Age: 83
End: 2023-09-15
Payer: MEDICARE

## 2023-09-15 DIAGNOSIS — I49.5 SICK SINUS SYNDROME: Primary | ICD-10-CM

## 2023-09-15 PROCEDURE — 93280 PM DEVICE PROGR EVAL DUAL: CPT | Performed by: INTERNAL MEDICINE

## 2023-09-27 ENCOUNTER — TELEPHONE (OUTPATIENT)
Dept: CARDIOLOGY | Facility: CLINIC | Age: 83
End: 2023-09-27

## 2023-09-27 NOTE — TELEPHONE ENCOUNTER
Patient experiencing some tightness in her chest, pounding in her head and weakness. Unable to check vitals. Records printed for review. He states patient was not a candidate for CABG as previously discussed. Nurse visit scheduled 9/29. Will notify patient's son with any recommendations. She will proceed to the ER with any concerns in the meantime.

## 2023-09-27 NOTE — TELEPHONE ENCOUNTER
Caller: MIS CASTILLO     Relationship: SON    Best call back number: [phone]    What is your medical concern? PT STARTED HAVING PRESSURE IN HER CHEST AND HEAD DURING [employer] [facility] SUNDAY. SHE WAS GIVEN A NITRO TO HELP, SHE PASSED OUT AND COULD NOT COME TO FOR 30 MINS. CALLED AN AMBULANCE AND TOOK HER TO THE ED. DID BLOOD WORK, CT SCAN AND CHEST XRAY. GAVE HER A NITRO DROP DURING THE TIME    SHE OPTED TO GO HOME EARLY, BUT IS STILL HAVING CHEST PRESSURE AND HEAD PRESSURE.     PT WENT TO [facility]      How long has this issue been going on? VERY RECENTLY    Is your provider already aware of this issue? NO    Have you been treated for this issue? UNSURE OF CURRENT CONDITIONS

## 2023-09-29 ENCOUNTER — CLINICAL SUPPORT (OUTPATIENT)
Dept: CARDIOLOGY | Facility: CLINIC | Age: 83
End: 2023-09-29
Payer: MEDICARE

## 2023-09-29 VITALS — SYSTOLIC BLOOD PRESSURE: 130 MMHG | DIASTOLIC BLOOD PRESSURE: 71 MMHG | OXYGEN SATURATION: 96 % | HEART RATE: 61 BPM

## 2023-09-29 DIAGNOSIS — R06.02 SHORTNESS OF BREATH: Primary | ICD-10-CM

## 2023-09-29 RX ORDER — GLIMEPIRIDE 4 MG/1
4 TABLET ORAL
COMMUNITY

## 2023-09-29 RX ORDER — RANOLAZINE 500 MG/1
500 TABLET, EXTENDED RELEASE ORAL 2 TIMES DAILY
Qty: 60 TABLET | Refills: 3 | Status: SHIPPED | OUTPATIENT
Start: 2023-09-29

## 2023-09-29 RX ORDER — LEVOTHYROXINE SODIUM 0.03 MG/1
25 TABLET ORAL
COMMUNITY

## 2023-09-29 RX ORDER — KETOROLAC TROMETHAMINE 10 MG/1
10 TABLET, FILM COATED ORAL EVERY 6 HOURS PRN
COMMUNITY

## 2023-09-29 NOTE — PROGRESS NOTES
Nanci Pritchard  1940 9/29/2023   ?   Chief Complaint   Patient presents with    Nurse Visit      Symptom check / was in ER 9/22/2023 for chest pain       ?   HPI:   ?   ?   Patient Presented to the office today for a symptom check and EKG , she was recently seen at the local ER due to some chest pain. She states today she has been feeling better since being out of the hospital with very little chest tightness.   ?     Current Outpatient Medications:     amLODIPine (NORVASC) 5 MG tablet, Take 1 tablet by mouth once daily, Disp: 90 tablet, Rfl: 0    aspirin 81 MG EC tablet, Take 1 tablet by mouth Daily., Disp: , Rfl:     Calcium Carbonate 1500 (600 Ca) MG tablet, Take 1 tablet by mouth Daily., Disp: , Rfl:     clopidogrel (PLAVIX) 75 MG tablet, Take 1 tablet by mouth once daily, Disp: 90 tablet, Rfl: 0    Dapagliflozin Propanediol (Farxiga) 10 MG tablet, Take  by mouth., Disp: , Rfl:     DULoxetine (CYMBALTA) 60 MG capsule, Take 1 capsule by mouth Daily., Disp: , Rfl:     empagliflozin (JARDIANCE) 25 MG tablet tablet, Take  by mouth Daily., Disp: , Rfl:     Evolocumab (REPATHA) solution auto-injector SureClick injection, Inject 1 mL under the skin into the appropriate area as directed Every 14 (Fourteen) Days., Disp: 2 mL, Rfl: 11    FeroSul 325 (65 Fe) MG tablet, Take 1 tablet by mouth Daily., Disp: , Rfl:     gabapentin (NEURONTIN) 300 MG capsule, Take 1 capsule by mouth 3 (Three) Times a Day., Disp: , Rfl:     isosorbide mononitrate (IMDUR) 30 MG 24 hr tablet, Take 1 tablet by mouth Daily., Disp: , Rfl:     lisinopril (PRINIVIL,ZESTRIL) 20 MG tablet, Take 1 tablet by mouth twice daily, Disp: 180 tablet, Rfl: 0    metFORMIN (GLUCOPHAGE) 500 MG tablet, Take 2 tablets by mouth 2 (Two) Times a Day With Meals., Disp: , Rfl:     metoprolol tartrate (LOPRESSOR) 50 MG tablet, Take 1 tablet by mouth twice daily, Disp: 180 tablet, Rfl: 0    Multiple Vitamins-Minerals (EYE VITAMINS) capsule, Take  by mouth., Disp: ,  Rfl:     nitroglycerin (NITROSTAT) 0.4 MG SL tablet, 1 under the tongue as needed for angina, may repeat q5mins for up three doses, Disp: 30 tablet, Rfl: 5    Omega-3 Fatty Acids (OMEGA-3 FISH OIL PO), Take  by mouth., Disp: , Rfl:     omeprazole (priLOSEC) 40 MG capsule, Take 1 capsule by mouth Daily., Disp: , Rfl:    ?   ?   Praluent [alirocumab] and Soma [carisoprodol]         ECG 12 Lead    Date/Time: 9/29/2023 10:42 AM  Performed by: Edilson Souza PA  Authorized by: Edilson Souza PA   Comparison: compared with previous ECG from 8/29/2023         ?   Assessment & Plan    ?   ?   ?                                           CAD    Chart/ EKG/ Medications / ER records reviewed by Edilson Souza PAC       1. Advised patient of recent testing results again along with her options to treat issues.    2. Discussed adding Renexa 500 mg to medication plan , she was agreeable.    3. Patient is to monitor symptoms and call with any concerns , she will follow up in 2-3 weeks for a nurse visit.     Patient verbalized understanding     AT [facility]

## 2023-10-03 DIAGNOSIS — I10 ESSENTIAL HYPERTENSION: ICD-10-CM

## 2023-10-03 DIAGNOSIS — R00.2 PALPITATIONS: ICD-10-CM

## 2023-10-03 DIAGNOSIS — I48.0 PAROXYSMAL ATRIAL FIBRILLATION: ICD-10-CM

## 2023-10-03 RX ORDER — METOPROLOL TARTRATE 50 MG/1
TABLET, FILM COATED ORAL
Qty: 180 TABLET | Refills: 0 | Status: SHIPPED | OUTPATIENT
Start: 2023-10-03 | End: 2023-10-05

## 2023-10-03 RX ORDER — AMLODIPINE BESYLATE 5 MG/1
TABLET ORAL
Qty: 90 TABLET | Refills: 0 | Status: SHIPPED | OUTPATIENT
Start: 2023-10-03 | End: 2023-10-05

## 2023-10-04 DIAGNOSIS — I10 ESSENTIAL HYPERTENSION: ICD-10-CM

## 2023-10-04 DIAGNOSIS — R00.2 PALPITATIONS: ICD-10-CM

## 2023-10-04 DIAGNOSIS — I48.0 PAROXYSMAL ATRIAL FIBRILLATION: ICD-10-CM

## 2023-10-05 RX ORDER — AMLODIPINE BESYLATE 5 MG/1
TABLET ORAL
Qty: 90 TABLET | Refills: 0 | Status: SHIPPED | OUTPATIENT
Start: 2023-10-05

## 2023-10-05 RX ORDER — METOPROLOL TARTRATE 50 MG/1
TABLET, FILM COATED ORAL
Qty: 180 TABLET | Refills: 0 | Status: SHIPPED | OUTPATIENT
Start: 2023-10-05

## 2023-10-09 DIAGNOSIS — I10 ESSENTIAL HYPERTENSION: ICD-10-CM

## 2023-10-09 RX ORDER — LISINOPRIL 20 MG/1
TABLET ORAL
Qty: 180 TABLET | Refills: 0 | Status: SHIPPED | OUTPATIENT
Start: 2023-10-09

## 2023-11-21 ENCOUNTER — TELEPHONE (OUTPATIENT)
Dept: CARDIOLOGY | Facility: CLINIC | Age: 83
End: 2023-11-21
Payer: MEDICARE

## 2023-11-21 NOTE — TELEPHONE ENCOUNTER
Brenda and several insurance companies are still in negotiation, we won't know for sure for several companies until January. Any patient can continue to be seen here, they may just have a larger out of pocket cost.       Called and informed pt of the above, she verbalized understanding and stated she is looking at changing insurance.
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
## 2023-12-04 ENCOUNTER — TELEPHONE (OUTPATIENT)
Dept: CARDIOLOGY | Facility: CLINIC | Age: 83
End: 2023-12-04
Payer: MEDICARE

## 2023-12-04 NOTE — TELEPHONE ENCOUNTER
Caller: MIS CASTILLO    Relationship: Emergency Contact    Best call back number: [phone]    What is the best time to reach you: ANY    Who are you requesting to speak with (clinical staff, provider,  specific staff member): CLINICAL      What was the call regarding: PT IS SWITCHING TO ANTHEM AT THE BEGINNING OF THE YEAR AND THE OFFICE TO BE AWARE FOR ANY FUTURE APPTS, SHE IS GOOD TO GO    PT WENT TO DR. ESPAÑA WITH VASCULAR SURGERY FOR THE BLOCKAGES IN HER NECK AND HE REFUSED TO GO ANY FURTHER WITH HER STATING HE COULDN'T DO ANYTHING. WHEN POSSIBLE, CALL IN AND SPEAK ON ANYTHING THAT CAN BE TO FURTHER TREAT THESE ISSUES.

## 2023-12-07 RX ORDER — EVOLOCUMAB 420 MG/3.5
KIT SUBCUTANEOUS
Qty: 12 ML | Refills: 0 | OUTPATIENT
Start: 2023-12-07

## 2023-12-12 NOTE — TELEPHONE ENCOUNTER
Patient called for refill of Repatha, sent as requested.   [facility] faxed for Repatha 420 at patient request. Patient confirmed monthly cartridge.    Kal Souza PA-C.

## 2023-12-19 RX ORDER — RANOLAZINE 500 MG/1
500 TABLET, EXTENDED RELEASE ORAL 2 TIMES DAILY
Qty: 180 TABLET | Refills: 3 | Status: SHIPPED | OUTPATIENT
Start: 2023-12-19

## 2024-01-07 DIAGNOSIS — R00.2 PALPITATIONS: ICD-10-CM

## 2024-01-07 DIAGNOSIS — I10 ESSENTIAL HYPERTENSION: ICD-10-CM

## 2024-01-07 DIAGNOSIS — I48.0 PAROXYSMAL ATRIAL FIBRILLATION: ICD-10-CM

## 2024-01-08 RX ORDER — METOPROLOL TARTRATE 50 MG/1
TABLET, FILM COATED ORAL
Qty: 180 TABLET | Refills: 0 | Status: SHIPPED | OUTPATIENT
Start: 2024-01-08

## 2024-01-08 RX ORDER — AMLODIPINE BESYLATE 5 MG/1
TABLET ORAL
Qty: 90 TABLET | Refills: 0 | Status: SHIPPED | OUTPATIENT
Start: 2024-01-08

## 2024-01-10 DIAGNOSIS — I10 ESSENTIAL HYPERTENSION: ICD-10-CM

## 2024-01-10 RX ORDER — LISINOPRIL 20 MG/1
TABLET ORAL
Qty: 180 TABLET | Refills: 0 | Status: SHIPPED | OUTPATIENT
Start: 2024-01-10

## 2024-01-30 ENCOUNTER — TELEPHONE (OUTPATIENT)
Dept: CARDIOLOGY | Facility: CLINIC | Age: 84
End: 2024-01-30
Payer: MEDICARE

## 2024-01-30 NOTE — TELEPHONE ENCOUNTER
Relay...  Patient left message that she received a letter from [employer] that Repatha will no longer be covered. She request a call at [phone].     Attempted to call patient however no answer on either line or option for voicemail. Pharmacy will send prior authorization request with new prescription coverage as insurance changed this year. Patient no longer has [employer] as noted in chart. Prior authorization will be submitted when received.

## 2024-02-18 RX ORDER — CLOPIDOGREL BISULFATE 75 MG/1
TABLET ORAL
Qty: 90 TABLET | Refills: 0 | Status: SHIPPED | OUTPATIENT
Start: 2024-02-18

## 2024-02-27 NOTE — TELEPHONE ENCOUNTER
Patient called for refill of Repatha as she is due injection in 2 weeks. She has been receiving letters that this is no longer covered. She no longer has [employer] insurance. She now has [employer] insurance, not on file with office. Advised I will submit refill in order to prompt prior authorization with new prescription coverage.

## 2024-03-08 ENCOUNTER — TELEPHONE (OUTPATIENT)
Dept: CARDIOLOGY | Facility: CLINIC | Age: 84
End: 2024-03-08

## 2024-03-08 NOTE — TELEPHONE ENCOUNTER
Caller: Nanci Pritchard    Relationship: Self    Best call back number: [phone]    What is the best time to reach you: ANYTIME    Who are you requesting to speak with (clinical staff, provider,  specific staff member): CLINICAL      What was the call regarding: PATIENT IS CALLING ASKING WHEN SHE SHOULD COME IN AGAIN FOR A PACE MAKER CHECK. PATIENT HAS APPOINTMENT FOR OFFICE VISIT ON 5-22-24. PLEASE REACH OUT TO PATIENT.    Is it okay if the provider responds through MyChart: PREFER A CALL

## 2024-03-11 ENCOUNTER — PRIOR AUTHORIZATION (OUTPATIENT)
Dept: CARDIOLOGY | Facility: CLINIC | Age: 84
End: 2024-03-11
Payer: MEDICARE

## 2024-03-11 NOTE — TELEPHONE ENCOUNTER
Prior authorization request received for Repatha. Authorization submitted through Cover My Meds. Status pending.        PA Case: [id number]  Status: Approved  Coverage Starts on: 1/1/2024   Coverage Ends on: 3/11/2025

## 2024-03-15 ENCOUNTER — OFFICE VISIT (OUTPATIENT)
Dept: CARDIOLOGY | Facility: CLINIC | Age: 84
End: 2024-03-15
Payer: MEDICARE

## 2024-03-15 DIAGNOSIS — R00.2 PALPITATIONS: Primary | ICD-10-CM

## 2024-04-04 DIAGNOSIS — I10 ESSENTIAL HYPERTENSION: ICD-10-CM

## 2024-04-04 RX ORDER — LISINOPRIL 20 MG/1
TABLET ORAL
Qty: 180 TABLET | Refills: 0 | Status: SHIPPED | OUTPATIENT
Start: 2024-04-04

## 2024-05-17 RX ORDER — CLOPIDOGREL BISULFATE 75 MG/1
TABLET ORAL
Qty: 90 TABLET | Refills: 0 | Status: SHIPPED | OUTPATIENT
Start: 2024-05-17

## 2024-05-22 ENCOUNTER — OFFICE VISIT (OUTPATIENT)
Dept: CARDIOLOGY | Facility: CLINIC | Age: 84
End: 2024-05-22
Payer: MEDICARE

## 2024-05-22 VITALS
HEIGHT: 66 IN | DIASTOLIC BLOOD PRESSURE: 70 MMHG | BODY MASS INDEX: 23.78 KG/M2 | SYSTOLIC BLOOD PRESSURE: 133 MMHG | OXYGEN SATURATION: 90 % | HEART RATE: 60 BPM | WEIGHT: 148 LBS

## 2024-05-22 DIAGNOSIS — I25.119 CORONARY ARTERY DISEASE INVOLVING NATIVE CORONARY ARTERY OF NATIVE HEART WITH ANGINA PECTORIS: Primary | ICD-10-CM

## 2024-05-22 DIAGNOSIS — R07.2 PRECORDIAL PAIN: ICD-10-CM

## 2024-05-22 DIAGNOSIS — R06.02 SHORTNESS OF BREATH: ICD-10-CM

## 2024-05-22 RX ORDER — GABAPENTIN 300 MG/1
300 CAPSULE ORAL DAILY
COMMUNITY

## 2024-05-22 RX ORDER — MAGNESIUM OXIDE 400 MG/1
400 TABLET ORAL 2 TIMES DAILY
COMMUNITY

## 2024-05-22 NOTE — PROGRESS NOTES
Problem list     Subjective   Nanci Pritchard is a 84 y.o. female     Chief Complaint   Patient presents with    Follow-up     8 month follow up     Chest Pain     With activity    Shortness of Breath     With activity    Palpitations     With activity    Loss of Consciousness   Problem List:  1. Sick sinus syndrome.  1.1. Greater than 3 second pause noted per event monitor.  1.2. The patient was subsequently admitted to [facility] with placement of dual-chamber pacemaker per Dr. Gutierrez.  1.3. Apparent lead dislodgment with subsequent fixation of the leads the following day post pacemaker placement.  2. Preserved systolic function  3. Chest Discomfort  3.1 Low risk stress test, 09/2016.  3.2 Stress 7/2019 possible anterolateral wall ischemia  3.3 LHC 8/2019 stenting of LAD and RCA  3.4 Repeat [facility], 5/2020, with stenting of the LAD for subtotal IntraStent restenosis.  The patient had moderate but nonobstructive disease otherwise with medical management recommended.  3.5.  Unstable angina, with admission and eventual catheterization, 9/2023.  Catheterization suggested severe diffuse disease involving the LAD (severe diffuse in-stent restenosis), severe diffuse disease of the circumflex, and diffuse disease throughout the RCA.  Bypass was recommended.  4. Hypertension  5. Atrial Fibrillation  6. Fibromyalgia    HPI  The patient presents in clinic today for routine evaluation and follow-up.  She presents because of symptoms.  She notes increasing chest pain, dyspnea, and fatigue with lower levels of activity now.  Baseline dyspnea has intensified as well.  She has coronary artery disease history as outlined above.  Again previous bypass was recommended but she opted not to pursue that.  She would potentially be interested in catheter-based intervention but would really like to avoid surgical intervention.  She does not feel that she could possibly do well postoperatively.  She denies failure  symptoms.  She still has some degree of symptoms of A-fib.  She has no further complaints at this time.  We had reviewed historically consideration for anticoagulation therapy.  Given frequent risk of falls at home, she has opted not to institute that.  She will call should she decide the same.    Current Outpatient Medications on File Prior to Visit   Medication Sig Dispense Refill    amLODIPine (NORVASC) 5 MG tablet Take 1 tablet by mouth once daily 90 tablet 0    aspirin 81 MG EC tablet Take 1 tablet by mouth Daily.      clopidogrel (PLAVIX) 75 MG tablet Take 1 tablet by mouth once daily 90 tablet 0    Dapagliflozin Propanediol (Farxiga) 10 MG tablet Take 5 mg by mouth Daily.      DICLOFENAC PO Take  by mouth. 75 mg bid      DULoxetine (CYMBALTA) 60 MG capsule Take 1 capsule by mouth Daily.      Evolocumab with Infusor (REPATHA) solution cartridge Inject 3.5 mL under the skin into the appropriate area as directed Every 30 (Thirty) Days. 3.5 mL 11    FeroSul 325 (65 Fe) MG tablet Take 1 tablet by mouth Daily.      gabapentin (NEURONTIN) 300 MG capsule Take 1 capsule by mouth Daily.      glimepiride (AMARYL) 4 MG tablet Take 1 tablet by mouth Every Morning Before Breakfast.      levothyroxine (SYNTHROID, LEVOTHROID) 25 MCG tablet Take 1 tablet by mouth Every Morning.      lisinopril (PRINIVIL,ZESTRIL) 20 MG tablet Take 1 tablet by mouth twice daily 180 tablet 0    magnesium oxide (MAG-OX) 400 MG tablet Take 1 tablet by mouth 2 (Two) Times a Day.      metFORMIN (GLUCOPHAGE) 500 MG tablet Take 2 tablets by mouth 2 (Two) Times a Day With Meals.      metoprolol tartrate (LOPRESSOR) 50 MG tablet Take 1 tablet by mouth twice daily 180 tablet 0    Multiple Vitamins-Minerals (EYE VITAMINS) capsule Take  by mouth.      nitroglycerin (NITROSTAT) 0.4 MG SL tablet 1 under the tongue as needed for angina, may repeat q5mins for up three doses 30 tablet 5    Omega-3 Fatty Acids (OMEGA-3 FISH OIL PO) Take  by mouth.       omeprazole (priLOSEC) 40 MG capsule Take 1 capsule by mouth Daily.      ranolazine (Ranexa) 500 MG 12 hr tablet Take 1 tablet by mouth 2 (Two) Times a Day. 180 tablet 3     No current facility-administered medications on file prior to visit.       Praluent [alirocumab] and Soma [carisoprodol]    Past Medical History:   Diagnosis Date    Arrhythmia     Atrial fibrillation     Encounter for monitoring Coumadin therapy     Fibromyalgia     Fracture of face bones due to fall, closed, initial encounter     Histoplasmosis     Hyperlipidemia     Hypertension     Osteoarthritis     SSS (sick sinus syndrome)        Social History     Socioeconomic History    Marital status:    Tobacco Use    Smoking status: Never    Smokeless tobacco: Never   Substance and Sexual Activity    Alcohol use: No    Drug use: No    Sexual activity: Defer       Family History   Problem Relation Age of Onset    Heart disease Mother     Heart disease Father        Review of Systems   Constitutional:  Positive for fatigue. Negative for chills, diaphoresis and fever.   Eyes: Negative.  Negative for visual disturbance.   Respiratory:  Positive for shortness of breath. Negative for apnea, cough, chest tightness and wheezing.    Cardiovascular:  Positive for chest pain, palpitations and leg swelling.   Gastrointestinal: Negative.  Negative for abdominal pain and blood in stool.   Endocrine: Negative.    Genitourinary: Negative.  Negative for hematuria.   Musculoskeletal:  Positive for arthralgias. Negative for back pain, myalgias, neck pain and neck stiffness.   Skin: Negative.  Negative for rash and wound.   Allergic/Immunologic: Positive for environmental allergies (seasonal). Negative for food allergies.   Neurological:  Positive for dizziness, syncope and headaches. Negative for weakness, light-headedness and numbness.   Hematological:  Bruises/bleeds easily (bruises easily).   Psychiatric/Behavioral: Negative.  Negative for sleep disturbance.   "      Objective   Vitals:    05/22/24 1307   BP: 133/70   Pulse: 60   SpO2: 90%   Weight: 67.1 kg (148 lb)   Height: 167.6 cm (66\")      /70   Pulse 60   Ht 167.6 cm (66\")   Wt 67.1 kg (148 lb)   SpO2 90%   BMI 23.89 kg/m²    Lab Results (most recent)       None          Physical Exam  Vitals and nursing note reviewed.   Constitutional:       General: She is not in acute distress.     Appearance: She is well-developed.   HENT:      Head: Normocephalic and atraumatic.   Eyes:      Conjunctiva/sclera: Conjunctivae normal.      Pupils: Pupils are equal, round, and reactive to light.   Neck:      Vascular: No JVD.      Trachea: No tracheal deviation.   Cardiovascular:      Rate and Rhythm: Normal rate and regular rhythm.      Heart sounds: Normal heart sounds.   Pulmonary:      Effort: Pulmonary effort is normal.      Breath sounds: Normal breath sounds.   Abdominal:      General: Bowel sounds are normal. There is no distension.      Palpations: Abdomen is soft. There is no mass.      Tenderness: There is no abdominal tenderness. There is no guarding or rebound.   Musculoskeletal:         General: No tenderness or deformity. Normal range of motion.      Cervical back: Normal range of motion and neck supple.   Skin:     General: Skin is warm and dry.      Coloration: Skin is not pale.      Findings: No erythema or rash.   Neurological:      Mental Status: She is alert and oriented to person, place, and time.   Psychiatric:         Behavior: Behavior normal.         Thought Content: Thought content normal.         Judgment: Judgment normal.           Procedure     ECG 12 Lead    Date/Time: 5/22/2024 1:17 PM  Performed by: Edilson Souza PA    Authorized by: Edilson Souza PA  Comparison: compared with previous ECG from 9/29/2023  Comparison to previous ECG: Atrial pacing, ventricular sensing, no acute changes noted.             Assessment & Plan      Diagnosis Plan   1. Coronary artery disease involving " native coronary artery of native heart with angina pectoris        2. Precordial pain        3. Shortness of breath          1.  The patient would like to discuss symptoms.  She presents today for the same.  She currently reports progressive chest discomfort, dyspnea, and overall exercise intolerance.  She is concerned with clinical scenario.  She would agree for consideration of catheter-based intervention because of diffuse coronary disease as above.  She would like to avoid surgical intervention.  I have asked that the cath films be reviewed by Dr. Lopez to see if there is indeed any targets for intervention.  We can recommend further after review.    2.  For now, we will continue medical therapy.  Based on review of cath films, we can further augment cardioprotective/antianginal medications accordingly.  She has been very intolerant to medications in the past.    3.  She will call if she decides to institute anticoagulation, all as above.    4.  Further pending review of cath films.           Advance Care Planning   ACP discussion was held with the patient during this visit. Patient has an advance directive in EMR which is still valid.            Electronically signed by:

## 2024-07-01 DIAGNOSIS — I10 ESSENTIAL HYPERTENSION: ICD-10-CM

## 2024-07-01 RX ORDER — AMLODIPINE BESYLATE 5 MG/1
TABLET ORAL
Qty: 90 TABLET | Refills: 0 | Status: SHIPPED | OUTPATIENT
Start: 2024-07-01

## 2024-07-08 DIAGNOSIS — I48.0 PAROXYSMAL ATRIAL FIBRILLATION: ICD-10-CM

## 2024-07-08 DIAGNOSIS — I10 ESSENTIAL HYPERTENSION: ICD-10-CM

## 2024-07-08 DIAGNOSIS — R00.2 PALPITATIONS: ICD-10-CM

## 2024-07-08 RX ORDER — METOPROLOL TARTRATE 50 MG/1
TABLET, FILM COATED ORAL
Qty: 180 TABLET | Refills: 0 | Status: SHIPPED | OUTPATIENT
Start: 2024-07-08

## 2024-07-23 RX ORDER — FUROSEMIDE 20 MG/1
20 TABLET ORAL DAILY PRN
Qty: 30 TABLET | Refills: 2 | Status: SHIPPED | OUTPATIENT
Start: 2024-07-23

## 2024-07-23 NOTE — TELEPHONE ENCOUNTER
Received refill request from [facility] pharmacy for lasix 20 mg daily as needed for 3 pound weight gain.    This medication is not on patient med list, called patient confirmed that she is taking as needed. Per Edilson fuentes to refill.     Rx submitted to pharmacy.

## 2024-08-12 DIAGNOSIS — I10 ESSENTIAL HYPERTENSION: ICD-10-CM

## 2024-08-12 RX ORDER — LISINOPRIL 20 MG/1
TABLET ORAL
Qty: 180 TABLET | Refills: 0 | Status: SHIPPED | OUTPATIENT
Start: 2024-08-12

## 2024-08-12 RX ORDER — CLOPIDOGREL BISULFATE 75 MG/1
TABLET ORAL
Qty: 90 TABLET | Refills: 0 | Status: SHIPPED | OUTPATIENT
Start: 2024-08-12

## 2024-09-03 ENCOUNTER — TELEPHONE (OUTPATIENT)
Dept: CARDIOLOGY | Facility: CLINIC | Age: 84
End: 2024-09-03
Payer: MEDICARE

## 2024-09-03 NOTE — TELEPHONE ENCOUNTER
Caller: Macho Nanci    Relationship: Self    Best call back number: [phone]    What is the best time to reach you: ANY    Who are you requesting to speak with (clinical staff, provider,  specific staff member): CLINICAL      What was the call regarding: PT BOUGHT HER A WEED EATER FOR HER 100FT LONG FLOWER BED. SHE IS NEEDING TO WEED IT, THE WEED EATER IS AROUND 4LBS AND IS WONDERING WITH HER PACEMAKER IF WEEDING EATING HER FLOWER BED WOULD BE TOO IMPACTFUL ON HER PACER OR WHAT WOULD SHE RECOMMEND HIM DOING.   
Called and informed pt that it shouldn't be an issue w/ her device and to call us w/ any problem, she verbalized understanding.   
no

## 2024-09-20 ENCOUNTER — OFFICE VISIT (OUTPATIENT)
Dept: CARDIOLOGY | Facility: CLINIC | Age: 84
End: 2024-09-20
Payer: MEDICARE

## 2024-09-20 DIAGNOSIS — I49.5 SICK SINUS SYNDROME: Primary | ICD-10-CM

## 2024-09-23 ENCOUNTER — TELEPHONE (OUTPATIENT)
Dept: CARDIOLOGY | Facility: CLINIC | Age: 84
End: 2024-09-23
Payer: MEDICARE

## 2024-09-30 DIAGNOSIS — I10 ESSENTIAL HYPERTENSION: ICD-10-CM

## 2024-09-30 RX ORDER — AMLODIPINE BESYLATE 5 MG/1
TABLET ORAL
Qty: 90 TABLET | Refills: 0 | Status: SHIPPED | OUTPATIENT
Start: 2024-09-30

## 2024-10-04 DIAGNOSIS — I48.0 PAROXYSMAL ATRIAL FIBRILLATION: ICD-10-CM

## 2024-10-04 DIAGNOSIS — I10 ESSENTIAL HYPERTENSION: ICD-10-CM

## 2024-10-04 DIAGNOSIS — R00.2 PALPITATIONS: ICD-10-CM

## 2024-10-07 RX ORDER — METOPROLOL TARTRATE 50 MG
TABLET ORAL
Qty: 180 TABLET | Refills: 0 | Status: SHIPPED | OUTPATIENT
Start: 2024-10-07

## 2024-10-13 DIAGNOSIS — R00.2 PALPITATIONS: ICD-10-CM

## 2024-10-13 DIAGNOSIS — I10 ESSENTIAL HYPERTENSION: ICD-10-CM

## 2024-10-13 DIAGNOSIS — I48.0 PAROXYSMAL ATRIAL FIBRILLATION: ICD-10-CM

## 2024-10-15 RX ORDER — METOPROLOL TARTRATE 50 MG
TABLET ORAL
Qty: 180 TABLET | Refills: 0 | Status: SHIPPED | OUTPATIENT
Start: 2024-10-15

## 2024-10-16 RX ORDER — FUROSEMIDE 20 MG
TABLET ORAL
Qty: 90 TABLET | Refills: 0 | Status: SHIPPED | OUTPATIENT
Start: 2024-10-16

## 2024-11-04 NOTE — PROGRESS NOTES
Problem list     Subjective   Nanci Pritchard is a 81 y.o. female     Chief Complaint   Patient presents with   • Follow-up     w/ pacer check    Problem List:  1. Sick sinus syndrome.  1.1. Greater than 3 second pause noted per event monitor.  1.2. The patient was subsequently admitted to [facility] with placement of dual-chamber pacemaker per Dr. Gutierrez.  1.3. Apparent lead dislodgment with subsequent fixation of the leads the following day post pacemaker placement.  2. Preserved systolic function  3. Chest Discomfort  3.1 Low risk stress test, 09/2016.  3.2 Stress 7/2019 possible anterolateral wall ischemia  3.3 LHC 8/2019 stenting of LAD and RCA  3.4 Repeat [facility], 5/2020, with stenting of the LAD for subtotal IntraStent restenosis.  The patient had moderate but nonobstructive disease otherwise with medical management recommended.  4. Hypertension  5. Atrial Fibrillation  6. Fibromyalgia    HPI  The patient presents into the clinic today for routine follow-up.  She carries cardiovascular history as above.  The patient does report increasing chest discomfort as of recent.  She describes a precordial pressure which is very intense at times.  She reports increasing dyspnea which now limits virtually all activity.  Her level of fatigue is significant.  She does report associated palpitations at times, but really no sustained dysrhythmic activity.  Symptoms are now occurring at lower levels of activity and are very concerning for the patient.  She wants further evaluation at this time.  Otherwise, she has no failure or sustained dysrhythmic activity noted.    Current Outpatient Medications on File Prior to Visit   Medication Sig Dispense Refill   • amLODIPine (NORVASC) 5 MG tablet Take 1 tablet by mouth once daily 90 tablet 0   • Calcium Carbonate (CALTRATE 600) 1500 (600 Ca) MG tablet Take 600 mg by mouth Daily.     • clopidogrel (PLAVIX) 75 MG tablet Take 1 tablet by mouth once daily 90 tablet  Pt here for C4 D1 Drug(s): Taxol/Carboplatin.  Arrives Ambulating independently, accompanied by Family member     Patient was evaluated today by MD and Treatment Nurse.    Oral medications included in this regimen:  no    Patient confirms comprehension of cancer treatment schedule:  yes    Pregnancy screening:  Denies possibility of pregnancy    Modifications in dose or schedule:  No    Medications appearance and physical integrity checked by RN: yes.    Chemotherapy IV pump settings verified by 2 RNs:  Yes.  Frequency of blood return and site check throughout administration: Prior to administration, Prior to each drug, and At completion of therapy     Infusion/treatment outcome:  patient tolerated treatment without incident    Education Record    Learner:  Patient and Family Member  Barriers / Limitations:  None  Method:  Discussion  Education / instructions given:  Reviewed plan of care and follow up appts.  Outcome:  Shows understanding    Discharged Home, Ambulating independently, accompanied by:Family member    Patient/family verbalized understanding of future appointments: by [employer] messaging     0   • DULoxetine (CYMBALTA) 60 MG capsule Take 60 mg by mouth Daily.     • Evolocumab with Infusor (Repatha Pushtronex System) solution cartridge Inject 3.5 mL under the skin into the appropriate area as directed Every 30 (Thirty) Days. 1 cartridge. 11   • furosemide (LASIX) 20 MG tablet Take 1 tablet by mouth Daily As Needed (For edema). Take 1 tablet daily for 3 pound weight gain 60 tablet 3   • glimepiride (AMARYL) 4 MG tablet Take 4 mg by mouth Daily.     • lisinopril (PRINIVIL,ZESTRIL) 20 MG tablet Take 1 tablet by mouth twice daily 180 tablet 3   • metFORMIN (GLUCOPHAGE) 500 MG tablet Take 1,000 mg by mouth 2 (Two) Times a Day With Meals.     • metoprolol tartrate (LOPRESSOR) 50 MG tablet Take 1 tablet by mouth 2 (Two) Times a Day. 180 tablet 3   • Multiple Vitamins-Minerals (EYE VITAMINS) capsule Take  by mouth.     • nitroglycerin (NITROSTAT) 0.4 MG SL tablet 1 under the tongue as needed for angina, may repeat q5mins for up three doses 30 tablet 5   • Omega-3 Fatty Acids (OMEGA-3 FISH OIL PO) Take  by mouth.     • omeprazole (priLOSEC) 40 MG capsule Take 40 mg by mouth Daily.     • Potassium 99 MG tablet Take  by mouth.     • ranolazine (Ranexa) 500 MG 12 hr tablet Take 1 tablet by mouth 2 (Two) Times a Day. 60 tablet 11   • traMADol (ULTRAM) 50 MG tablet Take 50 mg by mouth Every 6 (Six) Hours As Needed for Moderate Pain .     • [DISCONTINUED] warfarin (COUMADIN) 5 MG tablet Take 1 tablet by mouth once daily 90 tablet 1   • [DISCONTINUED] warfarin (Coumadin) 7.5 MG tablet TAKE 7.5 MG ON WED'S AND 5 MG ALL OTHER DAYS 30 tablet 11   • [DISCONTINUED] cholecalciferol (VITAMIN D3) 25 MCG (1000 UT) tablet Take 1,000 Units by mouth Daily.     • [DISCONTINUED] vitamin B-12 (CYANOCOBALAMIN) 1000 MCG tablet Take 1,000 mcg by mouth Daily.     • [DISCONTINUED] vitamin D (ERGOCALCIFEROL) 1.25 MG (02160 UT) capsule capsule Take 1 capsule by mouth Daily.       No current facility-administered medications on file prior to  visit.       Soma [carisoprodol]    Past Medical History:   Diagnosis Date   • Arrhythmia    • Atrial fibrillation (CMS/[facility])    • Encounter for monitoring Coumadin therapy    • Fibromyalgia    • Fracture of face bones due to fall, closed, initial encounter (CMS/[facility])    • Histoplasmosis    • Hyperlipidemia    • Hypertension    • Osteoarthritis    • SSS (sick sinus syndrome) (CMS/[facility])        Social History     Socioeconomic History   • Marital status:      Spouse name: Not on file   • Number of children: Not on file   • Years of education: Not on file   • Highest education level: Not on file   Tobacco Use   • Smoking status: Never Smoker   • Smokeless tobacco: Never Used   Substance and Sexual Activity   • Alcohol use: No   • Drug use: No   • Sexual activity: Defer       Family History   Problem Relation Age of Onset   • Heart disease Mother    • Heart disease Father        Review of Systems   Constitutional: Negative.  Negative for chills, fatigue and fever.   HENT: Negative.  Negative for congestion, rhinorrhea and sore throat.    Eyes: Positive for visual disturbance (glasses).   Respiratory: Positive for chest tightness and shortness of breath. Negative for wheezing.    Cardiovascular: Positive for chest pain and palpitations. Negative for leg swelling.   Gastrointestinal: Negative.    Endocrine: Negative.    Genitourinary: Negative.    Musculoskeletal: Positive for arthralgias and back pain. Negative for neck pain.   Skin: Negative.  Negative for rash and wound.   Allergic/Immunologic: Negative.  Negative for environmental allergies.   Neurological: Negative for dizziness, weakness, numbness and headaches.   Hematological: Bruises/bleeds easily (bruises/ bleeds).   Psychiatric/Behavioral: Negative.  Negative for sleep disturbance.       Objective   Vitals:    08/06/21 1107   BP: 119/66   BP Location: Left arm   Patient Position: Sitting   Pulse: 63   SpO2: 97%   Weight: 66.7 kg (147 lb)   Height: 66 cm  "(25.98\")      /66 (BP Location: Left arm, Patient Position: Sitting)   Pulse 63   Ht 66 cm (25.98\")   Wt 66.7 kg (147 lb)   SpO2 97%   .17 kg/m²    Lab Results (most recent)     None        Physical Exam  Vitals and nursing note reviewed.   Constitutional:       General: She is not in acute distress.     Appearance: She is well-developed.   HENT:      Head: Normocephalic and atraumatic.   Eyes:      Conjunctiva/sclera: Conjunctivae normal.      Pupils: Pupils are equal, round, and reactive to light.   Neck:      Vascular: No JVD.      Trachea: No tracheal deviation.   Cardiovascular:      Rate and Rhythm: Normal rate and regular rhythm.      Heart sounds: Normal heart sounds.   Pulmonary:      Effort: Pulmonary effort is normal.      Breath sounds: Normal breath sounds.   Abdominal:      General: Bowel sounds are normal. There is no distension.      Palpations: Abdomen is soft. There is no mass.      Tenderness: There is no abdominal tenderness. There is no guarding or rebound.   Musculoskeletal:         General: No tenderness or deformity. Normal range of motion.      Cervical back: Normal range of motion and neck supple.   Skin:     General: Skin is warm and dry.      Coloration: Skin is not pale.      Findings: No erythema or rash.   Neurological:      Mental Status: She is alert and oriented to person, place, and time.   Psychiatric:         Behavior: Behavior normal.         Thought Content: Thought content normal.         Judgment: Judgment normal.           Procedure   Procedures       Assessment/Plan      Diagnosis Plan   1. Precordial pain  Stress Test With Myocardial Perfusion   2. Palpitations  Stress Test With Myocardial Perfusion   3. Shortness of breath  Stress Test With Myocardial Perfusion   4. Coronary artery disease due to calcified coronary lesion     5. Paroxysmal atrial fibrillation (CMS/HCC)         1.  With increasing chest discomfort, dyspnea, and symptoms otherwise as " outlined above, I do feel that we need to repeat a stress test for ischemia assessment.  We will schedule for Lexiscan nuclear stress test.  The patient cannot tolerate treadmill protocol.    2.  The patient requests to discontinue Coumadin, opting for Eliquis at this time.  We will discontinue the Coumadin.  She will hold that for 3 days and then start the Eliquis at 5 mg 1 p.o. twice daily.    3.  I would continue her medical regimen otherwise without change.  She appears to be doing fairly well at this time on that regimen.    4.  The patient did have a device interrogation today.  This supports normal function with adequate battery life, at least 10 years battery life remaining.  We will continue that every 6 months through the clinic.    5.  Further pending results of stress testing as above.  For any complications otherwise the patient will call to us.       Advance Care Planning   ACP discussion was held with the patient during this visit. Patient has an advance directive (not in EMR), copy requested.          Electronically signed by:

## 2024-11-14 ENCOUNTER — TELEPHONE (OUTPATIENT)
Dept: CARDIOLOGY | Facility: CLINIC | Age: 84
End: 2024-11-14
Payer: MEDICARE

## 2024-11-14 DIAGNOSIS — I10 ESSENTIAL HYPERTENSION: ICD-10-CM

## 2024-11-14 RX ORDER — LISINOPRIL 20 MG/1
20 TABLET ORAL 2 TIMES DAILY
Qty: 180 TABLET | Refills: 3 | Status: SHIPPED | OUTPATIENT
Start: 2024-11-14

## 2024-11-14 NOTE — TELEPHONE ENCOUNTER
Patient called for a refill of her lisinopril 20 mg tablets. Medication refilled, LVM for patient.    RELAY:    Lisinopril refilled

## 2024-11-14 NOTE — TELEPHONE ENCOUNTER
Advised okay to  tanning bed. She may want to monitor time to ensure pacemaker area doesn't get hot to touch with high intensity bulbs. Explained she may also burn or tan unevenly at site/scar especially if skin is stretched thin over pacemaker if protruding some.

## 2024-11-14 NOTE — TELEPHONE ENCOUNTER
Caller: Nanci Pritchard    Relationship: Self    Best call back number: [phone]    What is the best time to reach you: ANYTIME    What was the call regarding: PATIENT WOULD LIKE TO KNOW IF IT IS OKAY FOR HER TO USE A TANNING BED WITH HER PACEMAKER, SPECIFICALLY A STANDUP ONE.

## 2024-11-18 RX ORDER — CLOPIDOGREL BISULFATE 75 MG/1
TABLET ORAL
Qty: 90 TABLET | Refills: 0 | Status: SHIPPED | OUTPATIENT
Start: 2024-11-18

## 2024-12-17 RX ORDER — EVOLOCUMAB 420 MG/3.5
KIT SUBCUTANEOUS
Qty: 4 ML | Refills: 0 | Status: SHIPPED | OUTPATIENT
Start: 2024-12-17

## 2024-12-30 DIAGNOSIS — I10 ESSENTIAL HYPERTENSION: ICD-10-CM

## 2024-12-31 RX ORDER — AMLODIPINE BESYLATE 5 MG/1
TABLET ORAL
Qty: 90 TABLET | Refills: 0 | Status: SHIPPED | OUTPATIENT
Start: 2024-12-31

## 2025-01-07 NOTE — TELEPHONE ENCOUNTER
Patient needing refill of Repatha. Explained the cartridge is no longer available. New script sent for SureClick 140 mg. Advised prior authorization will be completed and she will be notified when ready to pickup.

## 2025-01-12 RX ORDER — FUROSEMIDE 20 MG/1
TABLET ORAL
Qty: 90 TABLET | Refills: 0 | Status: SHIPPED | OUTPATIENT
Start: 2025-01-12

## 2025-01-20 RX ORDER — RANOLAZINE 500 MG/1
500 TABLET, EXTENDED RELEASE ORAL 2 TIMES DAILY
Qty: 180 TABLET | Refills: 0 | Status: SHIPPED | OUTPATIENT
Start: 2025-01-20

## 2025-02-17 NOTE — TELEPHONE ENCOUNTER
PATIENT SEEN TODAY, BROUGHT IN RENEWAL PAPERWORK TO [employer] [facility] FOR REPATHA. FORMS COMPLETED ALONG WITH COPIES OF INCOME, SCRIPT, AND SIGNED BY NILESH PAINTING. ALL FAXED TO [employer] AT [phone]. PH,LPN  
Initial (On Arrival)

## 2025-02-24 RX ORDER — CLOPIDOGREL BISULFATE 75 MG/1
TABLET ORAL
Qty: 90 TABLET | Refills: 0 | Status: SHIPPED | OUTPATIENT
Start: 2025-02-24

## 2025-03-21 ENCOUNTER — OFFICE VISIT (OUTPATIENT)
Dept: CARDIOLOGY | Facility: CLINIC | Age: 85
End: 2025-03-21
Payer: MEDICARE

## 2025-03-21 DIAGNOSIS — I49.5 SICK SINUS SYNDROME: Primary | ICD-10-CM

## 2025-03-25 ENCOUNTER — OFFICE VISIT (OUTPATIENT)
Dept: CARDIOLOGY | Facility: CLINIC | Age: 85
End: 2025-03-25
Payer: MEDICARE

## 2025-03-25 VITALS
HEART RATE: 84 BPM | WEIGHT: 148 LBS | DIASTOLIC BLOOD PRESSURE: 73 MMHG | HEIGHT: 66 IN | OXYGEN SATURATION: 97 % | SYSTOLIC BLOOD PRESSURE: 148 MMHG | BODY MASS INDEX: 23.78 KG/M2

## 2025-03-25 DIAGNOSIS — R06.02 SHORTNESS OF BREATH: ICD-10-CM

## 2025-03-25 DIAGNOSIS — I25.10 CORONARY ARTERY DISEASE INVOLVING NATIVE CORONARY ARTERY OF NATIVE HEART WITHOUT ANGINA PECTORIS: ICD-10-CM

## 2025-03-25 DIAGNOSIS — R42 DIZZINESS: ICD-10-CM

## 2025-03-25 DIAGNOSIS — R00.2 PALPITATIONS: Primary | ICD-10-CM

## 2025-03-25 DIAGNOSIS — R09.89 BRUIT OF LEFT CAROTID ARTERY: ICD-10-CM

## 2025-03-25 PROCEDURE — 1159F MED LIST DOCD IN RCRD: CPT | Performed by: PHYSICIAN ASSISTANT

## 2025-03-25 PROCEDURE — 3077F SYST BP >= 140 MM HG: CPT | Performed by: PHYSICIAN ASSISTANT

## 2025-03-25 PROCEDURE — 3078F DIAST BP <80 MM HG: CPT | Performed by: PHYSICIAN ASSISTANT

## 2025-03-25 PROCEDURE — 99214 OFFICE O/P EST MOD 30 MIN: CPT | Performed by: PHYSICIAN ASSISTANT

## 2025-03-25 PROCEDURE — 1160F RVW MEDS BY RX/DR IN RCRD: CPT | Performed by: PHYSICIAN ASSISTANT

## 2025-03-25 NOTE — PROGRESS NOTES
Problem list     Subjective   Nanci Pritchard is a 84 y.o. female     Chief Complaint   Patient presents with    Follow-up     8 month follow up - patient reports has increase in headaches and pounding in her head.    Palpitations    Loss of Consciousness     Patient reports passed out at a singing approximately 1 month ago.   Problem List:  1. Sick sinus syndrome.  1.1. Greater than 3 second pause noted per event monitor.  1.2. The patient was subsequently admitted to [facility] with placement of dual-chamber pacemaker per Dr. Gutierrez.  1.3. Apparent lead dislodgment with subsequent fixation of the leads the following day post pacemaker placement.  2. Preserved systolic function  3. Chest Discomfort  3.1 Low risk stress test, 09/2016.  3.2 Stress 7/2019 possible anterolateral wall ischemia  3.3 LHC 8/2019 stenting of LAD and RCA  3.4 Repeat [facility], 5/2020, with stenting of the LAD for subtotal IntraStent restenosis.  The patient had moderate but nonobstructive disease otherwise with medical management recommended.  3.5.  Unstable angina, with admission and eventual catheterization, 9/2023.  Catheterization suggested severe diffuse disease involving the LAD (severe diffuse in-stent restenosis), severe diffuse disease of the circumflex, and diffuse disease throughout the RCA.  Bypass was recommended.  4. Hypertension  5. Atrial Fibrillation  6. Fibromyalgia    HPI  The patient presents in the clinic today for follow-up.  She just does not feel well at this time.  She really has no angina.  She previously had diffuse three-vessel coronary disease as above.  Bypass was recommended but she did not want to pursue that and continues not to want to pursue that.  She does have a history of A-fib and has avoided anticoagulation therapy.  Again reporting she wants no consideration for that.  CVA risk has been discussed in detail with her related to the same in the past.  Currently, the patient reports  increasing palpitations.  She describes irregularities in heart rhythm and tachycardia.  She has had increasing dizziness.  She notes worsened dyspnea as well.  She has no recent syncope, but apparently had borderline loss of consciousness several weeks ago while at a local [employer] singing.  She presents today for routine follow-up but is interested now in pursuing further evaluation.    Current Outpatient Medications on File Prior to Visit   Medication Sig Dispense Refill    amLODIPine (NORVASC) 5 MG tablet Take 1 tablet by mouth once daily 90 tablet 0    aspirin 81 MG EC tablet Take 1 tablet by mouth Daily.      clopidogrel (PLAVIX) 75 MG tablet Take 1 tablet by mouth once daily 90 tablet 0    Dapagliflozin Propanediol (Farxiga) 10 MG tablet Take 5 mg by mouth Daily.      DICLOFENAC PO Take  by mouth. 75 mg bid      DULoxetine (CYMBALTA) 60 MG capsule Take 1 capsule by mouth Daily.      Evolocumab (REPATHA) solution auto-injector SureClick injection Inject 1 mL under the skin into the appropriate area as directed Every 14 (Fourteen) Days. 2 mL 11    FeroSul 325 (65 Fe) MG tablet Take 1 tablet by mouth Daily.      glimepiride (AMARYL) 4 MG tablet Take 1 tablet by mouth Every Morning Before Breakfast.      lisinopril (PRINIVIL,ZESTRIL) 20 MG tablet Take 1 tablet by mouth 2 (Two) Times a Day. 180 tablet 3    magnesium oxide (MAG-OX) 400 MG tablet Take 1 tablet by mouth 2 (Two) Times a Day.      metFORMIN (GLUCOPHAGE) 500 MG tablet Take 2 tablets by mouth 2 (Two) Times a Day With Meals.      metoprolol tartrate (LOPRESSOR) 50 MG tablet Take 1 tablet by mouth twice daily 180 tablet 0    Multiple Vitamins-Minerals (EYE VITAMINS) capsule Take  by mouth.      nitroglycerin (NITROSTAT) 0.4 MG SL tablet 1 under the tongue as needed for angina, may repeat q5mins for up three doses 30 tablet 5    Omega-3 Fatty Acids (OMEGA-3 FISH OIL PO) Take  by mouth.      omeprazole (priLOSEC) 40 MG capsule Take 1 capsule by mouth Daily.       ranolazine (RANEXA) 500 MG 12 hr tablet Take 1 tablet by mouth twice daily 180 tablet 0    [DISCONTINUED] furosemide (LASIX) 20 MG tablet TAKE 1 TABLET BY MOUTH ONCE DAILY AS NEEDED FOR WEIGHT GAIN GREATER THAN 3 POUNDS 90 tablet 0    [DISCONTINUED] gabapentin (NEURONTIN) 300 MG capsule Take 1 capsule by mouth Daily.      [DISCONTINUED] levothyroxine (SYNTHROID, LEVOTHROID) 25 MCG tablet Take 1 tablet by mouth Every Morning.       No current facility-administered medications on file prior to visit.       Praluent [alirocumab] and Soma [carisoprodol]    Past Medical History:   Diagnosis Date    Arrhythmia     Atrial fibrillation     Encounter for monitoring Coumadin therapy     Fibromyalgia     Fracture of face bones due to fall, closed, initial encounter     Histoplasmosis     Hyperlipidemia     Hypertension     Osteoarthritis     SSS (sick sinus syndrome)        Social History     Socioeconomic History    Marital status:    Tobacco Use    Smoking status: Never    Smokeless tobacco: Never   Substance and Sexual Activity    Alcohol use: No    Drug use: No    Sexual activity: Defer       Family History   Problem Relation Age of Onset    Heart disease Mother     Heart disease Father        Review of Systems   Constitutional:  Positive for fatigue. Negative for chills, diaphoresis and fever.   HENT:  Negative for hearing loss.    Eyes: Negative.  Negative for visual disturbance.   Respiratory: Negative.  Negative for apnea, cough, chest tightness, shortness of breath and wheezing.    Cardiovascular:  Positive for palpitations and leg swelling (ankles). Negative for chest pain.   Gastrointestinal: Negative.  Negative for abdominal pain and blood in stool.   Endocrine: Negative.    Genitourinary: Negative.  Negative for hematuria.   Musculoskeletal:  Positive for arthralgias. Negative for back pain, myalgias, neck pain and neck stiffness.   Skin: Negative.  Negative for rash and wound.   Allergic/Immunologic:  "Negative.  Negative for environmental allergies and food allergies.   Neurological:  Positive for dizziness, syncope and headaches. Negative for weakness, light-headedness and numbness.   Hematological:  Bruises/bleeds easily (on plavix).   Psychiatric/Behavioral: Negative.  Negative for sleep disturbance.        Objective   Vitals:    25 1001   BP: 148/73   Pulse: 84   SpO2: 97%   Weight: 67.1 kg (148 lb)   Height: 167.6 cm (66\")      /73   Pulse 84   Ht 167.6 cm (66\")   Wt 67.1 kg (148 lb)   SpO2 97%   BMI 23.89 kg/m²    Lab Results (most recent)       None          Physical Exam  Vitals and nursing note reviewed.   Constitutional:       General: She is not in acute distress.     Appearance: She is well-developed.   HENT:      Head: Normocephalic and atraumatic.   Eyes:      Conjunctiva/sclera: Conjunctivae normal.      Pupils: Pupils are equal, round, and reactive to light.   Neck:      Vascular: No JVD.      Trachea: No tracheal deviation.      Comments: Loud left carotid bruit, minimal right carotid bruit.  Cardiovascular:      Rate and Rhythm: Normal rate and regular rhythm.      Heart sounds: Normal heart sounds.      Comments: 1/6 systolic murmur second prior costal space and left sternal border otherwise.  Pulmonary:      Effort: Pulmonary effort is normal.      Breath sounds: Normal breath sounds.   Abdominal:      General: Bowel sounds are normal. There is no distension.      Palpations: Abdomen is soft. There is no mass.      Tenderness: There is no abdominal tenderness. There is no guarding or rebound.   Musculoskeletal:         General: No tenderness or deformity. Normal range of motion.      Cervical back: Normal range of motion and neck supple.      Right lower le+      Left lower le+   Skin:     General: Skin is warm and dry.      Coloration: Skin is not pale.      Findings: No erythema or rash.   Neurological:      Mental Status: She is alert and oriented to person, place, " and time.   Psychiatric:         Behavior: Behavior normal.         Thought Content: Thought content normal.         Judgment: Judgment normal.           Procedure   Procedures       Assessment & Plan      Diagnosis Plan   1. Palpitations  Adult Transthoracic Echo Complete W/ Cont if Necessary Per Protocol    Holter Monitor - 72 Hour Up To 15 Days    Duplex Carotid Ultrasound CAR      2. Shortness of breath  Adult Transthoracic Echo Complete W/ Cont if Necessary Per Protocol    Holter Monitor - 72 Hour Up To 15 Days    Duplex Carotid Ultrasound CAR      3. Dizziness  Adult Transthoracic Echo Complete W/ Cont if Necessary Per Protocol    Holter Monitor - 72 Hour Up To 15 Days    Duplex Carotid Ultrasound CAR      4. Bruit of left carotid artery  Duplex Carotid Ultrasound CAR      5. Coronary artery disease involving native coronary artery of native heart without angina pectoris          1.  The patient presents in clinic today for follow-up.  This very pleasant patient has numerous issues as of recent, as above.  With increasing palpitations and symptoms otherwise as above, we will schedule for 72-hour Holter monitor.  We can evaluate for rate or rhythm disturbance issues contributing to clinical scenario.    2.  Carotid duplex will be performed.  She has a mild left carotid bruit and a fairly soft right carotid bruit.    3.  With clinical scenario, we will schedule for echocardiogram.  We can evaluate cardiac structure.    4.  For now, we will continue medical therapy without change.    5.  I have asked that she follow-up with her primary care provider for routine laboratories.    6.  She has multiple questions today about recurrent headaches.  She is scheduled to see her primary care provider next week and I will defer to that service.  She will return for any specific cardiac issues.  Further pending all the above.           Nanci Pritchard  reports that she has never smoked. She has never used smokeless tobacco.        Electronically signed by:

## 2025-03-27 ENCOUNTER — TELEPHONE (OUTPATIENT)
Dept: CARDIOLOGY | Facility: CLINIC | Age: 85
End: 2025-03-27
Payer: MEDICARE

## 2025-03-27 NOTE — TELEPHONE ENCOUNTER
Caller: Nanci Pritchard    Relationship: Self    Best call back number: [phone]    What is the best time to reach you: CAN LVM    What was the call regarding: ASKING EXACTLY HOW MANY DAYS DOES SHE WEAR THE MONITOR UNTIL REMOVING AND SENDING IN. PLEASE ADVISE THE PATIENT.     Is it okay if the provider responds through MyChart: NO

## 2025-03-31 ENCOUNTER — TELEPHONE (OUTPATIENT)
Dept: CARDIOLOGY | Facility: CLINIC | Age: 85
End: 2025-03-31
Payer: MEDICARE

## 2025-03-31 DIAGNOSIS — I10 ESSENTIAL HYPERTENSION: ICD-10-CM

## 2025-03-31 NOTE — TELEPHONE ENCOUNTER
Caller: Nanci Pritchard    Relationship: Self    Best call back number: [phone]    What is the best time to reach you: ANY    What was the call regarding: PATIENT ADVISING HER GIANNIA SURE CLICK IS NOT BEING PAID FOR AND SHE STATED SHELLI ALWAYS GETS THAT STRAIGHTENED OUT FOR HER. PLEASE CALL THE PATIENT TO DISCUSS/ADVISE.     Is it okay if the provider responds through MyChart: NO

## 2025-03-31 NOTE — TELEPHONE ENCOUNTER
Authorization not received from pharmacy but created through Cover My Meds. Status pending.    Approved today by [facility] 2017  PA Case: [id number], Status: Approved, Coverage Starts on: 1/1/2025 12:00:00 AM, Coverage Ends on: 12/31/2025 12:00:00 AM. Questions? Contact [phone].  Effective Date: 1/1/2025  Authorization Expiration Date: 12/31/2025

## 2025-04-01 RX ORDER — AMLODIPINE BESYLATE 5 MG/1
5 TABLET ORAL DAILY
Qty: 90 TABLET | Refills: 0 | Status: SHIPPED | OUTPATIENT
Start: 2025-04-01

## 2025-04-07 RX ORDER — FUROSEMIDE 20 MG/1
TABLET ORAL
Qty: 90 TABLET | Refills: 0 | OUTPATIENT
Start: 2025-04-07

## 2025-04-08 ENCOUNTER — HOSPITAL ENCOUNTER (OUTPATIENT)
Dept: CARDIOLOGY | Facility: HOSPITAL | Age: 85
Discharge: HOME | End: 2025-04-08
Payer: MEDICARE

## 2025-04-08 VITALS — BODY MASS INDEX: 23.77 KG/M2 | HEIGHT: 66 IN | WEIGHT: 147.93 LBS

## 2025-04-08 DIAGNOSIS — R42 DIZZINESS: ICD-10-CM

## 2025-04-08 DIAGNOSIS — R00.2 PALPITATIONS: ICD-10-CM

## 2025-04-08 DIAGNOSIS — R06.02 SHORTNESS OF BREATH: ICD-10-CM

## 2025-04-08 DIAGNOSIS — R09.89 BRUIT OF LEFT CAROTID ARTERY: ICD-10-CM

## 2025-04-08 LAB
AORTIC DIMENSIONLESS INDEX: 0.56 (DI)
AV MEAN PRESS GRAD SYS DOP V1V2: 6.4 MMHG
AV VMAX SYS DOP: 176.8 CM/SEC
BH CV ECHO MEAS - ACS: 1.17 CM
BH CV ECHO MEAS - AO MAX PG: 12.5 MMHG
BH CV ECHO MEAS - AO ROOT DIAM: 3.1 CM
BH CV ECHO MEAS - AO V2 VTI: 42 CM
BH CV ECHO MEAS - AVA(I,D): 1.7 CM2
BH CV ECHO MEAS - EDV(CUBED): 47.5 ML
BH CV ECHO MEAS - ESV(CUBED): 15.3 ML
BH CV ECHO MEAS - FS: 31.5 %
BH CV ECHO MEAS - IVS/LVPW: 1.02 CM
BH CV ECHO MEAS - IVSD: 1.08 CM
BH CV ECHO MEAS - LA DIMENSION: 3.8 CM
BH CV ECHO MEAS - LAT PEAK E' VEL: 6.5 CM/SEC
BH CV ECHO MEAS - LV MASS(C)D: 119.6 GRAMS
BH CV ECHO MEAS - LV MAX PG: 3.8 MMHG
BH CV ECHO MEAS - LV MEAN PG: 1.64 MMHG
BH CV ECHO MEAS - LV V1 MAX: 97.6 CM/SEC
BH CV ECHO MEAS - LV V1 VTI: 23.4 CM
BH CV ECHO MEAS - LVIDD: 3.6 CM
BH CV ECHO MEAS - LVIDS: 2.48 CM
BH CV ECHO MEAS - LVOT AREA: 3.1 CM2
BH CV ECHO MEAS - LVOT DIAM: 1.97 CM
BH CV ECHO MEAS - LVPWD: 1.06 CM
BH CV ECHO MEAS - MED PEAK E' VEL: 4.7 CM/SEC
BH CV ECHO MEAS - MV A MAX VEL: 105.5 CM/SEC
BH CV ECHO MEAS - MV DEC SLOPE: 507.3 CM/SEC2
BH CV ECHO MEAS - MV DEC TIME: 0.31 SEC
BH CV ECHO MEAS - MV E MAX VEL: 78.7 CM/SEC
BH CV ECHO MEAS - MV E/A: 0.75
BH CV ECHO MEAS - MV MAX PG: 7 MMHG
BH CV ECHO MEAS - MV MEAN PG: 2.01 MMHG
BH CV ECHO MEAS - MV P1/2T: 68 MSEC
BH CV ECHO MEAS - MV V2 VTI: 38.1 CM
BH CV ECHO MEAS - MVA(P1/2T): 3.2 CM2
BH CV ECHO MEAS - MVA(VTI): 1.88 CM2
BH CV ECHO MEAS - PA V2 MAX: 96.6 CM/SEC
BH CV ECHO MEAS - RAP SYSTOLE: 8 MMHG
BH CV ECHO MEAS - RV MAX PG: 2.49 MMHG
BH CV ECHO MEAS - RV V1 MAX: 79 CM/SEC
BH CV ECHO MEAS - RV V1 VTI: 22.1 CM
BH CV ECHO MEAS - RVDD: 3 CM
BH CV ECHO MEAS - RVSP: 32.1 MMHG
BH CV ECHO MEAS - SV(LVOT): 71.5 ML
BH CV ECHO MEAS - SVI(LVOT): 45.8 ML/M2
BH CV ECHO MEAS - TAPSE (>1.6): 2.09 CM
BH CV ECHO MEAS - TR MAX PG: 24.1 MMHG
BH CV ECHO MEAS - TR MAX VEL: 245.4 CM/SEC
BH CV ECHO MEASUREMENTS AVERAGE E/E' RATIO: 14.05
BH CV XLRA - TDI S': 11.4 CM/SEC
BH CV XLRA MEAS LEFT CAROTID BULB EDV: -25.9 CM/SEC
BH CV XLRA MEAS LEFT CAROTID BULB PSV: -74.3 CM/SEC
BH CV XLRA MEAS LEFT DIST CCA EDV: 14.9 CM/SEC
BH CV XLRA MEAS LEFT DIST CCA PSV: 47.8 CM/SEC
BH CV XLRA MEAS LEFT DIST ICA EDV: -22.6 CM/SEC
BH CV XLRA MEAS LEFT DIST ICA PSV: -66.3 CM/SEC
BH CV XLRA MEAS LEFT ICA/CCA RATIO: 8.9
BH CV XLRA MEAS LEFT MID ICA EDV: -20.9 CM/SEC
BH CV XLRA MEAS LEFT MID ICA PSV: -77.3 CM/SEC
BH CV XLRA MEAS LEFT PROX CCA EDV: 19.2 CM/SEC
BH CV XLRA MEAS LEFT PROX CCA PSV: 63.5 CM/SEC
BH CV XLRA MEAS LEFT PROX ECA PSV: -272 CM/SEC
BH CV XLRA MEAS LEFT PROX ICA EDV: -35.9 CM/SEC
BH CV XLRA MEAS LEFT PROX ICA PSV: -425 CM/SEC
BH CV XLRA MEAS LEFT VERTEBRAL A EDV: -18 CM/SEC
BH CV XLRA MEAS LEFT VERTEBRAL A PSV: -89.4 CM/SEC
BH CV XLRA MEAS RIGHT CAROTID BULB EDV: -13.2 CM/SEC
BH CV XLRA MEAS RIGHT CAROTID BULB PSV: -58.2 CM/SEC
BH CV XLRA MEAS RIGHT DIST CCA EDV: -9.1 CM/SEC
BH CV XLRA MEAS RIGHT DIST CCA PSV: -52.2 CM/SEC
BH CV XLRA MEAS RIGHT DIST ICA EDV: -20.7 CM/SEC
BH CV XLRA MEAS RIGHT DIST ICA PSV: -92.6 CM/SEC
BH CV XLRA MEAS RIGHT ICA/CCA RATIO: 2.5
BH CV XLRA MEAS RIGHT MID ICA EDV: -32.9 CM/SEC
BH CV XLRA MEAS RIGHT MID ICA PSV: -131 CM/SEC
BH CV XLRA MEAS RIGHT PROX CCA EDV: 13 CM/SEC
BH CV XLRA MEAS RIGHT PROX CCA PSV: 66.5 CM/SEC
BH CV XLRA MEAS RIGHT PROX ECA PSV: -120 CM/SEC
BH CV XLRA MEAS RIGHT PROX ICA EDV: -22.4 CM/SEC
BH CV XLRA MEAS RIGHT PROX ICA PSV: -98.2 CM/SEC
BH CV XLRA MEAS RIGHT VERTEBRAL A EDV: -12.4 CM/SEC
BH CV XLRA MEAS RIGHT VERTEBRAL A PSV: -61.5 CM/SEC
LV EF 2D ECHO EST: 60 %
SINUS: 3.3 CM

## 2025-04-08 PROCEDURE — 93880 EXTRACRANIAL BILAT STUDY: CPT

## 2025-04-08 PROCEDURE — 93306 TTE W/DOPPLER COMPLETE: CPT

## 2025-04-16 RX ORDER — RANOLAZINE 500 MG/1
500 TABLET, EXTENDED RELEASE ORAL 2 TIMES DAILY
Qty: 180 TABLET | Refills: 3 | Status: SHIPPED | OUTPATIENT
Start: 2025-04-16

## 2025-04-21 DIAGNOSIS — R00.2 PALPITATIONS: ICD-10-CM

## 2025-04-21 DIAGNOSIS — I48.0 PAROXYSMAL ATRIAL FIBRILLATION: ICD-10-CM

## 2025-04-21 DIAGNOSIS — I10 ESSENTIAL HYPERTENSION: ICD-10-CM

## 2025-04-21 RX ORDER — METOPROLOL TARTRATE 50 MG
50 TABLET ORAL 2 TIMES DAILY
Qty: 180 TABLET | Refills: 3 | Status: SHIPPED | OUTPATIENT
Start: 2025-04-21

## 2025-04-22 ENCOUNTER — TELEPHONE (OUTPATIENT)
Dept: CARDIOLOGY | Facility: CLINIC | Age: 85
End: 2025-04-22
Payer: MEDICARE

## 2025-04-22 ENCOUNTER — TELEPHONE (OUTPATIENT)
Dept: CARDIOLOGY | Facility: CLINIC | Age: 85
End: 2025-04-22

## 2025-04-22 ENCOUNTER — RESULTS FOLLOW-UP (OUTPATIENT)
Dept: CARDIOLOGY | Facility: CLINIC | Age: 85
End: 2025-04-22
Payer: MEDICARE

## 2025-04-22 DIAGNOSIS — I65.23 BILATERAL CAROTID ARTERY STENOSIS: Primary | ICD-10-CM

## 2025-04-22 NOTE — TELEPHONE ENCOUNTER
Caller: Nanci Pritchard    Relationship: Self    Best call back number: [phone]     What was the call regarding: PT RETURNING CALL, TRANSFERRED TO OFFICE

## 2025-04-22 NOTE — TELEPHONE ENCOUNTER
Patient notified of result's and recommendation's of carotid CTA patient denies allergy to iv contrast, she reports no longer taking metformin was discontinued and started on ozempic.     Orders entered.

## 2025-04-22 NOTE — TELEPHONE ENCOUNTER
4/22 LEFT MESSAGE FOR A CALL BACK PATIENT IS SCHEDULED FOR CTA CAROTID AT [facility] 4/29/25 AT 2:30. PATIENT HAS TO BE FASTING 4 HOURS PRIOR TO TEST.

## 2025-04-22 NOTE — TELEPHONE ENCOUNTER
04/17/2025 - Results: Ross Lopez MD and others  (Newest Message First)  Edilson Souza PA to Me (Selected Message)        4/21/25  8:52 AM  Carotid CTA.  Duplex Carotid Ultrasound CAR  Order: [id number]   Status: Final result       Dx: Palpitations; Shortness of breath; Di...    Test Result Released: No    Details    Reading Physician Reading Date Result Priority   Ross Lopez MD  [phone]  4/17/2025 Routine     Result Text       Antegrade right vertebral flow.    Antegrade left vertebral flow.     1.  Both common carotid arteries are without obstruction.  There is diffuse disease on the left.     2.  Mild bifurcation disease bilaterally with less than 16% stenosis by Doppler and less than 25% luminal encroachment by visual assessment.  There is moderate bifurcation disease on the left with diffuse predominantly concentric atheroma resulting in less than 50% by visual assessment.     3.  16 to 49% stenosis by Doppler in the mid right internal carotid artery with mild proximal disease.  There appears to be high grade disease with densely fibrotic plaque in the proximal left internal carotid artery contiguous with bifurcation disease described above.  There is 80 to 90% stenosis by Doppler at that level.     4.  Antegrade flow in both vertebral arteries.     Summary: Probable hemodynamically severe stenosis in the proximal left internal carotid artery.  Recommend  carotid CTA on an expedited basis for further assessment.

## 2025-04-28 ENCOUNTER — TELEPHONE (OUTPATIENT)
Dept: CARDIOLOGY | Facility: CLINIC | Age: 85
End: 2025-04-28

## 2025-04-28 NOTE — TELEPHONE ENCOUNTER
Caller: Nanci Pritchard    Relationship: Self    Best call back number: [phone] (home)     What is the best time to reach you: ANYTIME    Who are you requesting to speak with (clinical staff, provider,  specific staff member): CLINICAL    What was the call regarding: PT WANTED TO CANCEL CT ANGIOGRAM BECAUSE SHE FELT THAT THERE WOULD BE NO BENEFIT IN DOING SO.PLEASE CALL PT WITH ANY QUESTIONS. HUB GAVE PT [facility] PHONE NUMBER TO CANCEL.

## 2025-05-02 ENCOUNTER — TELEPHONE (OUTPATIENT)
Dept: CARDIOLOGY | Facility: CLINIC | Age: 85
End: 2025-05-02
Payer: MEDICARE

## 2025-05-02 NOTE — TELEPHONE ENCOUNTER
Caller: Nanci Pritchard    Relationship: Self    Best call back number: [phone]/ LEAVE MESSAGE    What is the best time to reach you: ANY    Who are you requesting to speak with (clinical staff, provider,  specific staff member): CLINICAL    Do you know the name of the person who called: NOT SURE    What was the call regarding: PT IS RETURNING TWO MISSED CALLS FROM YESTERDAY. PLEASE REACH BACK OUT..    Is it okay if the provider responds through MyChart: CALL

## 2025-05-06 NOTE — TELEPHONE ENCOUNTER
ECHO  Pt notified of no acute findings. Provider will discuss results at f/u. Pt reminded of appt date and time.  ----- Message from Estefania EDWARDS sent at 5/5/2025  3:22 PM EDT -----    ----- Message -----  From: Edilson Souza PA  Sent: 5/4/2025  10:12 PM EDT  To: Estefania Pierson MA    Routine follow-up.  ----- Message -----  From: Ross Lopez MD  Sent: 4/17/2025   7:19 PM EDT  To: RAYA Louie

## 2025-05-12 ENCOUNTER — TELEPHONE (OUTPATIENT)
Dept: CARDIOLOGY | Facility: CLINIC | Age: 85
End: 2025-05-12
Payer: MEDICARE

## 2025-05-12 NOTE — TELEPHONE ENCOUNTER
Caller: MONIQUE ZUNIGA    Relationship: Caregiver (non-relative)    Best call back number: [phone]    What is the best time to reach you: LEAVING HER PLACE AT 12:25 OR  CAN CALL TOMORROW    Who are you requesting to speak with (clinical staff, provider,  specific staff member): CLINICAL    Do you know the name of the person who called: N/A    What was the call regarding: PATIENTS CARE GIVER, MONIQUE NADIA IS CALLING TO SPEAK TO RAYA PAINTING ABOUT PATIENT PLAVIX.  PATIENT NEEDS A CALL TODAY IF POSSIBLE TO KNOW WHAT TO DO ABOUT HER PLAVIX.    PATIENT WAS IN HOSPITAL ON FRIDAY 5-9-25 SHE HAD BLEEDING ON THE BRAIN THAT HAS  NOT STOPPED AND THE HOSPITAL WOULD NOT TAKE HER OFF BLOOD THINNER.. BUT PATIENT TOOK HERSELF OFF FOR 2 DAYS. SHE NEEDS TO KNOW IF OR HOW LONG SHE CAN STAY OFF THE MEDICATION    Is it okay if the provider responds through MyChart: CALL

## 2025-05-12 NOTE — TELEPHONE ENCOUNTER
Edilson Souza PA to Me  (Selected Message)    5/12/25  9:28 AM  She can hold Plavix for 5 to 7 days.  We can reevaluate at that time.  Continue aspirin.

## 2025-05-14 ENCOUNTER — TELEPHONE (OUTPATIENT)
Dept: CARDIOLOGY | Facility: CLINIC | Age: 85
End: 2025-05-14
Payer: MEDICARE

## 2025-05-14 NOTE — TELEPHONE ENCOUNTER
Per note on 5/12/25, patient to hold 5-7 days then reevaluate a that time. Patient to call back on Monday for further recommendations.

## 2025-05-14 NOTE — TELEPHONE ENCOUNTER
Caller: DAVID ZUNIGA (WOR[employer])    Relationship: Other    Best call back number: [phone]     Which medication are you concerned about: clopidogrel (PLAVIX) 75 MG tablet [14572] (Order [id number])    Who prescribed you this medication: Edilson Souza, PA     When did you start taking this medication: A LONG TIME     What are your concerns: PT FELL, HAS BEEN HAVING CT SCANS. THEY JUST GOT BACK FROM DR. Del Angel OFFICE , STATES PT WAS TOLD SHE COULD STOP TAKING THE PLAVIX FOR 7 DAYS, BUT IN OFFICE THEY WERE TOLD SHE WOULD HAVE ANOTHER SCAN IN 3 WEEKS. WONDERING IF SHE CAN STAY OFF OF THIS MED FOR THAT LONG. PLEASE ADVISE.     SCAN SHOWS SMALL BLEED HAS NOT GOTTEN ANY WORSE OR BETTER

## 2025-05-19 ENCOUNTER — TELEPHONE (OUTPATIENT)
Dept: CARDIOLOGY | Facility: CLINIC | Age: 85
End: 2025-05-19
Payer: MEDICARE

## 2025-05-19 NOTE — TELEPHONE ENCOUNTER
Caller: Verónica Pritchardt    Relationship: Self    Best call back number: [phone]    What is the best time to reach you: BEFORE NOON    Who are you requesting to speak with (clinical staff, provider,  specific staff member): CLINICAL    What was the call regarding: PATIENT WAS TOLD TO CALL BACK TODAY SINCE SHE'S BEEN OFF OF HER PLAVIX FOR 7 DAYS DUE TO A BRAIN BLEED FROM A RECENT FALL.  SHE IS NEEDING SOME DIRECTION ABOUT IF SHE SHOULD START TAKING IT AGAIN OR NOT. PLEASE CALL HER BACK AS SOON AS POSSIBLE TO ADVISE. THANK YOU    Is it okay if the provider responds through [employer]: PLEASE CALL

## 2025-05-21 NOTE — TELEPHONE ENCOUNTER
No significant dysrhythmic activity, or at least sustained dysrhythmic activity. Routine follow-up.     Patient notified of result's and recommendation's.

## 2025-05-22 RX ORDER — CLOPIDOGREL BISULFATE 75 MG/1
75 TABLET ORAL DAILY
Qty: 90 TABLET | Refills: 0 | Status: SHIPPED | OUTPATIENT
Start: 2025-05-22

## 2025-07-03 DIAGNOSIS — I10 ESSENTIAL HYPERTENSION: ICD-10-CM

## 2025-07-03 RX ORDER — AMLODIPINE BESYLATE 5 MG/1
5 TABLET ORAL DAILY
Qty: 90 TABLET | Refills: 0 | Status: SHIPPED | OUTPATIENT
Start: 2025-07-03

## 2025-08-15 ENCOUNTER — TELEPHONE (OUTPATIENT)
Dept: CARDIOLOGY | Facility: CLINIC | Age: 85
End: 2025-08-15
Payer: MEDICARE

## 2025-08-15 RX ORDER — CLOPIDOGREL BISULFATE 75 MG/1
75 TABLET ORAL DAILY
Qty: 90 TABLET | Refills: 3 | Status: SHIPPED | OUTPATIENT
Start: 2025-08-15

## 2025-08-21 ENCOUNTER — TELEPHONE (OUTPATIENT)
Dept: CARDIOLOGY | Facility: CLINIC | Age: 85
End: 2025-08-21
Payer: MEDICARE